# Patient Record
Sex: MALE | Race: WHITE | NOT HISPANIC OR LATINO | Employment: OTHER | URBAN - METROPOLITAN AREA
[De-identification: names, ages, dates, MRNs, and addresses within clinical notes are randomized per-mention and may not be internally consistent; named-entity substitution may affect disease eponyms.]

---

## 2017-01-03 ENCOUNTER — TELEPHONE (OUTPATIENT)
Dept: PODIATRY | Facility: CLINIC | Age: 82
End: 2017-01-03

## 2017-01-03 NOTE — TELEPHONE ENCOUNTER
Message sent to patient via patient portal regarding fungus culture results. I advised the patient that they have been reviewed by Dr. Duenas forwarded to [email or web address] for compound recommendation.

## 2017-01-03 NOTE — TELEPHONE ENCOUNTER
----- Message from Bud Daly sent at 1/3/2017  3:34 PM CST -----  Contact: david rahman [facility]   States she is rtn nurses call and can be reached at [phone] press option 4//thanks/dbw

## 2017-01-03 NOTE — TELEPHONE ENCOUNTER
----- Message from Rodney Goncalves sent at 1/3/2017  3:05 PM CST -----  Contact: celine hernandez/ [facility]  She's calling in regards to the culture for the pt, please advise, ph# [phone]

## 2017-01-03 NOTE — TELEPHONE ENCOUNTER
I returned the call to Gemma. She stated that she was calling to advise that she had received my fax from earlier requesting a compound recommendation for the patient's nail fungus and that the patient had already received the nail suspension that they would have recommended for him in early December. She then stated that she just wanted to make sure that the patient did not also need a compounded cream as well. I advised her that per Dr. Duenas's last note, the patient did not have a wound and was only being treated for the nail fungus. I then advised her that I would let Dr. Duenas know and give her a call back if the patient needed anything further. She verbalized understanding and stated that she would go ahead and call the patient and let him know. I advised her that that would be fine. Call ended well.

## 2017-01-03 NOTE — TELEPHONE ENCOUNTER
----- Message from Claribel Duenas DPM sent at 12/30/2016  1:56 PM CST -----  Call and tell pt that cultures results have been reviewed and will order compound. Please forward culture results to [facility] for compounding.

## 2017-01-03 NOTE — TELEPHONE ENCOUNTER
I returned the call to Gemma. She was unavailable, and so I left a message for her to return my call to [phone].

## 2017-01-06 ENCOUNTER — PATIENT MESSAGE (OUTPATIENT)
Dept: PODIATRY | Facility: CLINIC | Age: 82
End: 2017-01-06

## 2017-01-28 DIAGNOSIS — M19.90 OSTEOARTHRITIS, UNSPECIFIED OSTEOARTHRITIS TYPE, UNSPECIFIED SITE: ICD-10-CM

## 2017-01-30 RX ORDER — TRAMADOL HYDROCHLORIDE 50 MG/1
TABLET ORAL
Qty: 30 TABLET | Refills: 0 | Status: SHIPPED | OUTPATIENT
Start: 2017-01-30 | End: 2017-03-04 | Stop reason: SDUPTHER

## 2017-02-01 ENCOUNTER — TELEPHONE (OUTPATIENT)
Dept: INTERNAL MEDICINE | Facility: CLINIC | Age: 82
End: 2017-02-01

## 2017-02-01 NOTE — TELEPHONE ENCOUNTER
Returned pt daughter phone call, she states that she would like something called in for her dad. He has been not sleeping well , has mood changes and not eating. He has an apt on tomorrow for 8:40am to see you. She also wants you to know that her dad has fallen 2wks ago and injured himself. She would like to get assistance with home davonte for her dad.

## 2017-03-04 DIAGNOSIS — M19.90 OSTEOARTHRITIS, UNSPECIFIED OSTEOARTHRITIS TYPE, UNSPECIFIED SITE: ICD-10-CM

## 2017-03-06 RX ORDER — TRAMADOL HYDROCHLORIDE 50 MG/1
TABLET ORAL
Qty: 30 TABLET | Refills: 0 | Status: SHIPPED | OUTPATIENT
Start: 2017-03-06 | End: 2017-10-23

## 2017-04-12 ENCOUNTER — TELEPHONE (OUTPATIENT)
Dept: INTERNAL MEDICINE | Facility: CLINIC | Age: 82
End: 2017-04-12

## 2017-04-12 ENCOUNTER — OFFICE VISIT (OUTPATIENT)
Dept: URGENT CARE | Facility: CLINIC | Age: 82
End: 2017-04-12
Payer: MEDICARE

## 2017-04-12 VITALS
HEIGHT: 64 IN | WEIGHT: 138 LBS | OXYGEN SATURATION: 94 % | TEMPERATURE: 98 F | DIASTOLIC BLOOD PRESSURE: 70 MMHG | HEART RATE: 59 BPM | SYSTOLIC BLOOD PRESSURE: 120 MMHG | BODY MASS INDEX: 23.56 KG/M2

## 2017-04-12 DIAGNOSIS — R09.A2 GLOBUS SENSATION: Primary | ICD-10-CM

## 2017-04-12 PROCEDURE — 99213 OFFICE O/P EST LOW 20 MIN: CPT | Mod: PBBFAC,PO | Performed by: NURSE PRACTITIONER

## 2017-04-12 PROCEDURE — 99999 PR PBB SHADOW E&M-EST. PATIENT-LVL III: CPT | Mod: PBBFAC,,, | Performed by: NURSE PRACTITIONER

## 2017-04-12 PROCEDURE — 99214 OFFICE O/P EST MOD 30 MIN: CPT | Mod: S$PBB,,, | Performed by: NURSE PRACTITIONER

## 2017-04-12 RX ORDER — PREDNISONE 10 MG/1
10 TABLET ORAL DAILY
Qty: 3 TABLET | Refills: 0 | Status: SHIPPED | OUTPATIENT
Start: 2017-04-12 | End: 2017-04-15

## 2017-04-12 NOTE — MR AVS SNAPSHOT
[facility]  [address] [place] [address]  Phone: [phone]  Fax: [phone]                  Coy Paredeskaylee   2017 5:40 PM   Office Visit    Description:  Male : 1924   Provider:  Adwoa Truong NP   Department:  [facility]           Reason for Visit     Sore Throat           Diagnoses this Visit        Comments    Globus sensation    -  Primary            To Do List           Future Appointments        Provider Department Dept Phone    2017 8:30 AM Roscoe Boyd MD [facility] [phone]      Goals (5 Years of Data)     None      Follow-Up and Disposition     Return if symptoms worsen or fail to improve.       These Medications        Disp Refills Start End    predniSONE (DELTASONE) 10 MG tablet 3 tablet 0 2017 4/15/2017    Take 1 tablet (10 mg total) by mouth once daily. - Oral    Pharmacy: [facility]/pharmacy #[id number] - [place], [place] - [address] [address] [facility] [address] Ph #: [phone]         [facility]     [facility] Nurse Care Line -  Assistance  Unless otherwise directed by your provider, please contact [facility], our nurse care line that is available for  assistance.     Registered nurses in the [facility] Center provide: appointment scheduling, clinical advisement, health education, and other advisory services.  Call: [phone] (toll free)               Medications           Message regarding Medications     Verify the changes and/or additions to your medication regime listed below are the same as discussed with your clinician today.  If any of these changes or additions are incorrect, please notify your healthcare provider.        START taking these NEW medications        Refills    predniSONE (DELTASONE) 10 MG tablet 0    Sig: Take 1 tablet (10 mg total) by mouth once daily.    Class: Normal    Route: Oral           Verify that the below list of medications is an  "accurate representation of the medications you are currently taking.  If none reported, the list may be blank. If incorrect, please contact your healthcare provider. Carry this list with you in case of emergency.           Current Medications     aspirin (ECOTRIN) 81 MG EC tablet Take 81 mg by mouth every other day.     atorvastatin (LIPITOR) 20 MG tablet Take 1 tablet (20 mg total) by mouth once daily.    benazepril-hydrochlorthiazide (LOTENSIN HCT) 20-12.5 mg per tablet Take 1 tablet by mouth once daily.    cyanocobalamin (VITAMIN B-12) 1000 MCG tablet Take 100 mcg by mouth once daily.    docusate sodium (COLACE) 100 MG capsule Take 1 capsule (100 mg total) by mouth 2 (two) times daily.    multivitamin capsule Take 1 capsule by mouth once daily.    predniSONE (DELTASONE) 10 MG tablet Take 1 tablet (10 mg total) by mouth once daily.    tramadol (ULTRAM) 50 mg tablet TAKE 1 TABLET BY MOUTH AT BEDTIME           Clinical Reference Information           Your Vitals Were     BP Pulse Temp Height    120/70 (BP Location: Right arm, Patient Position: Sitting, BP Method: Manual) 59 98.2 °F (36.8 °C) (Tympanic) 5' 4" (1.626 m)    Weight SpO2 BMI    62.6 kg (138 lb 0.1 oz) 94% 23.69 kg/m2      Blood Pressure          Most Recent Value    BP  120/70      Allergies as of 4/12/2017     Baycol [Cerivastatin]    Codeine    Norvasc [Amlodipine]    Sular [Nisoldipine]      Immunizations Administered on Date of Encounter - 4/12/2017     None      Language Assistance Services     ATTENTION: Language assistance services are available, free of charge. Please call [phone].      ATENCIÓN: Si habla español, tiene a gifford disposición servicios gratuitos de asistencia lingüística. Llame al [phone].     CHÚ Ý: N?u b?n nói Ti?ng Vi?t, có các d?ch v? h? tr? ngôn ng? mi?n phí dành cho b?n. G?i s? [phone].         [facility] complies with applicable Federal civil rights laws and does not discriminate on the basis of " race, color, national origin, age, disability, or sex.

## 2017-04-12 NOTE — TELEPHONE ENCOUNTER
Pt daughter called in stating  That her dad Couldn't sleep last night felt like he couldn't breath. Pt daughter states that he sat up and chewed ice and it helped a little bit.  She states that it comes and goes. she will take him to urgent care at [facility]      Please Advise

## 2017-04-12 NOTE — TELEPHONE ENCOUNTER
----- Message from Carla Harris sent at 4/12/2017  4:25 PM CDT -----  Contact: pt dght  Call pt gayle Josue at [phone]//regarding that he had difficult sleeping last nite he sit up feel like the top of throat felt like it was closing//jose ht

## 2017-04-13 NOTE — PROGRESS NOTES
Subjective:       Patient ID: Coy López is a 89 y.o. male.    Chief Complaint: Sore Throat (pt feels like his throat is closing up)    HPI Comments: 89 yo male presents to Urgent Care with reports of sensation of something in his throat last night. He states it resolved after chewing ice. Denies sensation in office today. Denies any new foods of medication. Denies any associated symptoms at present.    Sore Throat    This is a new problem. The current episode started yesterday. The problem has been resolved. Neither side of throat is experiencing more pain than the other. There has been no fever. The patient is experiencing no pain. Pertinent negatives include no abdominal pain, congestion, coughing, diarrhea, drooling, ear discharge, ear pain, headaches, hoarse voice, plugged ear sensation, neck pain, shortness of breath, swollen glands, trouble swallowing or vomiting. He has had no exposure to strep or mono. He has tried cool liquids for the symptoms. The treatment provided moderate relief.     Review of Systems   Constitutional: Negative for activity change and fatigue.   HENT: Positive for sore throat. Negative for congestion, drooling, ear discharge, ear pain, hoarse voice, rhinorrhea, sinus pressure and trouble swallowing.         Throat felt swollen last night   Eyes: Negative for pain, discharge and visual disturbance.   Respiratory: Negative for cough, choking, shortness of breath and wheezing.    Cardiovascular: Negative for chest pain, palpitations and leg swelling.   Gastrointestinal: Negative for abdominal pain, constipation, diarrhea, nausea and vomiting.   Endocrine: Negative for cold intolerance.   Genitourinary: Negative for difficulty urinating, dysuria, flank pain, frequency and genital sores.   Musculoskeletal: Negative for arthralgias, back pain, gait problem and neck pain.   Skin: Negative for rash and wound.   Allergic/Immunologic: Negative for environmental allergies and food  allergies.   Neurological: Negative for dizziness, light-headedness, numbness and headaches.   Hematological: Negative for adenopathy.   Psychiatric/Behavioral: Negative for behavioral problems.   All other systems reviewed and are negative.      Objective:      Physical Exam   Constitutional: He is oriented to person, place, and time. He appears well-developed and well-nourished.   HENT:   Head: Normocephalic.   Mouth/Throat: Uvula is midline, oropharynx is clear and moist and mucous membranes are normal. No tonsillar exudate.   Denture removed in office with no signs of abnormalities.   Cardiovascular: Normal rate and normal heart sounds.    Pulmonary/Chest: Effort normal and breath sounds normal.   Neurological: He is alert and oriented to person, place, and time.   Skin: Skin is warm and dry.   Psychiatric: He has a normal mood and affect.   Nursing note and vitals reviewed.      Assessment:       1. Globus sensation        Plan:         Coy was seen today for sore throat.    Diagnoses and all orders for this visit:    Globus sensation  -     predniSONE (DELTASONE) 10 MG tablet; Take 1 tablet (10 mg total) by mouth once daily.    Discussed with patient and daughter home care and treatment. Discussed mediations currently on may need evaluation from PCP. Advised if symptoms return tonight to report to ER. Patient and Daughter agreed. Patient discharged in stable condition with AVS in hand.

## 2017-04-20 ENCOUNTER — OFFICE VISIT (OUTPATIENT)
Dept: INTERNAL MEDICINE | Facility: CLINIC | Age: 82
End: 2017-04-20
Payer: MEDICARE

## 2017-04-20 VITALS
BODY MASS INDEX: 23.54 KG/M2 | TEMPERATURE: 97 F | DIASTOLIC BLOOD PRESSURE: 65 MMHG | HEIGHT: 64 IN | OXYGEN SATURATION: 98 % | SYSTOLIC BLOOD PRESSURE: 129 MMHG | HEART RATE: 62 BPM | WEIGHT: 137.88 LBS

## 2017-04-20 DIAGNOSIS — K59.09 CONSTIPATION, CHRONIC: ICD-10-CM

## 2017-04-20 DIAGNOSIS — N18.30 CHRONIC KIDNEY DISEASE, STAGE III (MODERATE): ICD-10-CM

## 2017-04-20 DIAGNOSIS — E78.5 HYPERLIPIDEMIA, UNSPECIFIED HYPERLIPIDEMIA TYPE: ICD-10-CM

## 2017-04-20 DIAGNOSIS — I10 ESSENTIAL HYPERTENSION: Primary | ICD-10-CM

## 2017-04-20 DIAGNOSIS — C61 PROSTATE CA: ICD-10-CM

## 2017-04-20 DIAGNOSIS — C67.9 MALIGNANT NEOPLASM OF URINARY BLADDER, UNSPECIFIED SITE: ICD-10-CM

## 2017-04-20 DIAGNOSIS — G45.9 TRANSIENT CEREBRAL ISCHEMIA, UNSPECIFIED TYPE: ICD-10-CM

## 2017-04-20 LAB
BASOPHILS # BLD AUTO: 0.03 K/UL
BASOPHILS NFR BLD: 0.5 %
BILIRUB UR QL STRIP: NEGATIVE
CLARITY UR REFRACT.AUTO: CLEAR
COLOR UR AUTO: YELLOW
DIFFERENTIAL METHOD: ABNORMAL
EOSINOPHIL # BLD AUTO: 0.3 K/UL
EOSINOPHIL NFR BLD: 4.1 %
ERYTHROCYTE [DISTWIDTH] IN BLOOD BY AUTOMATED COUNT: 12.8 %
GLUCOSE UR QL STRIP: NEGATIVE
HCT VFR BLD AUTO: 39.3 %
HGB BLD-MCNC: 13.2 G/DL
HGB UR QL STRIP: NEGATIVE
KETONES UR QL STRIP: NEGATIVE
LEUKOCYTE ESTERASE UR QL STRIP: NEGATIVE
LYMPHOCYTES # BLD AUTO: 1.8 K/UL
LYMPHOCYTES NFR BLD: 27.2 %
MCH RBC QN AUTO: 30.5 PG
MCHC RBC AUTO-ENTMCNC: 33.6 %
MCV RBC AUTO: 91 FL
MONOCYTES # BLD AUTO: 0.6 K/UL
MONOCYTES NFR BLD: 9.3 %
NEUTROPHILS # BLD AUTO: 3.8 K/UL
NEUTROPHILS NFR BLD: 58.7 %
NITRITE UR QL STRIP: NEGATIVE
PH UR STRIP: 6 [PH] (ref 5–8)
PLATELET # BLD AUTO: 244 K/UL
PMV BLD AUTO: 10.6 FL
PROT UR QL STRIP: NEGATIVE
RBC # BLD AUTO: 4.33 M/UL
SP GR UR STRIP: 1.02 (ref 1–1.03)
URN SPEC COLLECT METH UR: NORMAL
UROBILINOGEN UR STRIP-ACNC: NEGATIVE EU/DL
WBC # BLD AUTO: 6.54 K/UL

## 2017-04-20 PROCEDURE — 99213 OFFICE O/P EST LOW 20 MIN: CPT | Mod: PBBFAC,PO | Performed by: FAMILY MEDICINE

## 2017-04-20 PROCEDURE — 81003 URINALYSIS AUTO W/O SCOPE: CPT

## 2017-04-20 PROCEDURE — 80053 COMPREHEN METABOLIC PANEL: CPT

## 2017-04-20 PROCEDURE — 85025 COMPLETE CBC W/AUTO DIFF WBC: CPT

## 2017-04-20 PROCEDURE — 99214 OFFICE O/P EST MOD 30 MIN: CPT | Mod: S$PBB,,, | Performed by: FAMILY MEDICINE

## 2017-04-20 PROCEDURE — 99999 PR PBB SHADOW E&M-EST. PATIENT-LVL III: CPT | Mod: PBBFAC,,, | Performed by: FAMILY MEDICINE

## 2017-04-20 NOTE — MR AVS SNAPSHOT
[address]  [place] [address]  Phone: [phone]  Fax: [phone]                  Coy López   2017 8:30 AM   Office Visit    Description:  Male : 1924   Provider:  Roscoe Boyd MD   Department:  [facility]           Reason for Visit     Follow-up           Diagnoses this Visit        Comments    Transient cerebral ischemia, unspecified type    -  Primary     Essential hypertension         Malignant neoplasm of urinary bladder, unspecified site         Constipation, chronic                To Do List           Future Appointments        Provider Department Dept Phone    2017 8:30 AM Roscoe Boyd MD [facility] [phone]      Goals (5 Years of Data)     None      Follow-Up and Disposition     Return in about 4 months (around 2017).      [facility]     [facility] On Call Nurse Care Line -  Assistance  Unless otherwise directed by your provider, please contact [facility], our nurse care line that is available for  assistance.     Registered nurses in the [facility] On Call Center provide: appointment scheduling, clinical advisement, health education, and other advisory services.  Call: [phone] (toll free)               Medications           Message regarding Medications     Verify the changes and/or additions to your medication regime listed below are the same as discussed with your clinician today.  If any of these changes or additions are incorrect, please notify your healthcare provider.        STOP taking these medications     multivitamin capsule Take 1 capsule by mouth once daily.           Verify that the below list of medications is an accurate representation of the medications you are currently taking.  If none reported, the list may be blank. If incorrect, please contact your healthcare provider. Carry this list with you in case of emergency.           Current Medications      "aspirin (ECOTRIN) 81 MG EC tablet Take 81 mg by mouth every other day.     atorvastatin (LIPITOR) 20 MG tablet Take 1 tablet (20 mg total) by mouth once daily.    benazepril-hydrochlorthiazide (LOTENSIN HCT) 20-12.5 mg per tablet Take 1 tablet by mouth once daily.    cyanocobalamin (VITAMIN B-12) 1000 MCG tablet Take 100 mcg by mouth once daily.    docusate sodium (COLACE) 100 MG capsule Take 1 capsule (100 mg total) by mouth 2 (two) times daily.    tramadol (ULTRAM) 50 mg tablet TAKE 1 TABLET BY MOUTH AT BEDTIME           Clinical Reference Information           Your Vitals Were     BP Pulse Temp Height Weight SpO2    129/65 62 97.4 °F (36.3 °C) (Oral) 5' 4" (1.626 m) 62.5 kg (137 lb 14.4 oz) 98%    BMI                23.67 kg/m2          Blood Pressure          Most Recent Value    BP  129/65      Allergies as of 4/20/2017     Baycol [Cerivastatin]    Codeine    Norvasc [Amlodipine]    Sular [Nisoldipine]      Immunizations Administered on Date of Encounter - 4/20/2017     None      Orders Placed During Today's Visit      Normal Orders This Visit    CBC auto differential     Comprehensive metabolic panel     Urinalysis       Language Assistance Services     ATTENTION: Language assistance services are available, free of charge. Please call [phone].      ATENCIÓN: Si habla español, tiene a gifford disposición servicios gratuitos de asistencia lingüística. Llame al [phone].     REINIER Ý: N?u b?n nói Ti?ng Vi?t, có các d?ch v? h? tr? ngôn ng? mi?n phí dành cho b?n. G?i s? [phone].         [facility] complies with applicable Federal civil rights laws and does not discriminate on the basis of race, color, national origin, age, disability, or sex.        "

## 2017-04-20 NOTE — PROGRESS NOTES
Subjective:       Patient ID: Coy López is a 89 y.o. male.    Chief Complaint: Follow-up    HPI Comments: Follow-up hypertension, hyperlipidemia, chronic kidney disease stage III, TIA, chronic constipation.  He has a history of prostate cancer and bladder cancer times years ago.  He reports he no longer is being followed by urology.  He has no urinary tract symptoms.  He has no hematuria.  He denies any headaches, chest pain, palpitations, shortness of breath.  He is avoiding NSAIDs due to chronic kidney disease.  Chronic constipation is controlled with MiraLAX.    Review of Systems   Constitutional: Negative for appetite change, chills, fatigue, fever and unexpected weight change.   HENT: Negative for congestion and postnasal drip.         He had a recent episode where he felt like his throat was closing.  Urgent care evaluation was done.  He reports this has resolved.  Denies dysphagia esophageal reflux   Respiratory: Negative for cough, chest tightness, shortness of breath and wheezing.    Cardiovascular: Negative for chest pain, palpitations and leg swelling.        Denies lightheadedness or syncope   Gastrointestinal: Positive for constipation. Negative for abdominal distention, abdominal pain, blood in stool, diarrhea, nausea and vomiting.   Genitourinary: Negative for difficulty urinating, dysuria, frequency, hematuria and urgency.   Musculoskeletal: Positive for back pain.   Neurological: Negative for headaches.       Objective:      Physical Exam   Constitutional: He is oriented to person, place, and time. He appears well-developed and well-nourished.   HENT:   Right Ear: External ear normal.   Left Ear: External ear normal.   Nose: Nose normal.   Mouth/Throat: Oropharynx is clear and moist.   Eyes: Conjunctivae are normal.   Neck: Neck supple. No JVD present. No thyromegaly present.   Cardiovascular: Normal rate, regular rhythm and normal heart sounds.    No murmur heard.  Pulmonary/Chest:  Effort normal and breath sounds normal. No respiratory distress. He has no wheezes. He has no rales.   Abdominal: Soft. Bowel sounds are normal. He exhibits no mass. There is no tenderness.   Lymphadenopathy:     He has no cervical adenopathy.   Neurological: He is alert and oriented to person, place, and time. No cranial nerve deficit. He exhibits normal muscle tone. Coordination normal.       Office Visit on 12/01/2016   Component Date Value Ref Range Status    Fungus (Mycology) Culture 12/01/2016 TRICHOPHYTON RUBRUM   Final    KOH Prep 12/01/2016 Few Budding yeast   Final     Assessment:       1. Transient cerebral ischemia, unspecified type    2. Essential hypertension    3. Malignant neoplasm of urinary bladder, unspecified site    4. Constipation, chronic        Plan:   1. Essential hypertension  Blood pressure control 129/65 continue current medications  - CBC auto differential  - Comprehensive metabolic panel    2. Transient cerebral ischemia, unspecified type  He continues on low-dose aspirin.  He denies any current neurological deficits    3. Malignant neoplasm of urinary bladder, unspecified site    Asymptomatic  - Urinalysis    4. Constipation, chronic    Controller MiraLAX    5. Chronic kidney disease, stage III (moderate)     Continue avoiding NSAIDs and recheck lab    6. Prostate CA    No current symptoms  Follow-up in 4 months    7. Hyperlipidemia, unspecified hyperlipidemia type  Lipitor controlled with   medication        Transient cerebral ischemia, unspecified type    Essential hypertension  -     CBC auto differential  -     Comprehensive metabolic panel    Malignant neoplasm of urinary bladder, unspecified site  -     Urinalysis    Constipation, chronic

## 2017-04-21 LAB
ALBUMIN SERPL BCP-MCNC: 3.8 G/DL
ALP SERPL-CCNC: 64 U/L
ALT SERPL W/O P-5'-P-CCNC: 21 U/L
ANION GAP SERPL CALC-SCNC: 8 MMOL/L
AST SERPL-CCNC: 26 U/L
BILIRUB SERPL-MCNC: 0.7 MG/DL
BUN SERPL-MCNC: 24 MG/DL
CALCIUM SERPL-MCNC: 9.3 MG/DL
CHLORIDE SERPL-SCNC: 105 MMOL/L
CO2 SERPL-SCNC: 29 MMOL/L
CREAT SERPL-MCNC: 1.2 MG/DL
EST. GFR  (AFRICAN AMERICAN): >60 ML/MIN/1.73 M^2
EST. GFR  (NON AFRICAN AMERICAN): 52.2 ML/MIN/1.73 M^2
GLUCOSE SERPL-MCNC: 124 MG/DL
POTASSIUM SERPL-SCNC: 3.9 MMOL/L
PROT SERPL-MCNC: 7.3 G/DL
SODIUM SERPL-SCNC: 142 MMOL/L

## 2017-04-22 ENCOUNTER — PATIENT MESSAGE (OUTPATIENT)
Dept: INTERNAL MEDICINE | Facility: CLINIC | Age: 82
End: 2017-04-22

## 2017-05-01 RX ORDER — ATORVASTATIN CALCIUM 20 MG/1
TABLET, FILM COATED ORAL
Qty: 90 TABLET | Refills: 3 | Status: SHIPPED | OUTPATIENT
Start: 2017-05-01 | End: 2017-07-24

## 2017-05-22 ENCOUNTER — PATIENT MESSAGE (OUTPATIENT)
Dept: INTERNAL MEDICINE | Facility: CLINIC | Age: 82
End: 2017-05-22

## 2017-05-22 RX ORDER — BENAZEPRIL HYDROCHLORIDE AND HYDROCHLOROTHIAZIDE 20; 12.5 MG/1; MG/1
1 TABLET ORAL DAILY
Qty: 30 TABLET | Refills: 5 | Status: SHIPPED | OUTPATIENT
Start: 2017-05-22 | End: 2018-01-24 | Stop reason: SDUPTHER

## 2017-05-22 NOTE — TELEPHONE ENCOUNTER
----- Message from Alvarado Ricci sent at 5/22/2017 12:18 PM CDT -----  Contact: [facility] Dqxhoqhx-[phone]   Would like a 90 day refill on Rx medication Benazepril/HCTZ 20-12.5 mg.  Please call back @ [phone]. Thanks-[facility]

## 2017-07-14 ENCOUNTER — TELEPHONE (OUTPATIENT)
Dept: INTERNAL MEDICINE | Facility: CLINIC | Age: 82
End: 2017-07-14

## 2017-07-14 ENCOUNTER — NURSE TRIAGE (OUTPATIENT)
Dept: ADMINISTRATIVE | Facility: CLINIC | Age: 82
End: 2017-07-14

## 2017-07-14 NOTE — TELEPHONE ENCOUNTER
Spoke with daughter @ [phone], states he is at [facility] and is being monitored right now, trying to get BP down and doing labs. Informed to get copies so we can put in his records and keep us informed of what is going on. Verbalized understanding.

## 2017-07-14 NOTE — TELEPHONE ENCOUNTER
----- Message from Stefania Mayorga sent at 7/14/2017  2:29 PM CDT -----  Contact: jonas/daughter [phone]  States that she is returning call please call back at [phone]//thank you acc

## 2017-07-14 NOTE — TELEPHONE ENCOUNTER
----- Message from Alvarado Ricci sent at 7/14/2017  1:08 PM CDT -----  Contact: [facility][phone]  Would like to consult with the nurse about Elevated BP and ER visit, Would like to know if patient can be worked in today if possible instead of Monday.  Please call back at [phone].  Thanks-[facility]

## 2017-07-14 NOTE — TELEPHONE ENCOUNTER
"  Reason for Disposition   [1] Weakness of the face, arm or leg on one side of the body AND [2] new onset    Answer Assessment - Initial Assessment Questions  1. BLOOD PRESSURE: "What is the blood pressure?" "Did you take at least two measurements 5 minutes apart?"      222/93  2. ONSET: "When did you take your blood pressure?"      Just took  3. HOW: "How did you obtain the blood pressure?" (e.g., visiting nurse, automatic home BP monitor)      Home cuff  4. HISTORY: "Do you have a history of high blood pressure?"      yes  5. MEDICATIONS: "Are you taking any medications for blood pressure?" "Have you missed any doses recently?"      denies  6. OTHER SYMPTOMS: "Do you have any symptoms?" (e.g., headache, chest pain, blurred vision, difficulty breathing, weakness)      Blurred vision weakness present  7. PREGNANCY: "Is there any chance you are pregnant?" "When was your last menstrual period?"      Not applicable    Protocols used: ST HIGH BLOOD PRESSURE-A-AH    "

## 2017-07-14 NOTE — TELEPHONE ENCOUNTER
States that he was given Klonopin to lower blood pressure, he will follow up on Monday. Advised to check  BP before and after meds. Verbalized understanding.

## 2017-07-17 ENCOUNTER — OFFICE VISIT (OUTPATIENT)
Dept: INTERNAL MEDICINE | Facility: CLINIC | Age: 82
End: 2017-07-17
Payer: MEDICARE

## 2017-07-17 VITALS
BODY MASS INDEX: 23.37 KG/M2 | OXYGEN SATURATION: 96 % | HEIGHT: 64 IN | HEART RATE: 54 BPM | TEMPERATURE: 98 F | DIASTOLIC BLOOD PRESSURE: 60 MMHG | SYSTOLIC BLOOD PRESSURE: 118 MMHG | WEIGHT: 136.88 LBS

## 2017-07-17 DIAGNOSIS — I10 ESSENTIAL HYPERTENSION: Primary | ICD-10-CM

## 2017-07-17 DIAGNOSIS — G45.9 TRANSIENT CEREBRAL ISCHEMIA, UNSPECIFIED TYPE: ICD-10-CM

## 2017-07-17 DIAGNOSIS — R03.0 ELEVATED BLOOD PRESSURE READING: ICD-10-CM

## 2017-07-17 PROBLEM — N18.30 CHRONIC KIDNEY DISEASE, STAGE III (MODERATE): Status: RESOLVED | Noted: 2017-04-20 | Resolved: 2017-07-17

## 2017-07-17 PROCEDURE — 99999 PR PBB SHADOW E&M-EST. PATIENT-LVL IV: CPT | Mod: PBBFAC,,, | Performed by: FAMILY MEDICINE

## 2017-07-17 PROCEDURE — 1126F AMNT PAIN NOTED NONE PRSNT: CPT | Mod: ,,, | Performed by: FAMILY MEDICINE

## 2017-07-17 PROCEDURE — 99214 OFFICE O/P EST MOD 30 MIN: CPT | Mod: PBBFAC,PO | Performed by: FAMILY MEDICINE

## 2017-07-17 PROCEDURE — 99214 OFFICE O/P EST MOD 30 MIN: CPT | Mod: S$PBB,,, | Performed by: FAMILY MEDICINE

## 2017-07-17 PROCEDURE — 1159F MED LIST DOCD IN RCRD: CPT | Mod: ,,, | Performed by: FAMILY MEDICINE

## 2017-07-17 RX ORDER — MULTIVITAMIN
1 TABLET ORAL DAILY
COMMUNITY
End: 2019-06-17

## 2017-07-17 NOTE — PROGRESS NOTES
Subjective:       Patient ID: Coy López is a 89 y.o. male.    Chief Complaint: Follow-up    Emergency room follow-up for hypertension.  He was in emergency room several days ago.  Blood pressure was 222/93.  He said he didn't feel well.  He denies any headache chest pain palpitations or weakness.  Denies any shortness of breath or edema.  Denies any visual changes or one-sided weakness.  Blood pressures at home have been no 170-190 systolic range.  He was given clonidine 0.1 mg emergency room with improvement of blood pressure.  Lab EKG chest x-ray was done in emergency room.  CBC urine CMP was done.  Lab was essentially negative with a GFR normal 72.  He currently is on lisinopril HCTZ.  He has a past history of TIAs and is not currently taking aspirin.      Review of Systems   Constitutional: Negative for appetite change, fatigue and unexpected weight change.   HENT: Negative for congestion.    Eyes: Negative for visual disturbance.   Respiratory: Negative for cough, chest tightness, shortness of breath and wheezing.    Cardiovascular: Negative for chest pain, palpitations and leg swelling.   Gastrointestinal: Negative for abdominal pain.   Genitourinary: Negative for difficulty urinating.   Neurological: Negative for syncope, facial asymmetry, speech difficulty, light-headedness, numbness and headaches.       Objective:      Physical Exam   Constitutional: He is oriented to person, place, and time. He appears well-developed and well-nourished. No distress.   Neck: Neck supple. No JVD present. No thyromegaly present.   Cardiovascular: Normal rate, regular rhythm and normal heart sounds.    No murmur heard.  Pulmonary/Chest: Effort normal and breath sounds normal. No respiratory distress. He has no wheezes.   Abdominal: Soft. Bowel sounds are normal. He exhibits no mass. There is no tenderness.   Lymphadenopathy:     He has no cervical adenopathy.   Neurological: He is alert and oriented to person,  place, and time. He has normal reflexes. He exhibits normal muscle tone. Coordination normal.   Skin: He is not diaphoretic.       Office Visit on 04/20/2017   Component Date Value Ref Range Status    WBC 04/20/2017 6.54  3.90 - 12.70 K/uL Final    RBC 04/20/2017 4.33* 4.60 - 6.20 M/uL Final    Hemoglobin 04/20/2017 13.2* 14.0 - 18.0 g/dL Final    Hematocrit 04/20/2017 39.3* 40.0 - 54.0 % Final    MCV 04/20/2017 91  82 - 98 fL Final    MCH 04/20/2017 30.5  27.0 - 31.0 pg Final    MCHC 04/20/2017 33.6  32.0 - 36.0 % Final    RDW 04/20/2017 12.8  11.5 - 14.5 % Final    Platelets 04/20/2017 244  150 - 350 K/uL Final    MPV 04/20/2017 10.6  9.2 - 12.9 fL Final    Gran # 04/20/2017 3.8  1.8 - 7.7 K/uL Final    Lymph # 04/20/2017 1.8  1.0 - 4.8 K/uL Final    Mono # 04/20/2017 0.6  0.3 - 1.0 K/uL Final    Eos # 04/20/2017 0.3  0.0 - 0.5 K/uL Final    Baso # 04/20/2017 0.03  0.00 - 0.20 K/uL Final    Gran% 04/20/2017 58.7  38.0 - 73.0 % Final    Lymph% 04/20/2017 27.2  18.0 - 48.0 % Final    Mono% 04/20/2017 9.3  4.0 - 15.0 % Final    Eosinophil% 04/20/2017 4.1  0.0 - 8.0 % Final    Basophil% 04/20/2017 0.5  0.0 - 1.9 % Final    Differential Method 04/20/2017 Automated   Final    Sodium 04/21/2017 142  136 - 145 mmol/L Final    Potassium 04/21/2017 3.9  3.5 - 5.1 mmol/L Final    Chloride 04/21/2017 105  95 - 110 mmol/L Final    CO2 04/21/2017 29  23 - 29 mmol/L Final    Glucose 04/21/2017 124* 70 - 110 mg/dL Final    BUN, Bld 04/21/2017 24  10 - 30 mg/dL Final    Creatinine 04/21/2017 1.2  0.5 - 1.4 mg/dL Final    Calcium 04/21/2017 9.3  8.7 - 10.5 mg/dL Final    Total Protein 04/21/2017 7.3  6.0 - 8.4 g/dL Final    Albumin 04/21/2017 3.8  3.5 - 5.2 g/dL Final    Total Bilirubin 04/21/2017 0.7  0.1 - 1.0 mg/dL Final    Alkaline Phosphatase 04/21/2017 64  55 - 135 U/L Final    AST 04/21/2017 26  10 - 40 U/L Final    ALT 04/21/2017 21  10 - 44 U/L Final    Anion Gap 04/21/2017 8  8 - 16  mmol/L Final    eGFR if African American 04/21/2017 >60.0  >60 mL/min/1.73 m^2 Final    eGFR if non African American 04/21/2017 52.2* >60 mL/min/1.73 m^2 Final    Specimen UA 04/20/2017 Urine, Clean Catch   Final    Color, UA 04/20/2017 Yellow  Yellow, Straw, Latoya Final    Appearance, UA 04/20/2017 Clear  Clear Final    pH, UA 04/20/2017 6.0  5.0 - 8.0 Final    Specific Gravity, UA 04/20/2017 1.020  1.005 - 1.030 Final    Protein, UA 04/20/2017 Negative  Negative Final    Glucose, UA 04/20/2017 Negative  Negative Final    Ketones, UA 04/20/2017 Negative  Negative Final    Bilirubin (UA) 04/20/2017 Negative  Negative Final    Occult Blood UA 04/20/2017 Negative  Negative Final    Nitrite, UA 04/20/2017 Negative  Negative Final    Urobilinogen, UA 04/20/2017 Negative  <2.0 EU/dL Final    Leukocytes, UA 04/20/2017 Negative  Negative Final     Assessment:       No diagnosis found.    Plan:   Blood pressure by me lying down was 118/60.  Left arm.  Recommend checking blood pressure twice a day and return with blood pressure monitor in one week.  Resume aspirin 81 mg a day.      There are no diagnoses linked to this encounter.

## 2017-07-24 ENCOUNTER — OFFICE VISIT (OUTPATIENT)
Dept: INTERNAL MEDICINE | Facility: CLINIC | Age: 82
End: 2017-07-24
Payer: MEDICARE

## 2017-07-24 VITALS
BODY MASS INDEX: 23.43 KG/M2 | WEIGHT: 137.25 LBS | SYSTOLIC BLOOD PRESSURE: 130 MMHG | OXYGEN SATURATION: 97 % | HEIGHT: 64 IN | TEMPERATURE: 98 F | HEART RATE: 62 BPM | DIASTOLIC BLOOD PRESSURE: 62 MMHG

## 2017-07-24 DIAGNOSIS — G89.29 CHRONIC BILATERAL LOW BACK PAIN WITH LEFT-SIDED SCIATICA: ICD-10-CM

## 2017-07-24 DIAGNOSIS — I10 ESSENTIAL HYPERTENSION: Primary | ICD-10-CM

## 2017-07-24 DIAGNOSIS — E78.5 HYPERLIPIDEMIA, UNSPECIFIED HYPERLIPIDEMIA TYPE: ICD-10-CM

## 2017-07-24 DIAGNOSIS — M54.42 CHRONIC BILATERAL LOW BACK PAIN WITH LEFT-SIDED SCIATICA: ICD-10-CM

## 2017-07-24 PROCEDURE — 99999 PR PBB SHADOW E&M-EST. PATIENT-LVL III: CPT | Mod: PBBFAC,,, | Performed by: FAMILY MEDICINE

## 2017-07-24 PROCEDURE — 1125F AMNT PAIN NOTED PAIN PRSNT: CPT | Mod: ,,, | Performed by: FAMILY MEDICINE

## 2017-07-24 PROCEDURE — 1159F MED LIST DOCD IN RCRD: CPT | Mod: ,,, | Performed by: FAMILY MEDICINE

## 2017-07-24 PROCEDURE — 99213 OFFICE O/P EST LOW 20 MIN: CPT | Mod: PBBFAC,PO | Performed by: FAMILY MEDICINE

## 2017-07-24 PROCEDURE — 99213 OFFICE O/P EST LOW 20 MIN: CPT | Mod: S$PBB,,, | Performed by: FAMILY MEDICINE

## 2017-07-24 NOTE — PROGRESS NOTES
Subjective:       Patient ID: Coy López is a 89 y.o. male.    Chief Complaint: Follow-up    Follow-up hypertension.  Home blood pressures are  systolic 130 range.  He denies headaches chest pain and palpitations shortness of breath.  He stopped Lipitor several months ago due to arthralgia.  He thinks he is improved.  He continues with l back pain and left leg sciatica usually in the morning and resolved after about an hour.  He has a prescription for tramadol that he is not using      Review of Systems   Constitutional: Negative for appetite change, fatigue and unexpected weight change.   Respiratory: Negative for cough, shortness of breath and wheezing.    Cardiovascular: Negative for chest pain, palpitations and leg swelling.   Gastrointestinal: Negative for abdominal pain.   Genitourinary: Negative for difficulty urinating.   Musculoskeletal: Positive for back pain.   Neurological: Negative for headaches.       Objective:      Physical Exam   Constitutional: He is oriented to person, place, and time. He appears well-developed and well-nourished. No distress.   Neck: Neck supple. No thyromegaly present.   Cardiovascular: Normal rate and normal heart sounds.    No murmur heard.  Regular rhythm with occasional premature beat   Pulmonary/Chest: Effort normal and breath sounds normal. No respiratory distress. He has no wheezes.   Abdominal: Soft. Bowel sounds are normal. He exhibits no mass. There is no tenderness.   Lymphadenopathy:     He has no cervical adenopathy.   Neurological: He is alert and oriented to person, place, and time.       Office Visit on 04/20/2017   Component Date Value Ref Range Status    WBC 04/20/2017 6.54  3.90 - 12.70 K/uL Final    RBC 04/20/2017 4.33* 4.60 - 6.20 M/uL Final    Hemoglobin 04/20/2017 13.2* 14.0 - 18.0 g/dL Final    Hematocrit 04/20/2017 39.3* 40.0 - 54.0 % Final    MCV 04/20/2017 91  82 - 98 fL Final    MCH 04/20/2017 30.5  27.0 - 31.0 pg Final    MCHC  04/20/2017 33.6  32.0 - 36.0 % Final    RDW 04/20/2017 12.8  11.5 - 14.5 % Final    Platelets 04/20/2017 244  150 - 350 K/uL Final    MPV 04/20/2017 10.6  9.2 - 12.9 fL Final    Gran # 04/20/2017 3.8  1.8 - 7.7 K/uL Final    Lymph # 04/20/2017 1.8  1.0 - 4.8 K/uL Final    Mono # 04/20/2017 0.6  0.3 - 1.0 K/uL Final    Eos # 04/20/2017 0.3  0.0 - 0.5 K/uL Final    Baso # 04/20/2017 0.03  0.00 - 0.20 K/uL Final    Gran% 04/20/2017 58.7  38.0 - 73.0 % Final    Lymph% 04/20/2017 27.2  18.0 - 48.0 % Final    Mono% 04/20/2017 9.3  4.0 - 15.0 % Final    Eosinophil% 04/20/2017 4.1  0.0 - 8.0 % Final    Basophil% 04/20/2017 0.5  0.0 - 1.9 % Final    Differential Method 04/20/2017 Automated   Final    Sodium 04/21/2017 142  136 - 145 mmol/L Final    Potassium 04/21/2017 3.9  3.5 - 5.1 mmol/L Final    Chloride 04/21/2017 105  95 - 110 mmol/L Final    CO2 04/21/2017 29  23 - 29 mmol/L Final    Glucose 04/21/2017 124* 70 - 110 mg/dL Final    BUN, Bld 04/21/2017 24  10 - 30 mg/dL Final    Creatinine 04/21/2017 1.2  0.5 - 1.4 mg/dL Final    Calcium 04/21/2017 9.3  8.7 - 10.5 mg/dL Final    Total Protein 04/21/2017 7.3  6.0 - 8.4 g/dL Final    Albumin 04/21/2017 3.8  3.5 - 5.2 g/dL Final    Total Bilirubin 04/21/2017 0.7  0.1 - 1.0 mg/dL Final    Alkaline Phosphatase 04/21/2017 64  55 - 135 U/L Final    AST 04/21/2017 26  10 - 40 U/L Final    ALT 04/21/2017 21  10 - 44 U/L Final    Anion Gap 04/21/2017 8  8 - 16 mmol/L Final    eGFR if African American 04/21/2017 >60.0  >60 mL/min/1.73 m^2 Final    eGFR if non African American 04/21/2017 52.2* >60 mL/min/1.73 m^2 Final    Specimen UA 04/20/2017 Urine, Clean Catch   Final    Color, UA 04/20/2017 Yellow  Yellow, Straw, Latoya Final    Appearance, UA 04/20/2017 Clear  Clear Final    pH, UA 04/20/2017 6.0  5.0 - 8.0 Final    Specific Gravity, UA 04/20/2017 1.020  1.005 - 1.030 Final    Protein, UA 04/20/2017 Negative  Negative Final    Glucose, UA  04/20/2017 Negative  Negative Final    Ketones, UA 04/20/2017 Negative  Negative Final    Bilirubin (UA) 04/20/2017 Negative  Negative Final    Occult Blood UA 04/20/2017 Negative  Negative Final    Nitrite, UA 04/20/2017 Negative  Negative Final    Urobilinogen, UA 04/20/2017 Negative  <2.0 EU/dL Final    Leukocytes, UA 04/20/2017 Negative  Negative Final     Assessment:       No diagnosis found.    Plan:   Blood pressure repeat 116/65 left arm sitting.  Will continue current medications.  Follow-up in one month for recheck lipids.  Tylenol 2 tablets at bedtime or 2 tablets in the morning for back pain.  Consider tramadol if Tylenol not working. discussed lift cushion to aid in getting out of chair

## 2017-08-02 ENCOUNTER — TELEPHONE (OUTPATIENT)
Dept: INTERNAL MEDICINE | Facility: CLINIC | Age: 82
End: 2017-08-02

## 2017-08-02 NOTE — TELEPHONE ENCOUNTER
Pt daughter came in clinic states he is still in pain and would like to try the injections that was discussed. [phone]-[phone]. Please advise.

## 2017-08-03 DIAGNOSIS — M54.42 CHRONIC BILATERAL LOW BACK PAIN WITH LEFT-SIDED SCIATICA: Primary | ICD-10-CM

## 2017-08-03 DIAGNOSIS — G89.29 CHRONIC BILATERAL LOW BACK PAIN WITH LEFT-SIDED SCIATICA: Primary | ICD-10-CM

## 2017-08-03 NOTE — TELEPHONE ENCOUNTER
Informed pt that I contacted Ester but no answer, gave pt info on his apt with Dr. Glasgow. Pt wrote it down.

## 2017-08-08 ENCOUNTER — OFFICE VISIT (OUTPATIENT)
Dept: PAIN MEDICINE | Facility: CLINIC | Age: 82
End: 2017-08-08
Payer: MEDICARE

## 2017-08-08 VITALS
DIASTOLIC BLOOD PRESSURE: 67 MMHG | HEART RATE: 58 BPM | BODY MASS INDEX: 23.39 KG/M2 | HEIGHT: 64 IN | RESPIRATION RATE: 16 BRPM | SYSTOLIC BLOOD PRESSURE: 184 MMHG | WEIGHT: 137 LBS

## 2017-08-08 DIAGNOSIS — M48.061 STENOSIS, SPINAL, LUMBAR: ICD-10-CM

## 2017-08-08 DIAGNOSIS — M54.16 LUMBAR RADICULOPATHY: Primary | ICD-10-CM

## 2017-08-08 DIAGNOSIS — M47.817 SPONDYLOSIS OF LUMBOSACRAL REGION WITHOUT MYELOPATHY OR RADICULOPATHY: ICD-10-CM

## 2017-08-08 PROCEDURE — 1159F MED LIST DOCD IN RCRD: CPT | Mod: ,,, | Performed by: ANESTHESIOLOGY

## 2017-08-08 PROCEDURE — 1125F AMNT PAIN NOTED PAIN PRSNT: CPT | Mod: ,,, | Performed by: ANESTHESIOLOGY

## 2017-08-08 PROCEDURE — 99999 PR PBB SHADOW E&M-EST. PATIENT-LVL III: CPT | Mod: PBBFAC,,, | Performed by: ANESTHESIOLOGY

## 2017-08-08 PROCEDURE — 99204 OFFICE O/P NEW MOD 45 MIN: CPT | Mod: S$PBB,,, | Performed by: ANESTHESIOLOGY

## 2017-08-08 PROCEDURE — 99213 OFFICE O/P EST LOW 20 MIN: CPT | Mod: PBBFAC | Performed by: ANESTHESIOLOGY

## 2017-08-08 NOTE — PROGRESS NOTES
Chief Pain Complaint:  Lower back pain, bilateral leg pain    History of Present Illness:   This patient is a 89 y.o. male who presents today complaining of the above noted pain/s. The patient describes the pain as follows.    - duration of pain: 5 years   - timing: constant   - character: aching, aching  - radiating, dermatomal: extends into bilateral lower extremities posteriorly  - antecedent trauma, prior spinal surgery: no prior trauma, no prior spinal surgery   - pertinent negatives: No fever, No chills, No weight loss, No bladder dysfunction, No bowel dysfunction, No saddle anesthesia  - pertinent positives: generalized nonspecific Lower Extremity weakness bilaterally    - medications, other therapies tried (physical therapy, injections):     >> Tylenol, Tramadol    >> Has NOT previously undergone Physical Therapy    >> Has previously undergone spinal injection/s, perhaps years prior      Imaging / Labs / Studies (reviewed on 8/8/2017):    * CT abdomen from 2015 reviewed, see EMR    * Lumbar MRI from 2012, [facility] reviewed      Review of Systems:  CONSTITUTIONAL: patient denies any fever, chills, or weight loss  SKIN: patient denies any rash or itching  RESPIRATORY: patient denies having any shortness of breath  GASTROINTESTINAL: patient denies having any diarrhea, constipation, or bowel incontinence  GENITOURINARY: patient denies having any abnormal bladder function    MUSCULOSKELETAL:  - patient complains of the above noted pain/s (see chief pain complaint)    NEUROLOGICAL:   - pain as above  - strength in Lower extremities is decreased, BILATERALLY  - sensation in Lower extremities is abnormal, BILATERALLY  - patient denies any loss of bowel or bladder control      PSYCHIATRIC: patient reports a history of anxiety    Other:  All other systems reviewed and are negative      Physical Exam:  BP (!) 184/67 (BP Location: Right arm, Patient Position: Sitting, BP Method: Automatic)   Pulse (!) 58    "Resp 16   Ht 5' 4" (1.626 m)   Wt 62.1 kg (137 lb)   BMI 23.52 kg/m²  (reviewed on 8/8/2017)  General: alert and oriented, in no apparent distress  Gait: normal gait  Skin: No rashes, No discoloration, No obvious lesions  HEENT: EOMI  Cardiovascular: no significant peripheral edema present  Respiratory: respirations nonlabored    Musculoskeletal:  - Any pain on flexion, extension, rotation:    >> pain on extension and rotation  - Straight Leg Raise:     >> LEFT :: negative    >> RIGHT :: negative    - Any tenderness to palpation across paraspinal muscles, joints, bursae:     >> across lumbar paraspinals    Neuro:  - Extremity Strength:     >> LEFT :: 5/5    >> RIGHT :: 5/5  - Extremity Reflexes:    >> LEFT  :: 2+    >> RIGHT :: 2+    Psych:  Mood and affect is appropriate      Assessment:  Lumbar Radiculopathy  Spinal stenosis   Lumbar spondylosis      Plan:  Patient presents today complaining of bilateral lower extremity pain which extends posteriorly.  He reports that his pain is worse in the mornings, he has difficulty getting up and moving around in the mornings.  He notes that this pain tapers after he is up for a couple of hours.  He has taken tramadol and Tylenol for his pain.  He states that they don't help that much.  I discuss treatment options including adjusting his medications or spinal injections.  He expresses low-spirited interest in a spinal injection.  I recommend he have a lumbar MRI of his back but he is opposed to getting one.  I review his CT of the abdomen from 2015, see EMR.  He has spinal stenosis at L3/4, I will order a bilateral L3/4 transforaminal BRITNI.  Another option would be prescribing Norco 5/325 mg tablets and have him take 0.5 tablet in the morning, titrating up to 1 tablet if the smaller dose is ineffective.  I will not prescribe any pain medications.  Imaging / studies reviewed, detailed above.  I discussed in detail the risks, benefits, and alternatives to any and all " potential treatment options.  All questions and concerns were fully addressed today in clinic.      Disclaimer:  This note may have been prepared using voice recognition software, it may have not been extensively proofed, as such there could be errors within the text such as sound alike errors.

## 2017-08-08 NOTE — LETTER
August 8, 2017      Roscoe Boyd MD  [address] [place] [address] [address] [place] [address]           [facility]  [id number] [place] [address]  [place] [address]  Phone: [phone]  Fax: [phone]          Patient: Coy López   MR Number: [id number]   YOB: 1924   Date of Visit: 8/8/2017       Dear Dr. Roscoe Boyd:    Thank you for referring Coy López to me for evaluation. Attached you will find relevant portions of my assessment and plan of care.    If you have questions, please do not hesitate to call me. I look forward to following Coy López along with you.    Sincerely,    Yonatan Glasgow MD    Enclosure  CC:  No Recipients    If you would like to receive this communication electronically, please contact [email or web address] or [phone] to request more information on [facility] access.    For providers and/or their staff who would like to refer a patient to [facility], please contact us through our one-stop-shop provider referral line, [facility], at [phone].    If you feel you have received this communication in error or would no longer like to receive these types of communications, please e-mail [email or web address]

## 2017-08-30 ENCOUNTER — HOSPITAL ENCOUNTER (OUTPATIENT)
Dept: RADIOLOGY | Facility: HOSPITAL | Age: 82
Discharge: HOME | End: 2017-08-30
Attending: PHYSICIAN ASSISTANT | Admitting: ANESTHESIOLOGY
Payer: MEDICARE

## 2017-08-30 ENCOUNTER — HOSPITAL ENCOUNTER (OUTPATIENT)
Facility: HOSPITAL | Age: 82
Discharge: HOME | End: 2017-08-30
Attending: ANESTHESIOLOGY | Admitting: ANESTHESIOLOGY
Payer: MEDICARE

## 2017-08-30 VITALS
RESPIRATION RATE: 17 BRPM | BODY MASS INDEX: 23.32 KG/M2 | TEMPERATURE: 98 F | HEART RATE: 69 BPM | OXYGEN SATURATION: 98 % | HEIGHT: 65 IN | SYSTOLIC BLOOD PRESSURE: 220 MMHG | DIASTOLIC BLOOD PRESSURE: 104 MMHG | WEIGHT: 140 LBS

## 2017-08-30 DIAGNOSIS — M54.16 LUMBAR RADICULOPATHY: ICD-10-CM

## 2017-08-30 DIAGNOSIS — M54.16 BILATERAL LUMBAR RADICULOPATHY: Primary | ICD-10-CM

## 2017-08-30 PROCEDURE — 64483 NJX AA&/STRD TFRM EPI L/S 1: CPT | Mod: 50

## 2017-08-30 PROCEDURE — 63600175 PHARM REV CODE 636 W HCPCS

## 2017-08-30 PROCEDURE — 64483 NJX AA&/STRD TFRM EPI L/S 1: CPT | Mod: 50,,, | Performed by: ANESTHESIOLOGY

## 2017-08-30 PROCEDURE — 25500020 PHARM REV CODE 255

## 2017-08-30 PROCEDURE — 63600175 PHARM REV CODE 636 W HCPCS: Performed by: ANESTHESIOLOGY

## 2017-08-30 PROCEDURE — 25000003 PHARM REV CODE 250: Performed by: ANESTHESIOLOGY

## 2017-08-30 PROCEDURE — 25000003 PHARM REV CODE 250

## 2017-08-30 RX ORDER — DEXAMETHASONE SODIUM PHOSPHATE 4 MG/ML
INJECTION, SOLUTION INTRA-ARTICULAR; INTRALESIONAL; INTRAMUSCULAR; INTRAVENOUS; SOFT TISSUE
Status: DISCONTINUED | OUTPATIENT
Start: 2017-08-30 | End: 2017-08-30 | Stop reason: HOSPADM

## 2017-08-30 RX ORDER — LIDOCAINE HYDROCHLORIDE 20 MG/ML
INJECTION, SOLUTION INFILTRATION; PERINEURAL
Status: DISCONTINUED | OUTPATIENT
Start: 2017-08-30 | End: 2017-08-30 | Stop reason: HOSPADM

## 2017-08-30 NOTE — DISCHARGE SUMMARY
[facility]  Discharge Note       Description of Procedure: Lumbar Transforaminal Epidural Steroid Injection under Fluoroscopic Guidance    Procedure Date: 8/30/2017    Admit Date: 8/30/2017  Discharge Date: 8/30/2017     Attending Physician: Yonatan Glasgow   Discharge Provider: Yonatan Glasgow    Preoperative Diagnosis:   Active Hospital Problems    Diagnosis  POA    Lumbar radiculopathy [M54.16]  Yes     Priority: High      Resolved Hospital Problems    Diagnosis Date Resolved POA   No resolved problems to display.        Postoperative Diagnosis: as above, same as preoperative diagnosis    Discharged Condition: Stable    Hospital Course: Patient was admitted for an outpatient procedure. The procedure was tolerated well with no complications.    Final Diagnoses: Same as principal problem.    Disposition: Home, self-care.    Follow up/Patient Instructions:  Follow-up in clinic in 2-3 weeks.    Medications: No medications were prescribed today. The patient was advised to resume normal medication regimen without change.  Specific information was provided regarding restarting any anticoagulant/s.    Discharge Procedure Orders (must include Diet, Follow-up, Activity):  Light activity for the remainder of the day, resume normal activity tomorrow. Resume normal diet. Follow-up in clinic in 2-3 weeks.

## 2017-08-30 NOTE — PLAN OF CARE
Problem: Patient Care Overview  Goal: Plan of Care Review  Outcome: Outcome(s) achieved Date Met: 08/30/17  Patient d/c home in stable condition via wheelchair with ride. Patient educated to take BP medication when he gets home and educated on importance of taking before pain injection. Verbalized understanding of d/c instructions. Patient voiced no complaints at this time. Patient stood at side of bed, walked steps with no new motor deficits. Neurologically intact.

## 2017-08-30 NOTE — OP NOTE
"Procedure: Lumbar Transforaminal Epidural Steroid Injection under Fluoroscopic Guidance (supraneural approach)    Level: L3/4     Side: Bilateral    PROCEDURE DATE: 8/30/2017    Pre-operative Diagnosis: Lumbar Radiculopathy  Post-operative Diagnosis: Lumbar Radiculopathy    Provider: Yonatan Glasgow MD  Assistant(s): None    Anesthesia: Local, No Sedation    >> 0 mg of VERSED    >> 0 mcg of FENTANYL     Indication: Low back pain with radiculopathy consistent with distribution of targeted nerve. Symptoms unresponsive to conservative treatments. Fluoroscopy was used to optimize visualization of needle placement and to maximize safety.     Procedure Description / Technique:  The patient was seen and identified in the preoperative area. Risks, benefits, complications, and alternatives were discussed with the patient. The patient agreed to proceed with the procedure and signed the consent. The site and side of the procedure was identified and marked. An IV was not placed for this procedure. The patient was taken to the procedural suite.    The patient was positioned in prone orientation on procedure table and a pillow was placed under the abdomen to reduce lumbar lordosis. A time out was performed prior to any intervention. The procedure, site, side, and allergies were stated and agreed to by all present. The lumbosacral area was widely prepped with ChloraPrep. The procedural site was draped in usual sterile fashion. Vital signs were closely monitored throughout this procedure. Conscious sedation was not used for this procedure.    The target area was visualized under fluoroscopy. The cephalocaudal angle of the fluoroscope was adjusted as to align the vertebral end plates. The fluoroscopic arm was rotated ipsilaterally to an angle of approximately 30 degrees until the "bigg dog" outline came into view and the tip of the inferior superior articular process pointed towards the midline, 6:00 position of the above pedicle. " "A 25 gauge 3.5 inch spinal needle was directed towards the "chin" of the "bigg dog" (adjacent to the pars interarticularis and inferior to the pedicle). The needle was advanced until OS was met at the inferior border of the pedicle / pars interface. The needle was adjusted so that it would pass inferior to the osseous border. The fluoroscope was then placed in the lateral position and the needle was slowly advanced until it rested in the posterior 1/3rd of the vertebral foramen. AP fluoroscopy was checked and the needle tip rested at the 6:00 position under the pedicle. No paresthesia was elicited during needle placement. With the needle tip in its final position, gentle aspiration was negative for blood and CSF. Omnipaque 240 (1 to 2 mL) was injected under live fluoroscopy. Microbore tubing was used for injection. There was no pain or paresthesia on injection. The contrast clearly delineated the targeted nerve root on AP fluoroscopy. No vascular uptake was seen. A solution containing 3 mL of 1% PF Lidocaine and 1.5 mL of Dexamethasone (10 mg/mL) was mixed and 2 mL was injected slowly at each level targeted. There was minimal resistance on injection. No pain or paresthesia was elicited on injection. The stylet was replaced and the needle was withdrawn intact. This procedure was performed for each of the above indicated levels.     Description of Findings: Not applicable    Prosthetic devices, grafts, tissues, or devices implanted: None    Specimen Removed: No    Estimated Blood Loss: minimal    COMPLICATIONS: None    DISPOSITION / PLANS: The patient was transferred to the recovery area in a stable condition for observation. The patient was reexamined prior to discharge. There was no evidence of acute neurologic injury following the procedure.  Patient was discharged from the recovery room after meeting discharge criteria. Home discharge instructions were given to the patient by the staff.    "

## 2017-09-06 ENCOUNTER — OFFICE VISIT (OUTPATIENT)
Dept: INTERNAL MEDICINE | Facility: CLINIC | Age: 82
End: 2017-09-06
Payer: MEDICARE

## 2017-09-06 VITALS
DIASTOLIC BLOOD PRESSURE: 60 MMHG | BODY MASS INDEX: 22.8 KG/M2 | OXYGEN SATURATION: 96 % | TEMPERATURE: 98 F | HEIGHT: 65 IN | WEIGHT: 136.81 LBS | SYSTOLIC BLOOD PRESSURE: 115 MMHG | HEART RATE: 58 BPM

## 2017-09-06 DIAGNOSIS — E78.5 HYPERLIPIDEMIA, UNSPECIFIED HYPERLIPIDEMIA TYPE: ICD-10-CM

## 2017-09-06 DIAGNOSIS — R53.1 WEAKNESS: ICD-10-CM

## 2017-09-06 DIAGNOSIS — I10 ESSENTIAL HYPERTENSION: Primary | ICD-10-CM

## 2017-09-06 LAB
BASOPHILS # BLD AUTO: 0.02 K/UL
BASOPHILS NFR BLD: 0.2 %
DIFFERENTIAL METHOD: ABNORMAL
EOSINOPHIL # BLD AUTO: 0.2 K/UL
EOSINOPHIL NFR BLD: 2.1 %
ERYTHROCYTE [DISTWIDTH] IN BLOOD BY AUTOMATED COUNT: 13.4 %
HCT VFR BLD AUTO: 37.6 %
HGB BLD-MCNC: 13.2 G/DL
LYMPHOCYTES # BLD AUTO: 2.4 K/UL
LYMPHOCYTES NFR BLD: 28.5 %
MCH RBC QN AUTO: 30.6 PG
MCHC RBC AUTO-ENTMCNC: 35.1 G/DL
MCV RBC AUTO: 87 FL
MONOCYTES # BLD AUTO: 0.9 K/UL
MONOCYTES NFR BLD: 10.9 %
NEUTROPHILS # BLD AUTO: 4.9 K/UL
NEUTROPHILS NFR BLD: 58.3 %
PLATELET # BLD AUTO: 209 K/UL
PMV BLD AUTO: 11.8 FL
RBC # BLD AUTO: 4.32 M/UL
WBC # BLD AUTO: 8.38 K/UL

## 2017-09-06 PROCEDURE — 99213 OFFICE O/P EST LOW 20 MIN: CPT | Mod: S$PBB,,, | Performed by: FAMILY MEDICINE

## 2017-09-06 PROCEDURE — 80053 COMPREHEN METABOLIC PANEL: CPT

## 2017-09-06 PROCEDURE — 99213 OFFICE O/P EST LOW 20 MIN: CPT | Mod: PBBFAC,PO | Performed by: FAMILY MEDICINE

## 2017-09-06 PROCEDURE — 99999 PR PBB SHADOW E&M-EST. PATIENT-LVL III: CPT | Mod: PBBFAC,,, | Performed by: FAMILY MEDICINE

## 2017-09-06 PROCEDURE — 80061 LIPID PANEL: CPT

## 2017-09-06 PROCEDURE — 1159F MED LIST DOCD IN RCRD: CPT | Mod: ,,, | Performed by: FAMILY MEDICINE

## 2017-09-06 PROCEDURE — 85025 COMPLETE CBC W/AUTO DIFF WBC: CPT

## 2017-09-06 PROCEDURE — 1125F AMNT PAIN NOTED PAIN PRSNT: CPT | Mod: ,,, | Performed by: FAMILY MEDICINE

## 2017-09-06 RX ORDER — CHOLECALCIFEROL (VITAMIN D3) 25 MCG
1000 TABLET ORAL DAILY
COMMUNITY

## 2017-09-06 NOTE — PROGRESS NOTES
Subjective:       Patient ID: Coy López is a 89 y.o. male.    Chief Complaint: Follow-up    Follow-up hypertension hyperlipidemia low back pain.  He reports back pain resolved after recent lumbar injection.  He no longer using tramadol or Tylenol.  He denies any headache or chest pain palpitations or shortness of breath.  Denies edema.  He is no longer taking Lipitor.      Review of Systems   Constitutional: Negative for appetite change, fatigue and unexpected weight change.   Respiratory: Negative for cough, shortness of breath and wheezing.    Cardiovascular: Negative for chest pain, palpitations and leg swelling.   Gastrointestinal: Negative for abdominal pain.   Genitourinary: Negative for difficulty urinating.   Neurological: Positive for weakness. Negative for headaches.        He reports some generalized weakness.  He is trying to exercise to 3 times a day mostly by walking.  He's had physical therapy in the past and is not interested in resuming at this time       Objective:      Physical Exam   Constitutional: He is oriented to person, place, and time. He appears well-developed and well-nourished. No distress.   Neck: Neck supple. No thyromegaly present.   Cardiovascular: Normal rate, regular rhythm and normal heart sounds.    No murmur heard.  Pulmonary/Chest: Effort normal and breath sounds normal. No respiratory distress. He has no wheezes.   Abdominal: Soft. Bowel sounds are normal. He exhibits no mass. There is no tenderness.   Lymphadenopathy:     He has no cervical adenopathy.   Neurological: He is alert and oriented to person, place, and time.       Office Visit on 04/20/2017   Component Date Value Ref Range Status    WBC 04/20/2017 6.54  3.90 - 12.70 K/uL Final    RBC 04/20/2017 4.33* 4.60 - 6.20 M/uL Final    Hemoglobin 04/20/2017 13.2* 14.0 - 18.0 g/dL Final    Hematocrit 04/20/2017 39.3* 40.0 - 54.0 % Final    MCV 04/20/2017 91  82 - 98 fL Final    MCH 04/20/2017 30.5  27.0 -  31.0 pg Final    MCHC 04/20/2017 33.6  32.0 - 36.0 % Final    RDW 04/20/2017 12.8  11.5 - 14.5 % Final    Platelets 04/20/2017 244  150 - 350 K/uL Final    MPV 04/20/2017 10.6  9.2 - 12.9 fL Final    Gran # 04/20/2017 3.8  1.8 - 7.7 K/uL Final    Lymph # 04/20/2017 1.8  1.0 - 4.8 K/uL Final    Mono # 04/20/2017 0.6  0.3 - 1.0 K/uL Final    Eos # 04/20/2017 0.3  0.0 - 0.5 K/uL Final    Baso # 04/20/2017 0.03  0.00 - 0.20 K/uL Final    Gran% 04/20/2017 58.7  38.0 - 73.0 % Final    Lymph% 04/20/2017 27.2  18.0 - 48.0 % Final    Mono% 04/20/2017 9.3  4.0 - 15.0 % Final    Eosinophil% 04/20/2017 4.1  0.0 - 8.0 % Final    Basophil% 04/20/2017 0.5  0.0 - 1.9 % Final    Differential Method 04/20/2017 Automated   Final    Sodium 04/21/2017 142  136 - 145 mmol/L Final    Potassium 04/21/2017 3.9  3.5 - 5.1 mmol/L Final    Chloride 04/21/2017 105  95 - 110 mmol/L Final    CO2 04/21/2017 29  23 - 29 mmol/L Final    Glucose 04/21/2017 124* 70 - 110 mg/dL Final    BUN, Bld 04/21/2017 24  10 - 30 mg/dL Final    Creatinine 04/21/2017 1.2  0.5 - 1.4 mg/dL Final    Calcium 04/21/2017 9.3  8.7 - 10.5 mg/dL Final    Total Protein 04/21/2017 7.3  6.0 - 8.4 g/dL Final    Albumin 04/21/2017 3.8  3.5 - 5.2 g/dL Final    Total Bilirubin 04/21/2017 0.7  0.1 - 1.0 mg/dL Final    Alkaline Phosphatase 04/21/2017 64  55 - 135 U/L Final    AST 04/21/2017 26  10 - 40 U/L Final    ALT 04/21/2017 21  10 - 44 U/L Final    Anion Gap 04/21/2017 8  8 - 16 mmol/L Final    eGFR if African American 04/21/2017 >60.0  >60 mL/min/1.73 m^2 Final    eGFR if non African American 04/21/2017 52.2* >60 mL/min/1.73 m^2 Final    Specimen UA 04/20/2017 Urine, Clean Catch   Final    Color, UA 04/20/2017 Yellow  Yellow, Straw, Latoya Final    Appearance, UA 04/20/2017 Clear  Clear Final    pH, UA 04/20/2017 6.0  5.0 - 8.0 Final    Specific Gravity, UA 04/20/2017 1.020  1.005 - 1.030 Final    Protein, UA 04/20/2017 Negative  Negative  Final    Glucose, UA 04/20/2017 Negative  Negative Final    Ketones, UA 04/20/2017 Negative  Negative Final    Bilirubin (UA) 04/20/2017 Negative  Negative Final    Occult Blood UA 04/20/2017 Negative  Negative Final    Nitrite, UA 04/20/2017 Negative  Negative Final    Urobilinogen, UA 04/20/2017 Negative  <2.0 EU/dL Final    Leukocytes, UA 04/20/2017 Negative  Negative Final     Assessment:       1. Essential hypertension    2. Hyperlipidemia, unspecified hyperlipidemia type         blood pressures controlled will continue current medications.  CBC CMP lipids.  Ordered.  Discussed increased physical activity.  Follow-up in 6 weeks

## 2017-09-07 LAB
ALBUMIN SERPL BCP-MCNC: 3.7 G/DL
ALP SERPL-CCNC: 59 U/L
ALT SERPL W/O P-5'-P-CCNC: 16 U/L
ANION GAP SERPL CALC-SCNC: 12 MMOL/L
AST SERPL-CCNC: 32 U/L
BILIRUB SERPL-MCNC: 0.8 MG/DL
BUN SERPL-MCNC: 24 MG/DL
CALCIUM SERPL-MCNC: 9.5 MG/DL
CHLORIDE SERPL-SCNC: 109 MMOL/L
CHOLEST SERPL-MCNC: 178 MG/DL
CHOLEST/HDLC SERPL: 3.7 {RATIO}
CO2 SERPL-SCNC: 22 MMOL/L
CREAT SERPL-MCNC: 1.1 MG/DL
EST. GFR  (AFRICAN AMERICAN): >60 ML/MIN/1.73 M^2
EST. GFR  (NON AFRICAN AMERICAN): 58 ML/MIN/1.73 M^2
GLUCOSE SERPL-MCNC: 83 MG/DL
HDLC SERPL-MCNC: 48 MG/DL
HDLC SERPL: 27 %
LDLC SERPL CALC-MCNC: 112 MG/DL
NONHDLC SERPL-MCNC: 130 MG/DL
POTASSIUM SERPL-SCNC: 5 MMOL/L
PROT SERPL-MCNC: 7.7 G/DL
SODIUM SERPL-SCNC: 143 MMOL/L
TRIGL SERPL-MCNC: 90 MG/DL

## 2017-09-12 ENCOUNTER — OFFICE VISIT (OUTPATIENT)
Dept: PAIN MEDICINE | Facility: CLINIC | Age: 82
End: 2017-09-12
Payer: MEDICARE

## 2017-09-12 VITALS
RESPIRATION RATE: 16 BRPM | HEART RATE: 57 BPM | BODY MASS INDEX: 22.66 KG/M2 | WEIGHT: 136 LBS | DIASTOLIC BLOOD PRESSURE: 68 MMHG | SYSTOLIC BLOOD PRESSURE: 158 MMHG | HEIGHT: 65 IN

## 2017-09-12 DIAGNOSIS — M47.817 SPONDYLOSIS OF LUMBOSACRAL REGION WITHOUT MYELOPATHY OR RADICULOPATHY: ICD-10-CM

## 2017-09-12 DIAGNOSIS — M48.061 STENOSIS, SPINAL, LUMBAR: ICD-10-CM

## 2017-09-12 DIAGNOSIS — M51.36 DDD (DEGENERATIVE DISC DISEASE), LUMBAR: ICD-10-CM

## 2017-09-12 DIAGNOSIS — M54.16 LUMBAR RADICULOPATHY: Primary | ICD-10-CM

## 2017-09-12 PROCEDURE — 99213 OFFICE O/P EST LOW 20 MIN: CPT | Mod: S$PBB,,, | Performed by: PHYSICIAN ASSISTANT

## 2017-09-12 PROCEDURE — 99999 PR PBB SHADOW E&M-EST. PATIENT-LVL III: CPT | Mod: PBBFAC,,, | Performed by: PHYSICIAN ASSISTANT

## 2017-09-12 PROCEDURE — 1125F AMNT PAIN NOTED PAIN PRSNT: CPT | Mod: ,,, | Performed by: PHYSICIAN ASSISTANT

## 2017-09-12 PROCEDURE — 1159F MED LIST DOCD IN RCRD: CPT | Mod: ,,, | Performed by: PHYSICIAN ASSISTANT

## 2017-09-12 PROCEDURE — 99213 OFFICE O/P EST LOW 20 MIN: CPT | Mod: PBBFAC | Performed by: PHYSICIAN ASSISTANT

## 2017-09-12 NOTE — PROGRESS NOTES
Chief Pain Complaint:  Lower back pain, bilateral leg pain    History of Present Illness:   This patient is a 89 y.o. male who presents today complaining of the above noted pain/s. The patient describes the pain as follows.    - duration of pain: 5 years   - timing: constant   - character: aching, aching  - radiating, dermatomal: extends into bilateral lower extremities posteriorly  - antecedent trauma, prior spinal surgery: no prior trauma, no prior spinal surgery   - pertinent negatives: No fever, No chills, No weight loss, No bladder dysfunction, No bowel dysfunction, No saddle anesthesia  - pertinent positives: generalized nonspecific Lower Extremity weakness bilaterally    - medications, other therapies tried (physical therapy, injections):     >> Tylenol, Tramadol    >> Has NOT previously undergone Physical Therapy    >> Has previously undergone spinal injection/s: perhaps years prior, bilateral L3/4 TF BRITNI on 8-30-17      Imaging / Labs / Studies (reviewed on 9/12/2017):    * CT abdomen from 2015 reviewed, see EMR    * Lumbar MRI from 2012, [facility] reviewed        Review of Systems:  CONSTITUTIONAL: patient denies any fever, chills, or weight loss  SKIN: patient denies any rash or itching  RESPIRATORY: patient denies having any shortness of breath  GASTROINTESTINAL: patient denies having any diarrhea, constipation, or bowel incontinence  GENITOURINARY: patient denies having any abnormal bladder function    MUSCULOSKELETAL:  - patient complains of the above noted pain/s (see chief pain complaint)    NEUROLOGICAL:   - pain as above  - strength in Lower extremities is decreased, BILATERALLY  - sensation in Lower extremities is abnormal, BILATERALLY  - patient denies any loss of bowel or bladder control      PSYCHIATRIC: patient reports a history of anxiety    Other:  All other systems reviewed and are negative      Physical Exam:  Vitals:  BP (!) 158/68 (BP Location: Right arm, Patient Position:  "Sitting, BP Method: Large (Automatic))   Pulse (!) 57   Resp 16   Ht 5' 5" (1.651 m)   Wt 61.7 kg (136 lb)   BMI 22.63 kg/m²    (reviewed on 9/12/2017)    General: alert and oriented, in no apparent distress  Gait: normal gait  Skin: No rashes, No discoloration, No obvious lesions  HEENT: EOMI  Cardiovascular: no significant peripheral edema present  Respiratory: respirations nonlabored    Musculoskeletal:  - Any pain on flexion, extension, rotation:    >> pain on extension and rotation  - Straight Leg Raise:     >> LEFT :: negative    >> RIGHT :: negative  - Any tenderness to palpation across paraspinal muscles, joints, bursae:     >> across lumbar paraspinals    Neuro:  - Extremity Strength:     >> LEFT :: 5/5    >> RIGHT :: 5/5  - Extremity Reflexes:    >> LEFT  :: 2+    >> RIGHT :: 2+    Psych:  Mood and affect is appropriate        Assessment:  Lumbar Radiculopathy  Spinal stenosis   Lumbar spondylosis      Plan:  Patient presents today for follow-up. He complains of posterior bilateral lower extremity pain.  He reports that his pain is worse in the mornings, as he has difficulty getting up and moving around, and then, this pain tapers after he is up for a couple of hours.  He has tried tramadol and Tylenol for his pain, which didn't help much.  We previously recommend he have a lumbar MRI of his back, but he is opposed to getting one. Abdominal CT from 2015 shows spinal stenosis at L3/4.  - S/p bilateral L3/4 TF BRITNI on 8-30-17 with excellent relief. He reports he is walking better and exercising more, and his daughter reports the same. He feels much better than prior to the injection.  - We could also consider Norco 5/325 mg, where he could take 0.5 to 1 tablet in the morning if needed, but we can hold off on this as he is feeling much better.  RTC PRN. I discussed the risks, benefits, and alternatives to potential treatment options. All questions and concerns were fully addressed today in clinic. Dr." Myah was consulted regarding the patient plan and agrees.

## 2017-10-23 ENCOUNTER — OFFICE VISIT (OUTPATIENT)
Dept: INTERNAL MEDICINE | Facility: CLINIC | Age: 82
End: 2017-10-23
Payer: MEDICARE

## 2017-10-23 VITALS
OXYGEN SATURATION: 98 % | WEIGHT: 136.69 LBS | HEART RATE: 64 BPM | SYSTOLIC BLOOD PRESSURE: 129 MMHG | BODY MASS INDEX: 22.75 KG/M2 | DIASTOLIC BLOOD PRESSURE: 58 MMHG | TEMPERATURE: 96 F

## 2017-10-23 DIAGNOSIS — E78.5 HYPERLIPIDEMIA, UNSPECIFIED HYPERLIPIDEMIA TYPE: Primary | ICD-10-CM

## 2017-10-23 DIAGNOSIS — Z28.39 IMMUNIZATION DEFICIENCY: ICD-10-CM

## 2017-10-23 DIAGNOSIS — R53.1 WEAKNESS: ICD-10-CM

## 2017-10-23 PROCEDURE — 99213 OFFICE O/P EST LOW 20 MIN: CPT | Mod: PBBFAC,PO | Performed by: FAMILY MEDICINE

## 2017-10-23 PROCEDURE — 99999 PR PBB SHADOW E&M-EST. PATIENT-LVL III: CPT | Mod: PBBFAC,,, | Performed by: FAMILY MEDICINE

## 2017-10-23 PROCEDURE — 99213 OFFICE O/P EST LOW 20 MIN: CPT | Mod: S$PBB,,, | Performed by: FAMILY MEDICINE

## 2017-10-23 NOTE — PROGRESS NOTES
Subjective:       Patient ID: Coy López is a 89 y.o. male.    Chief Complaint: 6 week follow up    Follow-up hypertension and weakness.  He reports he experiences less weakness.  He is walking about 1 block a day without difficulty.   He has low back pain early in the morning associated with degenerative arthritis and improved activity.      Review of Systems   Constitutional: Negative for appetite change, fatigue and unexpected weight change.   Respiratory: Negative for cough, shortness of breath and wheezing.    Cardiovascular: Negative for chest pain, palpitations and leg swelling.   Gastrointestinal: Negative for abdominal pain.   Genitourinary: Negative for difficulty urinating.   Musculoskeletal: Positive for back pain.   Neurological: Negative for headaches.       Objective:      Physical Exam   Constitutional: He is oriented to person, place, and time. He appears well-developed and well-nourished. No distress.   Neck: Neck supple. No thyromegaly present.   Cardiovascular: Normal rate, regular rhythm and normal heart sounds.    No murmur heard.  Pulmonary/Chest: Effort normal and breath sounds normal. No respiratory distress. He has no wheezes.   Abdominal: Soft. Bowel sounds are normal. He exhibits no mass. There is no tenderness.   Musculoskeletal:   He's able to get up out of his chair with no difficulty and onto the exam table with minimal assistance.   Lymphadenopathy:     He has no cervical adenopathy.   Neurological: He is alert and oriented to person, place, and time.       Office Visit on 09/06/2017   Component Date Value Ref Range Status    Sodium 09/07/2017 143  136 - 145 mmol/L Final    Potassium 09/07/2017 5.0  3.5 - 5.1 mmol/L Final    Chloride 09/07/2017 109  95 - 110 mmol/L Final    CO2 09/07/2017 22* 23 - 29 mmol/L Final    Glucose 09/07/2017 83  70 - 110 mg/dL Final    BUN, Bld 09/07/2017 24  10 - 30 mg/dL Final    Creatinine 09/07/2017 1.1  0.5 - 1.4 mg/dL Final     Calcium 09/07/2017 9.5  8.7 - 10.5 mg/dL Final    Total Protein 09/07/2017 7.7  6.0 - 8.4 g/dL Final    Albumin 09/07/2017 3.7  3.5 - 5.2 g/dL Final    Total Bilirubin 09/07/2017 0.8  0.1 - 1.0 mg/dL Final    Alkaline Phosphatase 09/07/2017 59  55 - 135 U/L Final    AST 09/07/2017 32  10 - 40 U/L Final    ALT 09/07/2017 16  10 - 44 U/L Final    Anion Gap 09/07/2017 12  8 - 16 mmol/L Final    eGFR if African American 09/07/2017 >60.0  >60 mL/min/1.73 m^2 Final    eGFR if non African American 09/07/2017 58.0* >60 mL/min/1.73 m^2 Final    Cholesterol 09/07/2017 178  120 - 199 mg/dL Final    Triglycerides 09/07/2017 90  30 - 150 mg/dL Final    HDL 09/07/2017 48  40 - 75 mg/dL Final    LDL Cholesterol 09/07/2017 112.0  63.0 - 159.0 mg/dL Final    HDL/Chol Ratio 09/07/2017 27.0  20.0 - 50.0 % Final    Total Cholesterol/HDL Ratio 09/07/2017 3.7  2.0 - 5.0 Final    Non-HDL Cholesterol 09/07/2017 130  mg/dL Final    WBC 09/06/2017 8.38  3.90 - 12.70 K/uL Final    RBC 09/06/2017 4.32* 4.60 - 6.20 M/uL Final    Hemoglobin 09/06/2017 13.2* 14.0 - 18.0 g/dL Final    Hematocrit 09/06/2017 37.6* 40.0 - 54.0 % Final    MCV 09/06/2017 87  82 - 98 fL Final    MCH 09/06/2017 30.6  27.0 - 31.0 pg Final    MCHC 09/06/2017 35.1  32.0 - 36.0 g/dL Final    RDW 09/06/2017 13.4  11.5 - 14.5 % Final    Platelets 09/06/2017 209  150 - 350 K/uL Final    MPV 09/06/2017 11.8  9.2 - 12.9 fL Final    Gran # 09/06/2017 4.9  1.8 - 7.7 K/uL Final    Lymph # 09/06/2017 2.4  1.0 - 4.8 K/uL Final    Mono # 09/06/2017 0.9  0.3 - 1.0 K/uL Final    Eos # 09/06/2017 0.2  0.0 - 0.5 K/uL Final    Baso # 09/06/2017 0.02  0.00 - 0.20 K/uL Final    Gran% 09/06/2017 58.3  38.0 - 73.0 % Final    Lymph% 09/06/2017 28.5  18.0 - 48.0 % Final    Mono% 09/06/2017 10.9  4.0 - 15.0 % Final    Eosinophil% 09/06/2017 2.1  0.0 - 8.0 % Final    Basophil% 09/06/2017 0.2  0.0 - 1.9 % Final    Differential Method 09/06/2017 Automated   Final      Assessment:       No diagnosis found.    Plan:   Blood pressure 129/58.  Medication reviewed  He thinks he had a Zostavax in the past.  He declined pneumococcal repeat immunization and  flu vac's.      There are no diagnoses linked to this encounter.

## 2017-11-12 ENCOUNTER — OFFICE VISIT (OUTPATIENT)
Dept: URGENT CARE | Facility: CLINIC | Age: 82
End: 2017-11-12
Payer: MEDICARE

## 2017-11-12 ENCOUNTER — HOSPITAL ENCOUNTER (OUTPATIENT)
Dept: RADIOLOGY | Facility: HOSPITAL | Age: 82
Discharge: HOME | End: 2017-11-12
Attending: NURSE PRACTITIONER
Payer: MEDICARE

## 2017-11-12 VITALS
TEMPERATURE: 99 F | HEIGHT: 65 IN | SYSTOLIC BLOOD PRESSURE: 110 MMHG | BODY MASS INDEX: 22.28 KG/M2 | DIASTOLIC BLOOD PRESSURE: 64 MMHG | HEART RATE: 61 BPM | WEIGHT: 133.75 LBS | OXYGEN SATURATION: 99 %

## 2017-11-12 DIAGNOSIS — M25.521 RIGHT ELBOW PAIN: ICD-10-CM
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
## 2017-11-12 DIAGNOSIS — S51.019A SKIN TEAR OF ELBOW WITHOUT COMPLICATION, INITIAL ENCOUNTER: ICD-10-CM

## 2017-11-12 PROCEDURE — 99999 PR PBB SHADOW E&M-EST. PATIENT-LVL IV: CPT | Mod: PBBFAC,,, | Performed by: NURSE PRACTITIONER

## 2017-11-12 PROCEDURE — 73080 X-RAY EXAM OF ELBOW: CPT | Mod: TC,PO,RT

## 2017-11-12 PROCEDURE — 73080 X-RAY EXAM OF ELBOW: CPT | Mod: 26,RT,, | Performed by: RADIOLOGY

## 2017-11-12 PROCEDURE — 99214 OFFICE O/P EST MOD 30 MIN: CPT | Mod: S$PBB,,, | Performed by: NURSE PRACTITIONER

## 2017-11-12 PROCEDURE — 99214 OFFICE O/P EST MOD 30 MIN: CPT | Mod: PBBFAC,25,PO | Performed by: NURSE PRACTITIONER

## 2017-11-12 RX ORDER — MUPIROCIN 20 MG/G
OINTMENT TOPICAL 3 TIMES DAILY
Qty: 1 TUBE | Refills: 0 | Status: SHIPPED | OUTPATIENT
Start: 2017-11-12 | End: 2017-11-22

## 2017-11-12 NOTE — PROGRESS NOTES
"Subjective:      Patient ID: Coy López is a 89 y.o. male.    Chief Complaint: Fall    Mr. López was brought in by his daughter to Urgent Care today with complaints of elbow pain after fall. He was walking outside and bent down to  a newspaper, causing him to fall onto the concrete striking his right elbow. He also has some right shoulder pain. He has skin tears to the elbow. They cleaned the site with peroxide and used a gauze dressing prior to arrival. He denies any weakness or numbness to the arm. He also denies any limitation in movement of the shoulder or elbow. Mr. López has been able to walk with his cane (as per his baseline) since the fall. There was no loss of consciousness and he denies neck or back pain. He had a jacket on that was not ripped. Tetanus is not up to date.      Fall   The accident occurred less than 1 hour ago. The fall occurred while standing. He landed on concrete. The point of impact was the right elbow. The pain is present in the right elbow and right shoulder. The pain is moderate. The symptoms are aggravated by pressure on injury and movement. Pertinent negatives include no bowel incontinence, fever, loss of consciousness, numbness, tingling or vomiting.     Review of Systems   Constitutional: Negative.  Negative for fever.   HENT: Negative.    Respiratory: Negative.    Cardiovascular: Negative.    Gastrointestinal: Negative.  Negative for bowel incontinence and vomiting.   Musculoskeletal: Positive for arthralgias (right shoulder and elbow). Negative for back pain, gait problem, joint swelling, myalgias, neck pain and neck stiffness.   Skin: Positive for wound (right elbow).   Neurological: Negative.  Negative for dizziness, tingling, loss of consciousness, syncope and numbness.       Objective:   /64   Pulse 61   Temp 98.9 °F (37.2 °C)   Ht 5' 5" (1.651 m)   Wt 60.7 kg (133 lb 11.7 oz)   SpO2 99%   BMI 22.25 kg/m²   Physical Exam "   Constitutional: He is oriented to person, place, and time. He appears well-developed and well-nourished. No distress.   HENT:   Head: Normocephalic and atraumatic.   Right Ear: External ear normal.   Left Ear: External ear normal.   Nose: Nose normal.   Eyes: Right eye exhibits no discharge. Left eye exhibits no discharge.   Neck: Normal range of motion. Neck supple.   Cardiovascular: Normal rate and regular rhythm.    Pulses:       Radial pulses are 2+ on the right side, and 2+ on the left side.   Pulmonary/Chest: Effort normal. No respiratory distress.   Musculoskeletal: Normal range of motion.        Right shoulder: He exhibits tenderness (over anterior shoulder at the proximal aspect of the deltoid; tenderness is mild). He exhibits normal range of motion, no bony tenderness, no swelling, no effusion, no crepitus, no deformity, no laceration, no spasm, normal pulse and normal strength.        Right elbow: He exhibits laceration (skin tear x 2). He exhibits normal range of motion, no swelling, no effusion and no deformity. Tenderness found. Olecranon process tenderness noted. No radial head, no medial epicondyle and no lateral epicondyle tenderness noted.        Right wrist: Normal.        Right knee: He exhibits laceration (small superficial abrasion to lateral knee). He exhibits normal range of motion, no swelling, no effusion and no bony tenderness. No tenderness found.        Left knee: Normal.        Cervical back: Normal.        Thoracic back: Normal.        Lumbar back: Normal.        Right hand: Normal.   Neurological: He is alert and oriented to person, place, and time.   Skin: Skin is warm and dry. No rash noted. He is not diaphoretic.   Psychiatric: He has a normal mood and affect. His behavior is normal. Judgment and thought content normal.   Nursing note and vitals reviewed.    Assessment:      1. Fall, initial encounter    2. Right elbow pain    3. Skin tear of elbow without complication, initial  encounter       Plan:   Fall, initial encounter  -     X-Ray Elbow Complete Right; Future; Expected date: 11/12/2017    Right elbow pain  -     X-Ray Elbow Complete Right; Future; Expected date: 11/12/2017    Skin tear of elbow without complication, initial encounter  -     mupirocin (BACTROBAN) 2 % ointment; Apply topically 3 (three) times daily.  Dispense: 1 Tube; Refill: 0    X-ray is negative for fracture or dislocation per [facility] report.   Skin tears were cleaned with betadine and irrigated with sterile saline solution. Antibiotic ointment and a nonadherent gauze dressing were applied.  Reviewed wound care instructions with Mr. López and his daughter.  Monitor for signs of infection and follow up with PCP if any signs of possible infection arise or if pain of the elbow or shoulder doesn't seem to improve with Tylenol, rest, ice, and elevation.   Instructions, follow up, and supportive care as per AVS.

## 2017-11-12 NOTE — PATIENT INSTRUCTIONS
Skin Tear (Skin Avulsion)  A skin avulsion is a tearing of the top layer of skin. This commonly happens after a fall or other injury. It also tends to be more common in older people, or those taking blood thinners or steroids for long periods of time.  Home care  These guidelines will help you care for your wound at home:  · Keep the wound clean and dry for the first 24 to 48 hours, or as your healthcare provider advises.  · If there is a dressing or bandage, change it when it gets wet or dirty. Otherwise, leave it on for the first 24 hours, then change it once a day or as often as the doctor says.  · If stitches or staples were used, check the wound every day.  · After taking off the dressing, wash the area gently with soap and water. Clean as close to the stitches as you can. Avoid washing or rubbing the stitches directly.  · After 3 days you can keep the bandages off the wound, unless told otherwise, or there is continued drainage.  Allow the wound to be open to the air.  · Keep a thin layer of antibiotic ointment on the cut. This will keep the wound clean, make it easier to remove the stitches, and reduce scarring.  · If your wound is oozing, you can put a nonstick dressing over it. Then, reapply the bandage or dressing as you were told.  · You can shower as usual after the first 24 hours, but don't soak the area in water (no baths or swimming) until the stitches or staples are taken out.  · If surgical tape was used, keep the area clean and dry. If it becomes wet, blot it dry with a clean towel.  · If skin glue was used, don't put any creams, lotions, or antibiotic ointments on it. These can dissolve the glue. Usually the glue will flake off in about 5 to 10 days by itself. Try to resist picking it off before that so the wound doesn't open up. When it gets wet, pat it dry.  Here is some information about medicine:  · You may use over-the-counter medicine such as acetaminophen or ibuprofen to control pain,  unless another pain medicine was given. If you have chronic liver or kidney disease or ever had a stomach ulcer or gastrointestinal bleeding, talk with your doctor before using these medicines.  · If you were given antibiotics, take them until they are all used up. It is important to finish the antibiotics even if the wound looks better. This will ensure that the infection has cleared.  Follow-up care  Follow up with your healthcare provider, or as advised.  · Watch for any signs of infection, such as increasing redness, swelling, or pus coming out. If this happens, don't wait for your scheduled visit. Instead, see a doctor sooner.  · Stitches or staples are usually taken out within 5 to 14 days. This varies depending on what part of your body they are on, and the type of wound. The doctor will tell you how long stitches should be left in.   · If surgical tape was used, it is usually left on for 7 to 10 days. You can remove surgical tape after that unless you were told otherwise. If you try to remove it, and it is too hard, soaking can help. Surgical tape strips will eventually fall off on their own. If the edges of the cut pull apart, stop removing the tape or strips and follow up with your doctor  · As mentioned above, skin glue will flake off by itself in 5 to 10 days, so you don't need to pull it off.  If any X-rays were done, you will be notified of any changes that may affect your care.  When to seek medical advice  Call your healthcare provider right away if any of these occur:  · Increasing pain in the wound  · Redness, swelling, or pus coming from the wound  · Fever of 100.4ºF (38ºC) or higher, or as directed by your healthcare provider  · Sutures or staples come apart or fall out before your next appointment and the wound edges look as if they will re-open  · Surgical tape closures fall off before 7 days, and the wound edges look as if they will re-open  · Bleeding not controlled by direct pressure  Date  Last Reviewed: 9/1/2016  © 6247-2192 [employer]. [address] [place] [address], [place] [address]. All rights reserved. This information is not intended as a substitute for professional medical care. Always follow your healthcare professional's instructions.

## 2017-11-13 ENCOUNTER — PATIENT MESSAGE (OUTPATIENT)
Dept: INTERNAL MEDICINE | Facility: CLINIC | Age: 82
End: 2017-11-13

## 2017-11-15 ENCOUNTER — TELEPHONE (OUTPATIENT)
Dept: INTERNAL MEDICINE | Facility: CLINIC | Age: 82
End: 2017-11-15

## 2017-11-15 DIAGNOSIS — R29.6 FALLING: Primary | ICD-10-CM

## 2017-11-15 NOTE — TELEPHONE ENCOUNTER
----- Message from Stevenson Alexander sent at 11/15/2017 10:44 AM CST -----  Contact: Vikram ( [facility] )   Vikram ( [facility] ) is requesting a call from nurse to review referral for some questions.        Please call Vikram ( [facility] ) back at [phone]

## 2017-11-20 ENCOUNTER — TELEPHONE (OUTPATIENT)
Dept: INTERNAL MEDICINE | Facility: CLINIC | Age: 82
End: 2017-11-20

## 2017-11-20 NOTE — TELEPHONE ENCOUNTER
----- Message from Traci Jimenez sent at 11/17/2017  3:12 PM CST -----  Contact: [facility]  Request a call concerning a plan of care for this pt, can be reached at [phone]///[id number]

## 2017-11-21 ENCOUNTER — TELEPHONE (OUTPATIENT)
Dept: INTERNAL MEDICINE | Facility: CLINIC | Age: 82
End: 2017-11-21

## 2017-11-21 ENCOUNTER — NURSE TRIAGE (OUTPATIENT)
Dept: ADMINISTRATIVE | Facility: CLINIC | Age: 82
End: 2017-11-21

## 2017-11-21 ENCOUNTER — OFFICE VISIT (OUTPATIENT)
Dept: URGENT CARE | Facility: CLINIC | Age: 82
End: 2017-11-21
Payer: MEDICARE

## 2017-11-21 VITALS
TEMPERATURE: 97 F | HEART RATE: 60 BPM | HEIGHT: 65 IN | BODY MASS INDEX: 22.94 KG/M2 | OXYGEN SATURATION: 99 % | SYSTOLIC BLOOD PRESSURE: 152 MMHG | DIASTOLIC BLOOD PRESSURE: 74 MMHG | WEIGHT: 137.69 LBS

## 2017-11-21 DIAGNOSIS — I10 ESSENTIAL HYPERTENSION: Primary | ICD-10-CM

## 2017-11-21 PROCEDURE — 99999 PR PBB SHADOW E&M-EST. PATIENT-LVL III: CPT | Mod: PBBFAC,,, | Performed by: NURSE PRACTITIONER

## 2017-11-21 PROCEDURE — 99213 OFFICE O/P EST LOW 20 MIN: CPT | Mod: PBBFAC,PO | Performed by: NURSE PRACTITIONER

## 2017-11-21 PROCEDURE — 99213 OFFICE O/P EST LOW 20 MIN: CPT | Mod: S$PBB,,, | Performed by: NURSE PRACTITIONER

## 2017-11-21 NOTE — TELEPHONE ENCOUNTER
----- Message from Traci Jimenez sent at 11/21/2017  9:15 AM CST -----  Contact: Joe BOYD Nurse  States the pt resting b/p is 186/66 and wants to be advised, request a call at [phone]///thxW

## 2017-11-21 NOTE — TELEPHONE ENCOUNTER
Took at home 226/87 , is taking him to urgent care please advise   Appointment has been made for 11/29/2017

## 2017-11-21 NOTE — TELEPHONE ENCOUNTER
Reason for Disposition   BP > 160/100    Protocols used: ST HIGH BLOOD PRESSURE-A-OH    Spoke with [facility] physical therapist, Mr. López's blood pressure was 180/66. He states patient is asymptomatic and he did take morning medication.

## 2017-11-21 NOTE — PATIENT INSTRUCTIONS
PLAN:   Advise increase p.o. fluids-- water/juice & rest  Advise monitor BP  Advise low salt diet  Practice good handwashing.  Tylenol for fever, headache and body aches.  Advise follow up with PCP.  Advise go to ER if symptoms worsen or fail to improve with treatment.  [facility]/ [facility]    DIAGNOSIS:           Low-Salt Diet  This diet removes foods that are high in salt. It also limits the amount of salt you use when cooking. It is most often used for people with high blood pressure, edema (fluid retention), and kidney, liver, or heart disease.  Table salt contains the mineral sodium. Your body needs sodium to work normally. But too much sodium can make your health problems worse. Your healthcare provider is recommending a low-salt (also called low-sodium) diet for you. Your total daily allowance of salt is 1,500 to 2,300 milligrams (mg). It is less than 1 teaspoon of table salt. This means you can have only about 500 to 700 mg of sodium at each meal. People with certain health problems should limit salt intake to the lower end of the recommended range.    When you cook, dont add much salt. If you can cook without using salt, even better. Dont add salt to your food at the table.  When shopping, read food labels. Salt is often called sodium on the label. Choose foods that are salt-free, low salt, or very low salt. Note that foods with reduced salt may not lower your salt intake enough.    Beans, potatoes, and pasta  Ok: Dry beans, split peas, lentils, potatoes, rice, macaroni, pasta, spaghetti without added salt  Avoid: Potato chips, tortilla chips, and similar products  Breads and cereals  Ok: Low-sodium breads, rolls, cereals, and cakes; low-salt crackers, matzo crackers  Avoid: Salted crackers, pretzels, popcorn, Irish toast, pancakes, muffins  Dairy  Ok: Milk, chocolate milk, hot chocolate mix, low-salt cheeses, and yogurt  Avoid: Processed cheese and cheese spreads; Roquefort, Camembert, and  cottage cheese; buttermilk, instant breakfast drink  Desserts  Ok: Ice cream, frozen yogurt, juice bars, gelatin, cookies and pies, sugar, honey, jelly, hard candy  Avoid: Most pies, cakes and cookies prepared or processed with salt; instant pudding  Drinks  Ok: Tea, coffee, fizzy (carbonated) drinks, juices  Avoid: Flavored coffees, electrolyte replacement drinks, sports drinks  Meats  Ok: All fresh meat, fish, poultry, low-salt tuna, eggs, egg substitute  Avoid: Smoked, pickled, brine-cured, or salted meats and fish. This includes novak, chipped beef, corned beef, hot dogs, deli meats, ham, kosher meats, salt pork, sausage, canned tuna, salted codfish, smoked salmon, herring, sardines, or anchovies.  Seasonings and spices  Ok: Most seasonings are okay. Good substitutes for salt include: fresh herb blends, hot sauce, lemon, garlic, lópez, vinegar, dry mustard, parsley, cilantro, horseradish, tomato paste, regular margarine, mayonnaise, unsalted butter, cream cheese, vegetable oil, cream, low-salt salad dressing and gravy.  Avoid: Regular ketchup, relishes, pickles, soy sauce, teriyaki sauce, Worcestershire sauce, BBQ sauce, tartar sauce, meat tenderizer, chili sauce, regular gravy, regular salad dressing, salted butter  Soups  Ok: Low-salt soups and broths made with allowed foods  Avoid: Bouillon cubes, soups with smoked or salted meats, regular soup and broth  Vegetables  Ok: Most vegetables are okay; also low-salt tomato and vegetable juices  Avoid: Sauerkraut and other brine-soaked vegetables; pickles and other pickled vegetables; tomato juice, olives  Date Last Reviewed: 8/1/2016  © 6468-2791 [employer]. [address] [place] [address]. All rights reserved. This information is not intended as a substitute for professional medical care. Always follow your healthcare professional's instructions.

## 2017-11-21 NOTE — PROGRESS NOTES
CHIEF COMPLAINT/REASON FOR VISIT:  Elevated BP       HISTORY OF PRESENT ILLNESS:  89 -year-old  Male with daughter complains of elevated BP onset this morning. Patient admits became upset due to wife's sitter came to work with a cold. Daughter admits parents had left over Thanksgiving Sunday & Monday. Admits everyone brought food! Discussed low sodium diet. Discussed sitter involving family when Ill. Discussed sitter options. Patient denies shortness of breath, congestion, fever, cough, back pain and urinary discomfort. Daughter admits concerned due to BP earlier was 212/ 80 & 226/87! Admits just wanted to be checked.      Past Medical History:   Diagnosis Date    Bladder cancer 2009    Coronary artery disease     Degenerative arthritis of lumbar spine     Hyperlipidemia     Hypertension     LBP (low back pain)     Prostate CA 1994    Renal failure 2006    Right leg pain     TIA (transient ischemic attack) 1980          Social History     Social History    Marital status:      Spouse name: N/A    Number of children: 5    Years of education: N/A     Occupational History    Retired      Social History Main Topics    Smoking status: Former Smoker     Years: 10.00     Quit date: 2/12/1961    Smokeless tobacco: Never Used    Alcohol use No    Drug use: No    Sexual activity: Not Currently     Other Topics Concern    Not on file     Social History Narrative    No narrative on file          Family History   Problem Relation Age of Onset    Kidney failure Mother      POST OP GB    Stroke Father     Diabetes Sister     Hypertension Brother     Heart attack Brother     Nephrolithiasis Son        ROS:  GENERAL: No fever, chills, fatigability or weight loss.  SKIN: No rashes, itching or changes in color or texture of skin.  HEENT: No headaches or recent head trauma. Denies ear pain, discharge or vertigo. No loss of smell, no epistaxis or postnasal drip. No hoarseness or change in voice.    NODES: No masses or lesions. Denies swollen glands.  CHEST: Denies cyanosis, wheezing, cough and sputum production.  CARDIOVASCULAR: Denies chest pain, PND, orthopnea or reduced exercise tolerance.  ABDOMEN: Appetite fine. No weight loss. Denies diarrhea, abdominal pain, hematemesis or blood in stool.  MUSCULOSKELETAL: No joint stiffness or swelling. Denies back pain.  NEUROLOGIC: No history of seizures, paralysis, alteration of gait or coordination.  PSYCHIATRIC: Denies mood swings, depression or suicidal thoughts.    PE:   APPEARANCE: Well nourished, well developed, in no acute distress.   V/S: Reviewed.  SKIN: Normal skin turgor, no lesions.  HEENT: Normocephalic, atraumatic, turbinates pink, pink pharynx, TM's clear bilateral.  CHEST: Lungs clear to auscultation.  CARDIOVASCULAR: Regular rate and rhythm   MUSCULOSKELETAL: Motor: 5/5 strength major flexors/extensors.  NEUROLOGIC: No sensory deficits. Gait & Posture: ambulates with a cane. No cerebellar signs.  MENTAL STATUS: Patient alert, oriented x 3 & conversant.    PLAN:   Advise increase p.o. fluids-- water/juice & rest  Advise monitor BP  Advise low salt diet  Practice good handwashing.  Tylenol for fever, headache and body aches.  Advise follow up with PCP.  Advise go to ER if symptoms worsen or fail to improve with treatment.  [facility]/ [facility]    DIAGNOSIS:   Hypertension  Coronary artery disease  History of TIA

## 2017-11-24 ENCOUNTER — TELEPHONE (OUTPATIENT)
Dept: INTERNAL MEDICINE | Facility: CLINIC | Age: 82
End: 2017-11-24

## 2017-11-24 NOTE — TELEPHONE ENCOUNTER
----- Message from Candace Turner sent at 11/24/2017 10:34 AM CST -----  Contact: Ross/physical therapy   Call caller regarding pt blood pressure being so high. Caller states pt went to urgent care earlier this week and blood pressure is still high and need to know what to do.   [phone]

## 2017-11-24 NOTE — TELEPHONE ENCOUNTER
Called and spoke with Joe with PT.  Patient blood pressure is a little high again today 170/70.  The patient does admit that his shoulder is hurting today.  Joe did inform the family if he had any other symptoms to go to the urgent care or ED

## 2017-11-29 ENCOUNTER — OFFICE VISIT (OUTPATIENT)
Dept: INTERNAL MEDICINE | Facility: CLINIC | Age: 82
End: 2017-11-29
Payer: MEDICARE

## 2017-11-29 VITALS
BODY MASS INDEX: 22.95 KG/M2 | DIASTOLIC BLOOD PRESSURE: 70 MMHG | OXYGEN SATURATION: 99 % | WEIGHT: 137.88 LBS | SYSTOLIC BLOOD PRESSURE: 164 MMHG | TEMPERATURE: 97 F | HEART RATE: 59 BPM

## 2017-11-29 DIAGNOSIS — M25.511 ACUTE PAIN OF RIGHT SHOULDER: ICD-10-CM
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
## 2017-11-29 DIAGNOSIS — L21.9 SEBORRHEA: ICD-10-CM

## 2017-11-29 DIAGNOSIS — G45.9 TRANSIENT CEREBRAL ISCHEMIA, UNSPECIFIED TYPE: ICD-10-CM

## 2017-11-29 DIAGNOSIS — I10 ESSENTIAL HYPERTENSION: Primary | ICD-10-CM

## 2017-11-29 DIAGNOSIS — R03.0 ELEVATED BLOOD PRESSURE READING: ICD-10-CM

## 2017-11-29 DIAGNOSIS — T14.90XA TRAUMA: ICD-10-CM

## 2017-11-29 PROCEDURE — 99213 OFFICE O/P EST LOW 20 MIN: CPT | Mod: PBBFAC,PO | Performed by: FAMILY MEDICINE

## 2017-11-29 PROCEDURE — 99999 PR PBB SHADOW E&M-EST. PATIENT-LVL III: CPT | Mod: PBBFAC,,, | Performed by: FAMILY MEDICINE

## 2017-11-29 PROCEDURE — 99214 OFFICE O/P EST MOD 30 MIN: CPT | Mod: S$PBB,,, | Performed by: FAMILY MEDICINE

## 2017-11-29 RX ORDER — KETOCONAZOLE 20 MG/G
CREAM TOPICAL DAILY
Qty: 30 G | Refills: 1 | Status: SHIPPED | OUTPATIENT
Start: 2017-11-29 | End: 2019-02-26 | Stop reason: SDUPTHER

## 2017-11-29 RX ORDER — BENAZEPRIL HYDROCHLORIDE 20 MG/1
20 TABLET ORAL DAILY
Qty: 90 TABLET | Refills: 3 | Status: SHIPPED | OUTPATIENT
Start: 2017-11-29 | End: 2018-01-24

## 2017-11-29 NOTE — PROGRESS NOTES
Subjective:       Patient ID: Coy López is a 89 y.o. male.    Chief Complaint: Hypertension; Fall; and Skin issues    Follow-up hypertension and elevated blood pressure.  Blood pressures at home taken by home health nurses have been 209/82, 193/77, 183/67.  He has past history of TIA.  He denies any headache chest pain palpitations or shortness of breath or edema.  He also had a recent fall.  He will use his walker did not like to we'll also Kenalog rolled away.  Emergency room evaluation was done.  No fractures were present.  Abrasion of his right elbow is healing.  He reports some continued discomfort in the right shoulder.  Also recently developed a rash in the temple  area , post auricles area and  in the brow area.      Hypertension   Pertinent negatives include no chest pain, headaches, palpitations or shortness of breath.   Fall   Pertinent negatives include no abdominal pain, fever or headaches.     Review of Systems   Constitutional: Negative for appetite change, chills, fatigue, fever and unexpected weight change.   Respiratory: Negative for cough, chest tightness, shortness of breath and wheezing.    Cardiovascular: Negative for chest pain, palpitations and leg swelling.        Denies lightheadedness or syncope   Gastrointestinal: Negative for abdominal pain.   Genitourinary: Negative for difficulty urinating.   Musculoskeletal:        Regards his fall no right elbow pain but some right shoulder pain.   Skin:        Healing abrasion of the above   Neurological: Negative for syncope, facial asymmetry, speech difficulty, weakness, light-headedness and headaches.       Objective:      Physical Exam   Constitutional: He is oriented to person, place, and time. He appears well-developed and well-nourished. No distress.   Neck: Neck supple. No JVD present. No thyromegaly present.   Cardiovascular: Normal rate, regular rhythm and normal heart sounds.    No murmur heard.  Pulmonary/Chest: Effort normal  and breath sounds normal. No respiratory distress. He has no wheezes.   Abdominal: Soft. Bowel sounds are normal. He exhibits no mass. There is no tenderness.   Musculoskeletal:   No elbow tenderness with full range of motion.  No direct shoulder tenderness.  Full range of motion of the shoulder with some mild discomfort.  No deformity present   Lymphadenopathy:     He has no cervical adenopathy.   Neurological: He is alert and oriented to person, place, and time.   Skin: He is not diaphoretic.   Healed abrasion on the right elbow    seborrheic dermatitis with mild redness and scaling in the temple region behind both ears and in the brow region       Office Visit on 09/06/2017   Component Date Value Ref Range Status    Sodium 09/07/2017 143  136 - 145 mmol/L Final    Potassium 09/07/2017 5.0  3.5 - 5.1 mmol/L Final    Chloride 09/07/2017 109  95 - 110 mmol/L Final    CO2 09/07/2017 22* 23 - 29 mmol/L Final    Glucose 09/07/2017 83  70 - 110 mg/dL Final    BUN, Bld 09/07/2017 24  10 - 30 mg/dL Final    Creatinine 09/07/2017 1.1  0.5 - 1.4 mg/dL Final    Calcium 09/07/2017 9.5  8.7 - 10.5 mg/dL Final    Total Protein 09/07/2017 7.7  6.0 - 8.4 g/dL Final    Albumin 09/07/2017 3.7  3.5 - 5.2 g/dL Final    Total Bilirubin 09/07/2017 0.8  0.1 - 1.0 mg/dL Final    Alkaline Phosphatase 09/07/2017 59  55 - 135 U/L Final    AST 09/07/2017 32  10 - 40 U/L Final    ALT 09/07/2017 16  10 - 44 U/L Final    Anion Gap 09/07/2017 12  8 - 16 mmol/L Final    eGFR if African American 09/07/2017 >60.0  >60 mL/min/1.73 m^2 Final    eGFR if non African American 09/07/2017 58.0* >60 mL/min/1.73 m^2 Final    Cholesterol 09/07/2017 178  120 - 199 mg/dL Final    Triglycerides 09/07/2017 90  30 - 150 mg/dL Final    HDL 09/07/2017 48  40 - 75 mg/dL Final    LDL Cholesterol 09/07/2017 112.0  63.0 - 159.0 mg/dL Final    HDL/Chol Ratio 09/07/2017 27.0  20.0 - 50.0 % Final    Total Cholesterol/HDL Ratio 09/07/2017 3.7  2.0 -  5.0 Final    Non-HDL Cholesterol 09/07/2017 130  mg/dL Final    WBC 09/06/2017 8.38  3.90 - 12.70 K/uL Final    RBC 09/06/2017 4.32* 4.60 - 6.20 M/uL Final    Hemoglobin 09/06/2017 13.2* 14.0 - 18.0 g/dL Final    Hematocrit 09/06/2017 37.6* 40.0 - 54.0 % Final    MCV 09/06/2017 87  82 - 98 fL Final    MCH 09/06/2017 30.6  27.0 - 31.0 pg Final    MCHC 09/06/2017 35.1  32.0 - 36.0 g/dL Final    RDW 09/06/2017 13.4  11.5 - 14.5 % Final    Platelets 09/06/2017 209  150 - 350 K/uL Final    MPV 09/06/2017 11.8  9.2 - 12.9 fL Final    Gran # 09/06/2017 4.9  1.8 - 7.7 K/uL Final    Lymph # 09/06/2017 2.4  1.0 - 4.8 K/uL Final    Mono # 09/06/2017 0.9  0.3 - 1.0 K/uL Final    Eos # 09/06/2017 0.2  0.0 - 0.5 K/uL Final    Baso # 09/06/2017 0.02  0.00 - 0.20 K/uL Final    Gran% 09/06/2017 58.3  38.0 - 73.0 % Final    Lymph% 09/06/2017 28.5  18.0 - 48.0 % Final    Mono% 09/06/2017 10.9  4.0 - 15.0 % Final    Eosinophil% 09/06/2017 2.1  0.0 - 8.0 % Final    Basophil% 09/06/2017 0.2  0.0 - 1.9 % Final    Differential Method 09/06/2017 Automated   Final     Assessment:       1. Essential hypertension    2. Seborrhea        Plan:     Continue benazepril HCTZ and add additional benazepril 20 mg a day.  Continue home health follow-up with blood pressure monitoring.  Heat  range of motion to right shoulder.  Return in 2 weeks reevaluate shoulder pain as well as blood pressure.  Nizoral cream for seborrheic dermatitis.  Physical therapy evaluation for balance and weakness was done.  He was told that balance was good  for his age.  He is walking using a bike to strengthen his legs.    Essential hypertension  -     benazepril (LOTENSIN) 20 MG tablet; Take 1 tablet (20 mg total) by mouth once daily.  Dispense: 90 tablet; Refill: 3    Seborrhea  -     ketoconazole (NIZORAL) 2 % cream; Apply topically once daily.  Dispense: 30 g; Refill: 1

## 2017-12-11 ENCOUNTER — TELEPHONE (OUTPATIENT)
Dept: INTERNAL MEDICINE | Facility: CLINIC | Age: 82
End: 2017-12-11

## 2017-12-11 NOTE — TELEPHONE ENCOUNTER
----- Message from Andie Rodríguez sent at 12/11/2017  9:42 AM CST -----  Contact: Marc Hamm [facility] in [place] at Home Physical Therapy  Marc is calling in regards to pt not being able to recieve Physical therapy due to blood pressure issues. Please contact Marc at [phone].

## 2017-12-13 ENCOUNTER — OFFICE VISIT (OUTPATIENT)
Dept: INTERNAL MEDICINE | Facility: CLINIC | Age: 82
End: 2017-12-13
Payer: MEDICARE

## 2017-12-13 VITALS
WEIGHT: 137.88 LBS | SYSTOLIC BLOOD PRESSURE: 140 MMHG | DIASTOLIC BLOOD PRESSURE: 70 MMHG | TEMPERATURE: 97 F | BODY MASS INDEX: 22.95 KG/M2 | HEART RATE: 58 BPM | OXYGEN SATURATION: 99 %

## 2017-12-13 DIAGNOSIS — I10 ESSENTIAL HYPERTENSION: Primary | ICD-10-CM
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
## 2017-12-13 DIAGNOSIS — R03.0 ELEVATED BLOOD PRESSURE READING: ICD-10-CM

## 2017-12-13 PROCEDURE — 99999 PR PBB SHADOW E&M-EST. PATIENT-LVL III: CPT | Mod: PBBFAC,,, | Performed by: FAMILY MEDICINE

## 2017-12-13 PROCEDURE — 99213 OFFICE O/P EST LOW 20 MIN: CPT | Mod: S$PBB,,, | Performed by: FAMILY MEDICINE

## 2017-12-13 PROCEDURE — 99213 OFFICE O/P EST LOW 20 MIN: CPT | Mod: PBBFAC,PO | Performed by: FAMILY MEDICINE

## 2017-12-13 NOTE — PROGRESS NOTES
Subjective:       Patient ID: Coy López is a 89 y.o. male.    Chief Complaint: BP medication increase follow up    Follow-up hypertension right shoulder pain.  He denies headache chest pain and palpitations shortness of breath or edema.  Right shoulder pain is improved.  He has full range of motion.      Review of Systems   Constitutional: Negative for appetite change, fatigue and unexpected weight change.   Respiratory: Negative for cough, shortness of breath and wheezing.    Cardiovascular: Negative for chest pain, palpitations and leg swelling.   Gastrointestinal: Negative for abdominal pain.   Genitourinary: Negative for difficulty urinating.   Neurological: Negative for headaches.       Objective:      Physical Exam   Constitutional: He is oriented to person, place, and time. He appears well-developed and well-nourished. No distress.   Neck: Neck supple. No thyromegaly present.   Cardiovascular: Normal rate, regular rhythm and normal heart sounds.    No murmur heard.  Pulmonary/Chest: Effort normal and breath sounds normal. No respiratory distress. He has no wheezes.   Abdominal: Soft. Bowel sounds are normal. He exhibits no mass. There is no tenderness.   Musculoskeletal:   Mild discomfort with lifting  his right shoulder.  No deformity.  Range of motion is normal   Lymphadenopathy:     He has no cervical adenopathy.   Neurological: He is alert and oriented to person, place, and time.       Office Visit on 09/06/2017   Component Date Value Ref Range Status    Sodium 09/07/2017 143  136 - 145 mmol/L Final    Potassium 09/07/2017 5.0  3.5 - 5.1 mmol/L Final    Chloride 09/07/2017 109  95 - 110 mmol/L Final    CO2 09/07/2017 22* 23 - 29 mmol/L Final    Glucose 09/07/2017 83  70 - 110 mg/dL Final    BUN, Bld 09/07/2017 24  10 - 30 mg/dL Final    Creatinine 09/07/2017 1.1  0.5 - 1.4 mg/dL Final    Calcium 09/07/2017 9.5  8.7 - 10.5 mg/dL Final    Total Protein 09/07/2017 7.7  6.0 - 8.4 g/dL  Final    Albumin 09/07/2017 3.7  3.5 - 5.2 g/dL Final    Total Bilirubin 09/07/2017 0.8  0.1 - 1.0 mg/dL Final    Alkaline Phosphatase 09/07/2017 59  55 - 135 U/L Final    AST 09/07/2017 32  10 - 40 U/L Final    ALT 09/07/2017 16  10 - 44 U/L Final    Anion Gap 09/07/2017 12  8 - 16 mmol/L Final    eGFR if African American 09/07/2017 >60.0  >60 mL/min/1.73 m^2 Final    eGFR if non African American 09/07/2017 58.0* >60 mL/min/1.73 m^2 Final    Cholesterol 09/07/2017 178  120 - 199 mg/dL Final    Triglycerides 09/07/2017 90  30 - 150 mg/dL Final    HDL 09/07/2017 48  40 - 75 mg/dL Final    LDL Cholesterol 09/07/2017 112.0  63.0 - 159.0 mg/dL Final    HDL/Chol Ratio 09/07/2017 27.0  20.0 - 50.0 % Final    Total Cholesterol/HDL Ratio 09/07/2017 3.7  2.0 - 5.0 Final    Non-HDL Cholesterol 09/07/2017 130  mg/dL Final    WBC 09/06/2017 8.38  3.90 - 12.70 K/uL Final    RBC 09/06/2017 4.32* 4.60 - 6.20 M/uL Final    Hemoglobin 09/06/2017 13.2* 14.0 - 18.0 g/dL Final    Hematocrit 09/06/2017 37.6* 40.0 - 54.0 % Final    MCV 09/06/2017 87  82 - 98 fL Final    MCH 09/06/2017 30.6  27.0 - 31.0 pg Final    MCHC 09/06/2017 35.1  32.0 - 36.0 g/dL Final    RDW 09/06/2017 13.4  11.5 - 14.5 % Final    Platelets 09/06/2017 209  150 - 350 K/uL Final    MPV 09/06/2017 11.8  9.2 - 12.9 fL Final    Gran # 09/06/2017 4.9  1.8 - 7.7 K/uL Final    Lymph # 09/06/2017 2.4  1.0 - 4.8 K/uL Final    Mono # 09/06/2017 0.9  0.3 - 1.0 K/uL Final    Eos # 09/06/2017 0.2  0.0 - 0.5 K/uL Final    Baso # 09/06/2017 0.02  0.00 - 0.20 K/uL Final    Gran% 09/06/2017 58.3  38.0 - 73.0 % Final    Lymph% 09/06/2017 28.5  18.0 - 48.0 % Final    Mono% 09/06/2017 10.9  4.0 - 15.0 % Final    Eosinophil% 09/06/2017 2.1  0.0 - 8.0 % Final    Basophil% 09/06/2017 0.2  0.0 - 1.9 % Final    Differential Method 09/06/2017 Automated   Final     Assessment:       No diagnosis found.    Plan:   Blood pressure recheck by me 140/70.   Continue current medications.  Continue heat range of motion to right shoulder.  Follow-up in one month and return with home blood pressure monitor.      There are no diagnoses linked to this encounter.

## 2017-12-19 ENCOUNTER — TELEPHONE (OUTPATIENT)
Dept: INTERNAL MEDICINE | Facility: CLINIC | Age: 82
End: 2017-12-19

## 2017-12-19 NOTE — TELEPHONE ENCOUNTER
----- Message from Maggie Lacy sent at 12/19/2017 11:36 AM CST -----  Contact: [facility]   [facility] requested to be called in regards to pt above...[phone]

## 2017-12-21 ENCOUNTER — TELEPHONE (OUTPATIENT)
Dept: INTERNAL MEDICINE | Facility: CLINIC | Age: 82
End: 2017-12-21

## 2017-12-21 NOTE — TELEPHONE ENCOUNTER
----- Message from Samia Prakash sent at 12/21/2017 10:35 AM CST -----  Contact: [facility] [phone] Trevon Lui is refusing home health and he spoke with the daughter and everyone is stressing him out,,,, please call to discuss

## 2017-12-21 NOTE — TELEPHONE ENCOUNTER
Pt daughter is stating he is refusing HH from all the nurses coming to see him and daughter states that it would be best to D/C from HH. Please place order to D/C.

## 2017-12-29 ENCOUNTER — TELEPHONE (OUTPATIENT)
Dept: INTERNAL MEDICINE | Facility: CLINIC | Age: 82
End: 2017-12-29

## 2017-12-29 NOTE — TELEPHONE ENCOUNTER
----- Message from Joy Harris sent at 12/29/2017 11:43 AM CST -----  Contact: Debi BOYD  He's calling to get authorization to discharge pt, needs to complete order,  please advise [phone]

## 2017-12-29 NOTE — TELEPHONE ENCOUNTER
Informed that it is okay to D/C from  due to refusing care. Will fax orders over to be signed. Informed it will be Tuesday before he gets them back. Verbalized understanding.

## 2018-01-24 ENCOUNTER — OFFICE VISIT (OUTPATIENT)
Dept: INTERNAL MEDICINE | Facility: CLINIC | Age: 83
End: 2018-01-24
Payer: MEDICARE

## 2018-01-24 VITALS
BODY MASS INDEX: 22.72 KG/M2 | DIASTOLIC BLOOD PRESSURE: 64 MMHG | TEMPERATURE: 98 F | HEIGHT: 65 IN | WEIGHT: 136.38 LBS | OXYGEN SATURATION: 99 % | HEART RATE: 57 BPM | SYSTOLIC BLOOD PRESSURE: 160 MMHG

## 2018-01-24 DIAGNOSIS — G45.8 OTHER SPECIFIED TRANSIENT CEREBRAL ISCHEMIAS: ICD-10-CM

## 2018-01-24 DIAGNOSIS — I10 ESSENTIAL HYPERTENSION: Primary | ICD-10-CM

## 2018-01-24 DIAGNOSIS — R03.0 ELEVATED BLOOD PRESSURE READING: ICD-10-CM

## 2018-01-24 PROCEDURE — 99213 OFFICE O/P EST LOW 20 MIN: CPT | Mod: PBBFAC,PO | Performed by: FAMILY MEDICINE

## 2018-01-24 PROCEDURE — 99999 PR PBB SHADOW E&M-EST. PATIENT-LVL III: CPT | Mod: PBBFAC,,, | Performed by: FAMILY MEDICINE

## 2018-01-24 PROCEDURE — 99213 OFFICE O/P EST LOW 20 MIN: CPT | Mod: S$PBB,,, | Performed by: FAMILY MEDICINE

## 2018-01-24 RX ORDER — BENAZEPRIL HYDROCHLORIDE AND HYDROCHLOROTHIAZIDE 20; 12.5 MG/1; MG/1
2 TABLET ORAL DAILY
Qty: 180 TABLET | Refills: 3 | Status: SHIPPED | OUTPATIENT
Start: 2018-01-24 | End: 2018-02-22 | Stop reason: SDUPTHER

## 2018-01-24 NOTE — PROGRESS NOTES
Subjective:       Patient ID: Coy López is a 89 y.o. male.    Chief Complaint: Follow-up    Follow-up hypertension and TIA.  Home blood pressures been running elevated.  Denies any headache speech difficulty weakness chest pain palpitations or edema.      Review of Systems   Constitutional: Negative for appetite change, fatigue and unexpected weight change.   Respiratory: Negative for cough, shortness of breath and wheezing.    Cardiovascular: Negative for chest pain, palpitations and leg swelling.   Gastrointestinal: Negative for abdominal pain.   Genitourinary: Negative for difficulty urinating.   Neurological: Negative for headaches.       Objective:      Physical Exam   Constitutional: He is oriented to person, place, and time. He appears well-developed and well-nourished. No distress.   Neck: Neck supple. No thyromegaly present.   Cardiovascular: Normal rate, regular rhythm and normal heart sounds.    No murmur heard.  Pulmonary/Chest: Effort normal and breath sounds normal. No respiratory distress. He has no wheezes.   Abdominal: Soft. Bowel sounds are normal. He exhibits no mass. There is no tenderness.   Lymphadenopathy:     He has no cervical adenopathy.   Neurological: He is alert and oriented to person, place, and time.       Office Visit on 09/06/2017   Component Date Value Ref Range Status    Sodium 09/06/2017 143  136 - 145 mmol/L Final    Potassium 09/06/2017 5.0  3.5 - 5.1 mmol/L Final    Chloride 09/06/2017 109  95 - 110 mmol/L Final    CO2 09/06/2017 22* 23 - 29 mmol/L Final    Glucose 09/06/2017 83  70 - 110 mg/dL Final    BUN, Bld 09/06/2017 24  10 - 30 mg/dL Final    Creatinine 09/06/2017 1.1  0.5 - 1.4 mg/dL Final    Calcium 09/06/2017 9.5  8.7 - 10.5 mg/dL Final    Total Protein 09/06/2017 7.7  6.0 - 8.4 g/dL Final    Albumin 09/06/2017 3.7  3.5 - 5.2 g/dL Final    Total Bilirubin 09/06/2017 0.8  0.1 - 1.0 mg/dL Final    Alkaline Phosphatase 09/06/2017 59  55 - 135 U/L  Final    AST 09/06/2017 32  10 - 40 U/L Final    ALT 09/06/2017 16  10 - 44 U/L Final    Anion Gap 09/06/2017 12  8 - 16 mmol/L Final    eGFR if African American 09/06/2017 >60.0  >60 mL/min/1.73 m^2 Final    eGFR if non African American 09/06/2017 58.0* >60 mL/min/1.73 m^2 Final    Cholesterol 09/06/2017 178  120 - 199 mg/dL Final    Triglycerides 09/06/2017 90  30 - 150 mg/dL Final    HDL 09/06/2017 48  40 - 75 mg/dL Final    LDL Cholesterol 09/06/2017 112.0  63.0 - 159.0 mg/dL Final    HDL/Chol Ratio 09/06/2017 27.0  20.0 - 50.0 % Final    Total Cholesterol/HDL Ratio 09/06/2017 3.7  2.0 - 5.0 Final    Non-HDL Cholesterol 09/06/2017 130  mg/dL Final    WBC 09/06/2017 8.38  3.90 - 12.70 K/uL Final    RBC 09/06/2017 4.32* 4.60 - 6.20 M/uL Final    Hemoglobin 09/06/2017 13.2* 14.0 - 18.0 g/dL Final    Hematocrit 09/06/2017 37.6* 40.0 - 54.0 % Final    MCV 09/06/2017 87  82 - 98 fL Final    MCH 09/06/2017 30.6  27.0 - 31.0 pg Final    MCHC 09/06/2017 35.1  32.0 - 36.0 g/dL Final    RDW 09/06/2017 13.4  11.5 - 14.5 % Final    Platelets 09/06/2017 209  150 - 350 K/uL Final    MPV 09/06/2017 11.8  9.2 - 12.9 fL Final    Gran # 09/06/2017 4.9  1.8 - 7.7 K/uL Final    Lymph # 09/06/2017 2.4  1.0 - 4.8 K/uL Final    Mono # 09/06/2017 0.9  0.3 - 1.0 K/uL Final    Eos # 09/06/2017 0.2  0.0 - 0.5 K/uL Final    Baso # 09/06/2017 0.02  0.00 - 0.20 K/uL Final    Gran% 09/06/2017 58.3  38.0 - 73.0 % Final    Lymph% 09/06/2017 28.5  18.0 - 48.0 % Final    Mono% 09/06/2017 10.9  4.0 - 15.0 % Final    Eosinophil% 09/06/2017 2.1  0.0 - 8.0 % Final    Basophil% 09/06/2017 0.2  0.0 - 1.9 % Final    Differential Method 09/06/2017 Automated   Final     Assessment:       1. Essential hypertension        Plan:     Blood pressure 160/64 right arm sitting.  We'll change  blood pressures medication increase lisinopril 20-12 0.5 and take  2 day.  Home blood pressure monitor and follow up in 1 month    Essential  hypertension  -     benazepril-hydrochlorthiazide (LOTENSIN HCT) 20-12.5 mg per tablet; Take 2 tablets by mouth once daily.  Dispense: 180 tablet; Refill: 3

## 2018-01-25 ENCOUNTER — TELEPHONE (OUTPATIENT)
Dept: INTERNAL MEDICINE | Facility: CLINIC | Age: 83
End: 2018-01-25

## 2018-01-25 NOTE — TELEPHONE ENCOUNTER
Spoke with pt daughter, informed too early for a refill to double on  meds he has at home, will be able to pick it up on 1/30/18. Verbalized understanding.

## 2018-01-25 NOTE — TELEPHONE ENCOUNTER
----- Message from Kaylee Patterson sent at 1/25/2018 10:42 AM CST -----  Contact: daughter   Would like to consult with nurse to know if new BP Medication was supposed to be called in. Please call back at [phone].      PT USES:  [id number] - [place] - [address] [facility] [address] AT [place] [id number] [facility] [address]  [place] [address]  Phone: [phone] Fax: [phone]

## 2018-02-22 ENCOUNTER — OFFICE VISIT (OUTPATIENT)
Dept: INTERNAL MEDICINE | Facility: CLINIC | Age: 83
End: 2018-02-22
Payer: MEDICARE

## 2018-02-22 VITALS
OXYGEN SATURATION: 99 % | WEIGHT: 136.69 LBS | DIASTOLIC BLOOD PRESSURE: 67 MMHG | TEMPERATURE: 98 F | HEART RATE: 59 BPM | SYSTOLIC BLOOD PRESSURE: 142 MMHG | BODY MASS INDEX: 22.77 KG/M2 | HEIGHT: 65 IN

## 2018-02-22 DIAGNOSIS — C67.9 MALIGNANT NEOPLASM OF URINARY BLADDER, UNSPECIFIED SITE: ICD-10-CM

## 2018-02-22 DIAGNOSIS — C61 MALIGNANT NEOPLASM OF PROSTATE: ICD-10-CM

## 2018-02-22 DIAGNOSIS — I10 ESSENTIAL HYPERTENSION: Primary | ICD-10-CM

## 2018-02-22 PROCEDURE — 1159F MED LIST DOCD IN RCRD: CPT | Mod: ,,, | Performed by: FAMILY MEDICINE

## 2018-02-22 PROCEDURE — 99213 OFFICE O/P EST LOW 20 MIN: CPT | Mod: S$PBB,,, | Performed by: FAMILY MEDICINE

## 2018-02-22 PROCEDURE — 99999 PR PBB SHADOW E&M-EST. PATIENT-LVL III: CPT | Mod: PBBFAC,,, | Performed by: FAMILY MEDICINE

## 2018-02-22 PROCEDURE — 99213 OFFICE O/P EST LOW 20 MIN: CPT | Mod: PBBFAC,PO | Performed by: FAMILY MEDICINE

## 2018-02-22 PROCEDURE — 1126F AMNT PAIN NOTED NONE PRSNT: CPT | Mod: ,,, | Performed by: FAMILY MEDICINE

## 2018-02-22 RX ORDER — BENAZEPRIL HYDROCHLORIDE AND HYDROCHLOROTHIAZIDE 20; 12.5 MG/1; MG/1
1 TABLET ORAL DAILY
Qty: 90 TABLET | Refills: 3
Start: 2018-02-22 | End: 2018-05-23 | Stop reason: SDUPTHER

## 2018-02-22 NOTE — PROGRESS NOTES
Subjective:       Patient ID: Coy López is a 89 y.o. male.    Chief Complaint: Follow-up    daughter reports blood pressure was low when he took benazepril HCTZ 2 qd.   She decreased to one and a half a day blood pressure was  still low.  she doesn't remember the actual value and forgot report at home.  He denied any weakness at that time.      Review of Systems   Constitutional: Negative for appetite change, fatigue and unexpected weight change.   Respiratory: Negative for cough, shortness of breath and wheezing.    Cardiovascular: Negative for chest pain, palpitations and leg swelling.   Gastrointestinal: Negative for abdominal pain.   Genitourinary: Negative for difficulty urinating.   Musculoskeletal:        Mild discomfort right shoulder since his recent fall.  He reports he had some shoulder difficulty also prior to fall   Neurological: Negative for weakness and headaches.       Objective:      Physical Exam   Constitutional: He is oriented to person, place, and time. He appears well-developed and well-nourished. No distress.   Neck: Neck supple. No thyromegaly present.   Cardiovascular: Normal rate, regular rhythm and normal heart sounds.    No murmur heard.  Pulmonary/Chest: Effort normal and breath sounds normal. No respiratory distress. He has no wheezes.   Abdominal: Soft. Bowel sounds are normal. He exhibits no mass. There is no tenderness.   Musculoskeletal:   No shoulder tenderness.  Mild discomfort with range of motion.  Some resistance to full range of motion.  No deformity present.   Lymphadenopathy:     He has no cervical adenopathy.   Neurological: He is alert and oriented to person, place, and time.       Office Visit on 09/06/2017   Component Date Value Ref Range Status    Sodium 09/06/2017 143  136 - 145 mmol/L Final    Potassium 09/06/2017 5.0  3.5 - 5.1 mmol/L Final    Chloride 09/06/2017 109  95 - 110 mmol/L Final    CO2 09/06/2017 22* 23 - 29 mmol/L Final    Glucose  09/06/2017 83  70 - 110 mg/dL Final    BUN, Bld 09/06/2017 24  10 - 30 mg/dL Final    Creatinine 09/06/2017 1.1  0.5 - 1.4 mg/dL Final    Calcium 09/06/2017 9.5  8.7 - 10.5 mg/dL Final    Total Protein 09/06/2017 7.7  6.0 - 8.4 g/dL Final    Albumin 09/06/2017 3.7  3.5 - 5.2 g/dL Final    Total Bilirubin 09/06/2017 0.8  0.1 - 1.0 mg/dL Final    Alkaline Phosphatase 09/06/2017 59  55 - 135 U/L Final    AST 09/06/2017 32  10 - 40 U/L Final    ALT 09/06/2017 16  10 - 44 U/L Final    Anion Gap 09/06/2017 12  8 - 16 mmol/L Final    eGFR if African American 09/06/2017 >60.0  >60 mL/min/1.73 m^2 Final    eGFR if non African American 09/06/2017 58.0* >60 mL/min/1.73 m^2 Final    Cholesterol 09/06/2017 178  120 - 199 mg/dL Final    Triglycerides 09/06/2017 90  30 - 150 mg/dL Final    HDL 09/06/2017 48  40 - 75 mg/dL Final    LDL Cholesterol 09/06/2017 112.0  63.0 - 159.0 mg/dL Final    HDL/Chol Ratio 09/06/2017 27.0  20.0 - 50.0 % Final    Total Cholesterol/HDL Ratio 09/06/2017 3.7  2.0 - 5.0 Final    Non-HDL Cholesterol 09/06/2017 130  mg/dL Final    WBC 09/06/2017 8.38  3.90 - 12.70 K/uL Final    RBC 09/06/2017 4.32* 4.60 - 6.20 M/uL Final    Hemoglobin 09/06/2017 13.2* 14.0 - 18.0 g/dL Final    Hematocrit 09/06/2017 37.6* 40.0 - 54.0 % Final    MCV 09/06/2017 87  82 - 98 fL Final    MCH 09/06/2017 30.6  27.0 - 31.0 pg Final    MCHC 09/06/2017 35.1  32.0 - 36.0 g/dL Final    RDW 09/06/2017 13.4  11.5 - 14.5 % Final    Platelets 09/06/2017 209  150 - 350 K/uL Final    MPV 09/06/2017 11.8  9.2 - 12.9 fL Final    Gran # (ANC) 09/06/2017 4.9  1.8 - 7.7 K/uL Final    Lymph # 09/06/2017 2.4  1.0 - 4.8 K/uL Final    Mono # 09/06/2017 0.9  0.3 - 1.0 K/uL Final    Eos # 09/06/2017 0.2  0.0 - 0.5 K/uL Final    Baso # 09/06/2017 0.02  0.00 - 0.20 K/uL Final    Gran% 09/06/2017 58.3  38.0 - 73.0 % Final    Lymph% 09/06/2017 28.5  18.0 - 48.0 % Final    Mono% 09/06/2017 10.9  4.0 - 15.0 % Final     Eosinophil% 09/06/2017 2.1  0.0 - 8.0 % Final    Basophil% 09/06/2017 0.2  0.0 - 1.9 % Final    Differential Method 09/06/2017 Automated   Final     Assessment:       No diagnosis found.    Plan:     Blood pressure noted.  Will continue benazepril 20-12 0.5 once a day.  Heat range of motion for right shoulder.  Monitor blood pressure home and return in 3 months  medical history includes prostate and bladder cancer.  This was diagnosed and treated years ago on has no current symptoms.    There are no diagnoses linked to this encounter.

## 2018-05-09 ENCOUNTER — PATIENT OUTREACH (OUTPATIENT)
Dept: ADMINISTRATIVE | Facility: HOSPITAL | Age: 83
End: 2018-05-09

## 2018-05-23 ENCOUNTER — OFFICE VISIT (OUTPATIENT)
Dept: INTERNAL MEDICINE | Facility: CLINIC | Age: 83
End: 2018-05-23
Payer: MEDICARE

## 2018-05-23 VITALS
SYSTOLIC BLOOD PRESSURE: 120 MMHG | HEIGHT: 65 IN | BODY MASS INDEX: 22.75 KG/M2 | TEMPERATURE: 97 F | WEIGHT: 136.56 LBS | DIASTOLIC BLOOD PRESSURE: 70 MMHG | HEART RATE: 52 BPM | OXYGEN SATURATION: 99 %

## 2018-05-23 DIAGNOSIS — M47.816 SPONDYLOSIS OF LUMBAR REGION WITHOUT MYELOPATHY OR RADICULOPATHY: ICD-10-CM

## 2018-05-23 DIAGNOSIS — I10 ESSENTIAL HYPERTENSION: Primary | ICD-10-CM

## 2018-05-23 DIAGNOSIS — I73.9 PAD (PERIPHERAL ARTERY DISEASE): ICD-10-CM

## 2018-05-23 PROCEDURE — 99213 OFFICE O/P EST LOW 20 MIN: CPT | Mod: S$PBB,,, | Performed by: FAMILY MEDICINE

## 2018-05-23 PROCEDURE — 99213 OFFICE O/P EST LOW 20 MIN: CPT | Mod: PBBFAC,PO | Performed by: FAMILY MEDICINE

## 2018-05-23 PROCEDURE — 99999 PR PBB SHADOW E&M-EST. PATIENT-LVL III: CPT | Mod: PBBFAC,,, | Performed by: FAMILY MEDICINE

## 2018-05-23 RX ORDER — BENAZEPRIL HYDROCHLORIDE AND HYDROCHLOROTHIAZIDE 20; 12.5 MG/1; MG/1
0.5 TABLET ORAL DAILY
Start: 2018-05-23 | End: 2019-01-14

## 2018-05-23 NOTE — PROGRESS NOTES
Subjective:       Patient ID: Coy López is a 89 y.o. male.    Chief Complaint: Follow-up    Follow-up hypertension.  Start reports that his blood pressure daily at home is somewhat erratic.  He currently is taking one half tablet of lisinopril HCTZ day.  He occasionally has episodes of weakness.  Denies headache chest pain palpitations or shortness of breath or edema      Review of Systems   Constitutional: Positive for fatigue. Negative for appetite change and unexpected weight change.   Respiratory: Negative for cough, shortness of breath and wheezing.    Cardiovascular: Negative for chest pain, palpitations and leg swelling.   Gastrointestinal: Negative for abdominal pain.   Genitourinary: Negative for difficulty urinating.   Musculoskeletal: Positive for arthralgias.   Neurological: Positive for weakness. Negative for headaches.   Psychiatric/Behavioral: Negative for confusion.       Objective:      Physical Exam   Constitutional: He appears well-developed and well-nourished.   Cardiovascular: Normal rate and regular rhythm.    Pulmonary/Chest: Effort normal and breath sounds normal. No respiratory distress.   Abdominal: Soft. He exhibits no distension.   Lymphadenopathy:     He has no cervical adenopathy.       Office Visit on 09/06/2017   Component Date Value Ref Range Status    Sodium 09/06/2017 143  136 - 145 mmol/L Final    Potassium 09/06/2017 5.0  3.5 - 5.1 mmol/L Final    Chloride 09/06/2017 109  95 - 110 mmol/L Final    CO2 09/06/2017 22* 23 - 29 mmol/L Final    Glucose 09/06/2017 83  70 - 110 mg/dL Final    BUN, Bld 09/06/2017 24  10 - 30 mg/dL Final    Creatinine 09/06/2017 1.1  0.5 - 1.4 mg/dL Final    Calcium 09/06/2017 9.5  8.7 - 10.5 mg/dL Final    Total Protein 09/06/2017 7.7  6.0 - 8.4 g/dL Final    Albumin 09/06/2017 3.7  3.5 - 5.2 g/dL Final    Total Bilirubin 09/06/2017 0.8  0.1 - 1.0 mg/dL Final    Alkaline Phosphatase 09/06/2017 59  55 - 135 U/L Final    AST  09/06/2017 32  10 - 40 U/L Final    ALT 09/06/2017 16  10 - 44 U/L Final    Anion Gap 09/06/2017 12  8 - 16 mmol/L Final    eGFR if African American 09/06/2017 >60.0  >60 mL/min/1.73 m^2 Final    eGFR if non African American 09/06/2017 58.0* >60 mL/min/1.73 m^2 Final    Cholesterol 09/06/2017 178  120 - 199 mg/dL Final    Triglycerides 09/06/2017 90  30 - 150 mg/dL Final    HDL 09/06/2017 48  40 - 75 mg/dL Final    LDL Cholesterol 09/06/2017 112.0  63.0 - 159.0 mg/dL Final    HDL/Chol Ratio 09/06/2017 27.0  20.0 - 50.0 % Final    Total Cholesterol/HDL Ratio 09/06/2017 3.7  2.0 - 5.0 Final    Non-HDL Cholesterol 09/06/2017 130  mg/dL Final    WBC 09/06/2017 8.38  3.90 - 12.70 K/uL Final    RBC 09/06/2017 4.32* 4.60 - 6.20 M/uL Final    Hemoglobin 09/06/2017 13.2* 14.0 - 18.0 g/dL Final    Hematocrit 09/06/2017 37.6* 40.0 - 54.0 % Final    MCV 09/06/2017 87  82 - 98 fL Final    MCH 09/06/2017 30.6  27.0 - 31.0 pg Final    MCHC 09/06/2017 35.1  32.0 - 36.0 g/dL Final    RDW 09/06/2017 13.4  11.5 - 14.5 % Final    Platelets 09/06/2017 209  150 - 350 K/uL Final    MPV 09/06/2017 11.8  9.2 - 12.9 fL Final    Gran # (ANC) 09/06/2017 4.9  1.8 - 7.7 K/uL Final    Lymph # 09/06/2017 2.4  1.0 - 4.8 K/uL Final    Mono # 09/06/2017 0.9  0.3 - 1.0 K/uL Final    Eos # 09/06/2017 0.2  0.0 - 0.5 K/uL Final    Baso # 09/06/2017 0.02  0.00 - 0.20 K/uL Final    Gran% 09/06/2017 58.3  38.0 - 73.0 % Final    Lymph% 09/06/2017 28.5  18.0 - 48.0 % Final    Mono% 09/06/2017 10.9  4.0 - 15.0 % Final    Eosinophil% 09/06/2017 2.1  0.0 - 8.0 % Final    Basophil% 09/06/2017 0.2  0.0 - 1.9 % Final    Differential Method 09/06/2017 Automated   Final     Assessment:       1. Essential hypertension      blood pressure 120/70.  Will continue current dose of medication.  Follow-up in one month.  Continue aspirin daily if needed for degenerative arthritis of the lumbar spine.  Recommend taken aspirin with food.  He has  peripheral artery disease documented by podiatry.  Plan:         Essential hypertension  -     benazepril-hydrochlorthiazide (LOTENSIN HCT) 20-12.5 mg per tablet; Take 0.5 tablets by mouth once daily.

## 2018-06-11 ENCOUNTER — PATIENT OUTREACH (OUTPATIENT)
Dept: ADMINISTRATIVE | Facility: HOSPITAL | Age: 83
End: 2018-06-11

## 2018-06-25 ENCOUNTER — OFFICE VISIT (OUTPATIENT)
Dept: INTERNAL MEDICINE | Facility: CLINIC | Age: 83
End: 2018-06-25
Payer: MEDICARE

## 2018-06-25 VITALS
TEMPERATURE: 98 F | BODY MASS INDEX: 22.67 KG/M2 | OXYGEN SATURATION: 99 % | DIASTOLIC BLOOD PRESSURE: 60 MMHG | SYSTOLIC BLOOD PRESSURE: 130 MMHG | WEIGHT: 136.25 LBS | HEART RATE: 63 BPM

## 2018-06-25 DIAGNOSIS — N18.30 CHRONIC KIDNEY DISEASE (CKD), STAGE III (MODERATE): ICD-10-CM

## 2018-06-25 DIAGNOSIS — I10 ESSENTIAL HYPERTENSION: Primary | ICD-10-CM

## 2018-06-25 DIAGNOSIS — M47.816 SPONDYLOSIS OF LUMBAR REGION WITHOUT MYELOPATHY OR RADICULOPATHY: ICD-10-CM

## 2018-06-25 LAB
ALBUMIN SERPL BCP-MCNC: 3.7 G/DL
ANION GAP SERPL CALC-SCNC: 7 MMOL/L
BASOPHILS # BLD AUTO: 0.04 K/UL
BASOPHILS NFR BLD: 0.7 %
BUN SERPL-MCNC: 20 MG/DL
CALCIUM SERPL-MCNC: 9.9 MG/DL
CHLORIDE SERPL-SCNC: 106 MMOL/L
CO2 SERPL-SCNC: 30 MMOL/L
CREAT SERPL-MCNC: 1 MG/DL
DIFFERENTIAL METHOD: ABNORMAL
EOSINOPHIL # BLD AUTO: 0.1 K/UL
EOSINOPHIL NFR BLD: 2.4 %
ERYTHROCYTE [DISTWIDTH] IN BLOOD BY AUTOMATED COUNT: 12.5 %
EST. GFR  (AFRICAN AMERICAN): >60 ML/MIN/1.73 M^2
EST. GFR  (NON AFRICAN AMERICAN): >60 ML/MIN/1.73 M^2
GLUCOSE SERPL-MCNC: 154 MG/DL
HCT VFR BLD AUTO: 38.3 %
HGB BLD-MCNC: 12.8 G/DL
IMM GRANULOCYTES # BLD AUTO: 0.01 K/UL
IMM GRANULOCYTES NFR BLD AUTO: 0.2 %
LYMPHOCYTES # BLD AUTO: 1.8 K/UL
LYMPHOCYTES NFR BLD: 30.2 %
MCH RBC QN AUTO: 30.8 PG
MCHC RBC AUTO-ENTMCNC: 33.4 G/DL
MCV RBC AUTO: 92 FL
MONOCYTES # BLD AUTO: 0.6 K/UL
MONOCYTES NFR BLD: 10 %
NEUTROPHILS # BLD AUTO: 3.3 K/UL
NEUTROPHILS NFR BLD: 56.5 %
NRBC BLD-RTO: 0 /100 WBC
PHOSPHATE SERPL-MCNC: 2.7 MG/DL
PLATELET # BLD AUTO: 222 K/UL
PMV BLD AUTO: 10.9 FL
POTASSIUM SERPL-SCNC: 4.8 MMOL/L
RBC # BLD AUTO: 4.15 M/UL
SODIUM SERPL-SCNC: 143 MMOL/L
WBC # BLD AUTO: 5.89 K/UL

## 2018-06-25 PROCEDURE — 85025 COMPLETE CBC W/AUTO DIFF WBC: CPT

## 2018-06-25 PROCEDURE — 99999 PR PBB SHADOW E&M-EST. PATIENT-LVL III: CPT | Mod: PBBFAC,,, | Performed by: FAMILY MEDICINE

## 2018-06-25 PROCEDURE — 80069 RENAL FUNCTION PANEL: CPT

## 2018-06-25 PROCEDURE — 99213 OFFICE O/P EST LOW 20 MIN: CPT | Mod: S$PBB,,, | Performed by: FAMILY MEDICINE

## 2018-06-25 PROCEDURE — 99213 OFFICE O/P EST LOW 20 MIN: CPT | Mod: PBBFAC,PO | Performed by: FAMILY MEDICINE

## 2018-06-25 NOTE — PROGRESS NOTES
Subjective:       Patient ID: Coy López is a 89 y.o. male.    Chief Complaint: f/u HBP    Follow-up hypertension chronic kidney disease degenerative arthritis.  Home blood pressure systolic is in the 160-170 range most of the time.  He denies headache chest pain palpitations or shortness of breath.  He reports about 1-2 hours of morning pain and stiffness upon getting up.  He feels better after moving around.  He denies edema.      Review of Systems   Constitutional: Positive for fatigue. Negative for appetite change and unexpected weight change.   Respiratory: Negative for cough, shortness of breath and wheezing.    Cardiovascular: Negative for chest pain, palpitations and leg swelling.   Gastrointestinal: Negative for abdominal pain.   Genitourinary: Negative for difficulty urinating.   Musculoskeletal: Positive for arthralgias.   Neurological: Negative for headaches.       Objective:      Physical Exam   Constitutional: He is oriented to person, place, and time. He appears well-developed and well-nourished. No distress.   Neck: Neck supple. No thyromegaly present.   Cardiovascular: Normal rate, regular rhythm and normal heart sounds.    No murmur heard.  Pulmonary/Chest: Effort normal and breath sounds normal. No respiratory distress. He has no wheezes.   Abdominal: Soft. Bowel sounds are normal. He exhibits no mass. There is no tenderness.   Lymphadenopathy:     He has no cervical adenopathy.   Neurological: He is alert and oriented to person, place, and time.       Office Visit on 09/06/2017   Component Date Value Ref Range Status    Sodium 09/06/2017 143  136 - 145 mmol/L Final    Potassium 09/06/2017 5.0  3.5 - 5.1 mmol/L Final    Chloride 09/06/2017 109  95 - 110 mmol/L Final    CO2 09/06/2017 22* 23 - 29 mmol/L Final    Glucose 09/06/2017 83  70 - 110 mg/dL Final    BUN, Bld 09/06/2017 24  10 - 30 mg/dL Final    Creatinine 09/06/2017 1.1  0.5 - 1.4 mg/dL Final    Calcium 09/06/2017 9.5   8.7 - 10.5 mg/dL Final    Total Protein 09/06/2017 7.7  6.0 - 8.4 g/dL Final    Albumin 09/06/2017 3.7  3.5 - 5.2 g/dL Final    Total Bilirubin 09/06/2017 0.8  0.1 - 1.0 mg/dL Final    Alkaline Phosphatase 09/06/2017 59  55 - 135 U/L Final    AST 09/06/2017 32  10 - 40 U/L Final    ALT 09/06/2017 16  10 - 44 U/L Final    Anion Gap 09/06/2017 12  8 - 16 mmol/L Final    eGFR if African American 09/06/2017 >60.0  >60 mL/min/1.73 m^2 Final    eGFR if non African American 09/06/2017 58.0* >60 mL/min/1.73 m^2 Final    Cholesterol 09/06/2017 178  120 - 199 mg/dL Final    Triglycerides 09/06/2017 90  30 - 150 mg/dL Final    HDL 09/06/2017 48  40 - 75 mg/dL Final    LDL Cholesterol 09/06/2017 112.0  63.0 - 159.0 mg/dL Final    HDL/Chol Ratio 09/06/2017 27.0  20.0 - 50.0 % Final    Total Cholesterol/HDL Ratio 09/06/2017 3.7  2.0 - 5.0 Final    Non-HDL Cholesterol 09/06/2017 130  mg/dL Final    WBC 09/06/2017 8.38  3.90 - 12.70 K/uL Final    RBC 09/06/2017 4.32* 4.60 - 6.20 M/uL Final    Hemoglobin 09/06/2017 13.2* 14.0 - 18.0 g/dL Final    Hematocrit 09/06/2017 37.6* 40.0 - 54.0 % Final    MCV 09/06/2017 87  82 - 98 fL Final    MCH 09/06/2017 30.6  27.0 - 31.0 pg Final    MCHC 09/06/2017 35.1  32.0 - 36.0 g/dL Final    RDW 09/06/2017 13.4  11.5 - 14.5 % Final    Platelets 09/06/2017 209  150 - 350 K/uL Final    MPV 09/06/2017 11.8  9.2 - 12.9 fL Final    Gran # (ANC) 09/06/2017 4.9  1.8 - 7.7 K/uL Final    Lymph # 09/06/2017 2.4  1.0 - 4.8 K/uL Final    Mono # 09/06/2017 0.9  0.3 - 1.0 K/uL Final    Eos # 09/06/2017 0.2  0.0 - 0.5 K/uL Final    Baso # 09/06/2017 0.02  0.00 - 0.20 K/uL Final    Gran% 09/06/2017 58.3  38.0 - 73.0 % Final    Lymph% 09/06/2017 28.5  18.0 - 48.0 % Final    Mono% 09/06/2017 10.9  4.0 - 15.0 % Final    Eosinophil% 09/06/2017 2.1  0.0 - 8.0 % Final    Basophil% 09/06/2017 0.2  0.0 - 1.9 % Final    Differential Method 09/06/2017 Automated   Final     Assessment:        1. Essential hypertension    2. Chronic kidney disease (CKD), stage III (moderate)        Plan:   His blood pressure monitor is running about 20 points higher rsystolic  than my check his blood pressure.  Will continue lisinopril one half a morning and if systolic blood pressure 160 or greater in the evening take an additional half dose of lisinopril.  Lab was ordered.  Recommended taking  one aspirin at bedtime for degenerative arthritis.  Follow-up in one month      Essential hypertension  -     CBC auto differential    Chronic kidney disease (CKD), stage III (moderate)  -     RENAL FUNCTION PANEL; Future; Expected date: 06/25/2018

## 2018-07-09 ENCOUNTER — PATIENT OUTREACH (OUTPATIENT)
Dept: ADMINISTRATIVE | Facility: HOSPITAL | Age: 83
End: 2018-07-09

## 2018-07-23 ENCOUNTER — OFFICE VISIT (OUTPATIENT)
Dept: INTERNAL MEDICINE | Facility: CLINIC | Age: 83
End: 2018-07-23
Payer: MEDICARE

## 2018-07-23 VITALS
HEIGHT: 65 IN | HEART RATE: 54 BPM | OXYGEN SATURATION: 100 % | BODY MASS INDEX: 22.1 KG/M2 | TEMPERATURE: 97 F | SYSTOLIC BLOOD PRESSURE: 150 MMHG | WEIGHT: 132.63 LBS | DIASTOLIC BLOOD PRESSURE: 62 MMHG

## 2018-07-23 DIAGNOSIS — R03.0 ELEVATED BLOOD PRESSURE READING: ICD-10-CM

## 2018-07-23 DIAGNOSIS — I10 ESSENTIAL HYPERTENSION: Primary | ICD-10-CM

## 2018-07-23 DIAGNOSIS — K11.7 DROOLING: ICD-10-CM

## 2018-07-23 PROCEDURE — 99999 PR PBB SHADOW E&M-EST. PATIENT-LVL III: CPT | Mod: PBBFAC,,, | Performed by: FAMILY MEDICINE

## 2018-07-23 PROCEDURE — 99213 OFFICE O/P EST LOW 20 MIN: CPT | Mod: S$PBB,,, | Performed by: FAMILY MEDICINE

## 2018-07-23 PROCEDURE — 99213 OFFICE O/P EST LOW 20 MIN: CPT | Mod: PBBFAC,PO | Performed by: FAMILY MEDICINE

## 2018-07-23 NOTE — PROGRESS NOTES
Subjective:       Patient ID: Coy López is a 89 y.o. male.    Chief Complaint: Follow-up (back and leg pain, patient also has concerns with sleeping and drooling)    Family is concerned about watery eyes drooling of the mouth.  Also abnormal toenails.  Home blood pressures systolic has improved and is in 130-150 range usually.  He denies chest pain palpitations shortness of breath.      Review of Systems   Constitutional: Positive for fatigue. Negative for appetite change and unexpected weight change.   Respiratory: Negative for cough, shortness of breath and wheezing.    Cardiovascular: Negative for chest pain, palpitations and leg swelling.   Gastrointestinal: Negative for abdominal pain.   Genitourinary: Negative for difficulty urinating.   Musculoskeletal: Positive for arthralgias.   Skin:        Thickened toenails.  He tried an antifungal lotion in  the past  that did not help   Neurological: Negative for headaches.       Objective:      Physical Exam   Constitutional: He is oriented to person, place, and time. He appears well-developed and well-nourished. No distress.   Neck: Neck supple. No thyromegaly present.   Cardiovascular: Normal rate, regular rhythm and normal heart sounds.    No murmur heard.  Pulmonary/Chest: Effort normal and breath sounds normal. No respiratory distress. He has no wheezes.   Abdominal: Soft. Bowel sounds are normal. He exhibits no mass. There is no tenderness.   Lymphadenopathy:     He has no cervical adenopathy.   Neurological: He is alert and oriented to person, place, and time.   Skin: He is not diaphoretic.       Office Visit on 06/25/2018   Component Date Value Ref Range Status    WBC 06/25/2018 5.89  3.90 - 12.70 K/uL Final    RBC 06/25/2018 4.15* 4.60 - 6.20 M/uL Final    Hemoglobin 06/25/2018 12.8* 14.0 - 18.0 g/dL Final    Hematocrit 06/25/2018 38.3* 40.0 - 54.0 % Final    MCV 06/25/2018 92  82 - 98 fL Final    MCH 06/25/2018 30.8  27.0 - 31.0 pg Final     MCHC 06/25/2018 33.4  32.0 - 36.0 g/dL Final    RDW 06/25/2018 12.5  11.5 - 14.5 % Final    Platelets 06/25/2018 222  150 - 350 K/uL Final    MPV 06/25/2018 10.9  9.2 - 12.9 fL Final    Immature Granulocytes 06/25/2018 0.2  0.0 - 0.5 % Final    Gran # (ANC) 06/25/2018 3.3  1.8 - 7.7 K/uL Final    Immature Grans (Abs) 06/25/2018 0.01  0.00 - 0.04 K/uL Final    Lymph # 06/25/2018 1.8  1.0 - 4.8 K/uL Final    Mono # 06/25/2018 0.6  0.3 - 1.0 K/uL Final    Eos # 06/25/2018 0.1  0.0 - 0.5 K/uL Final    Baso # 06/25/2018 0.04  0.00 - 0.20 K/uL Final    nRBC 06/25/2018 0  0 /100 WBC Final    Gran% 06/25/2018 56.5  38.0 - 73.0 % Final    Lymph% 06/25/2018 30.2  18.0 - 48.0 % Final    Mono% 06/25/2018 10.0  4.0 - 15.0 % Final    Eosinophil% 06/25/2018 2.4  0.0 - 8.0 % Final    Basophil% 06/25/2018 0.7  0.0 - 1.9 % Final    Differential Method 06/25/2018 Automated   Final    Glucose 06/25/2018 154* 70 - 110 mg/dL Final    Sodium 06/25/2018 143  136 - 145 mmol/L Final    Potassium 06/25/2018 4.8  3.5 - 5.1 mmol/L Final    Chloride 06/25/2018 106  95 - 110 mmol/L Final    CO2 06/25/2018 30* 23 - 29 mmol/L Final    BUN, Bld 06/25/2018 20  10 - 30 mg/dL Final    Calcium 06/25/2018 9.9  8.7 - 10.5 mg/dL Final    Creatinine 06/25/2018 1.0  0.5 - 1.4 mg/dL Final    Albumin 06/25/2018 3.7  3.5 - 5.2 g/dL Final    Phosphorus 06/25/2018 2.7  2.7 - 4.5 mg/dL Final    eGFR if African American 06/25/2018 >60.0  >60 mL/min/1.73 m^2 Final    eGFR if non African American 06/25/2018 >60.0  >60 mL/min/1.73 m^2 Final    Anion Gap 06/25/2018 7* 8 - 16 mmol/L Final     Assessment:       No diagnosis found.    Plan:   Watery eyes probably related to effort and lower eyelids.  Ofelia mild probably related to dentures.  Daughter is tremor in thickened toenails.  Discuss CBC including level of his hemoglobin.  Blood pressure recheck 150/62.  Follow-up in 6 weeks.      There are no diagnoses linked to this  encounter.

## 2018-08-20 ENCOUNTER — OFFICE VISIT (OUTPATIENT)
Dept: URGENT CARE | Facility: CLINIC | Age: 83
End: 2018-08-20
Payer: MEDICARE

## 2018-08-20 ENCOUNTER — HOSPITAL ENCOUNTER (OUTPATIENT)
Dept: RADIOLOGY | Facility: HOSPITAL | Age: 83
Discharge: HOME | End: 2018-08-20
Attending: FAMILY MEDICINE
Payer: MEDICARE

## 2018-08-20 VITALS
TEMPERATURE: 98 F | OXYGEN SATURATION: 97 % | HEIGHT: 65 IN | WEIGHT: 131.38 LBS | BODY MASS INDEX: 21.89 KG/M2 | HEART RATE: 50 BPM | RESPIRATION RATE: 16 BRPM | DIASTOLIC BLOOD PRESSURE: 60 MMHG | SYSTOLIC BLOOD PRESSURE: 152 MMHG
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
## 2018-08-20 PROCEDURE — 99999 PR PBB SHADOW E&M-EST. PATIENT-LVL IV: CPT | Mod: PBBFAC,,, | Performed by: FAMILY MEDICINE

## 2018-08-20 PROCEDURE — 99214 OFFICE O/P EST MOD 30 MIN: CPT | Mod: PBBFAC,PO,25 | Performed by: FAMILY MEDICINE

## 2018-08-20 PROCEDURE — 99213 OFFICE O/P EST LOW 20 MIN: CPT | Mod: S$PBB,,, | Performed by: FAMILY MEDICINE

## 2018-08-20 PROCEDURE — 72202 X-RAY EXAM SI JOINTS 3/> VWS: CPT | Mod: TC,FY,PO

## 2018-08-20 PROCEDURE — 72202 X-RAY EXAM SI JOINTS 3/> VWS: CPT | Mod: 26,,, | Performed by: RADIOLOGY

## 2018-08-20 NOTE — PROGRESS NOTES
"Subjective:       Patient ID: Coy López is a 89 y.o. male.    Chief Complaint: Fall    BP (!) 152/60 (BP Location: Right arm, Patient Position: Sitting, BP Method: Small (Manual))   Pulse (!) 50   Temp 97.8 °F (36.6 °C) (Tympanic)   Resp 16   Ht 5' 5" (1.651 m)   Wt 59.6 kg (131 lb 6.3 oz)   SpO2 97%   BMI 21.87 kg/m²     HPI  Fell 3 hours ago, slipped from a rolling office chair, landed on buttock, hurts. Did not hit head or anywhere else. No LOC. Sat on ice and took 650 mg tylenol.  Live with wife who has memory loss. PCP appointment tomorrow.     Review of Systems   Constitutional: Negative for activity change.   Musculoskeletal: Positive for back pain.       Objective:      Physical Exam   Constitutional: He is oriented to person, place, and time. No distress.   HENT:   Head: Normocephalic and atraumatic.   Eyes: EOM are normal. Pupils are equal, round, and reactive to light.   Neck: Normal range of motion.   Pulmonary/Chest: Effort normal. No respiratory distress.   Musculoskeletal:   TTP over right sacroiliac    Neurological: He is alert and oriented to person, place, and time. No cranial nerve deficit.   Skin: Skin is warm and dry. He is not diaphoretic.   Nursing note and vitals reviewed.      Assessment:       1. Fall, initial encounter        Plan:     Coy was seen today for fall.    Diagnoses and all orders for this visit:    Fall, initial encounter  -     X-Ray Sacroiliac Joints Complete; Future      Instructions  1. Ice. Tylenol for pain as needed  2. Will call you for xray report  3. Keep you PCP appointment tomorrow    Discussed with pt/family all information and results pertaining to this visit. Discussed diagnosis and plan of treatment.  All questions and concerns were addressed at this time. Pt/family expresses understanding of information and instructions.  Care and follow up instruction provided.  "

## 2018-08-20 NOTE — PATIENT INSTRUCTIONS
1. Ice. Tylenol for pain as needed  2. Will call you for xray report  3. Keep you PCP appointment tomorrow

## 2018-08-21 ENCOUNTER — OFFICE VISIT (OUTPATIENT)
Dept: INTERNAL MEDICINE | Facility: CLINIC | Age: 83
End: 2018-08-21
Payer: MEDICARE

## 2018-08-21 VITALS
HEIGHT: 65 IN | DIASTOLIC BLOOD PRESSURE: 60 MMHG | HEART RATE: 62 BPM | OXYGEN SATURATION: 100 % | SYSTOLIC BLOOD PRESSURE: 130 MMHG | WEIGHT: 130.31 LBS | BODY MASS INDEX: 21.71 KG/M2 | TEMPERATURE: 98 F

## 2018-08-21 DIAGNOSIS — R03.0 ELEVATED BLOOD PRESSURE READING: ICD-10-CM
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
## 2018-08-21 PROCEDURE — 99213 OFFICE O/P EST LOW 20 MIN: CPT | Mod: PBBFAC,PO | Performed by: FAMILY MEDICINE

## 2018-08-21 PROCEDURE — 99999 PR PBB SHADOW E&M-EST. PATIENT-LVL III: CPT | Mod: PBBFAC,,, | Performed by: FAMILY MEDICINE

## 2018-08-21 PROCEDURE — 99213 OFFICE O/P EST LOW 20 MIN: CPT | Mod: S$PBB,,, | Performed by: FAMILY MEDICINE

## 2018-08-21 NOTE — PROGRESS NOTES
Subjective:       Patient ID: Coy López is a 89 y.o. male.    Chief Complaint: Fall    Fell at home yesterday when the chair  he was leaning on fell over.  He landed on his buttocks area.  Denies any head trauma.  X-ray of the lumbar spine was done yesterday no fracture reported.  Has some discomfort in the area of the coccyx.      Review of Systems   Respiratory: Negative for cough and shortness of breath.    Cardiovascular: Negative for chest pain and palpitations.   Genitourinary: Negative for hematuria.   Musculoskeletal: Positive for back pain.   Neurological: Negative for headaches.       Objective:      Physical Exam   Constitutional: He appears well-developed and well-nourished. No distress.   Cardiovascular: Normal rate and regular rhythm.   Pulmonary/Chest: Effort normal and breath sounds normal.   Musculoskeletal:   Mild tenderness with no deformity on the right side of the coccyx.       Office Visit on 06/25/2018   Component Date Value Ref Range Status    WBC 06/25/2018 5.89  3.90 - 12.70 K/uL Final    RBC 06/25/2018 4.15* 4.60 - 6.20 M/uL Final    Hemoglobin 06/25/2018 12.8* 14.0 - 18.0 g/dL Final    Hematocrit 06/25/2018 38.3* 40.0 - 54.0 % Final    MCV 06/25/2018 92  82 - 98 fL Final    MCH 06/25/2018 30.8  27.0 - 31.0 pg Final    MCHC 06/25/2018 33.4  32.0 - 36.0 g/dL Final    RDW 06/25/2018 12.5  11.5 - 14.5 % Final    Platelets 06/25/2018 222  150 - 350 K/uL Final    MPV 06/25/2018 10.9  9.2 - 12.9 fL Final    Immature Granulocytes 06/25/2018 0.2  0.0 - 0.5 % Final    Gran # (ANC) 06/25/2018 3.3  1.8 - 7.7 K/uL Final    Immature Grans (Abs) 06/25/2018 0.01  0.00 - 0.04 K/uL Final    Lymph # 06/25/2018 1.8  1.0 - 4.8 K/uL Final    Mono # 06/25/2018 0.6  0.3 - 1.0 K/uL Final    Eos # 06/25/2018 0.1  0.0 - 0.5 K/uL Final    Baso # 06/25/2018 0.04  0.00 - 0.20 K/uL Final    nRBC 06/25/2018 0  0 /100 WBC Final    Gran% 06/25/2018 56.5  38.0 - 73.0 % Final    Lymph%  06/25/2018 30.2  18.0 - 48.0 % Final    Mono% 06/25/2018 10.0  4.0 - 15.0 % Final    Eosinophil% 06/25/2018 2.4  0.0 - 8.0 % Final    Basophil% 06/25/2018 0.7  0.0 - 1.9 % Final    Differential Method 06/25/2018 Automated   Final    Glucose 06/25/2018 154* 70 - 110 mg/dL Final    Sodium 06/25/2018 143  136 - 145 mmol/L Final    Potassium 06/25/2018 4.8  3.5 - 5.1 mmol/L Final    Chloride 06/25/2018 106  95 - 110 mmol/L Final    CO2 06/25/2018 30* 23 - 29 mmol/L Final    BUN, Bld 06/25/2018 20  10 - 30 mg/dL Final    Calcium 06/25/2018 9.9  8.7 - 10.5 mg/dL Final    Creatinine 06/25/2018 1.0  0.5 - 1.4 mg/dL Final    Albumin 06/25/2018 3.7  3.5 - 5.2 g/dL Final    Phosphorus 06/25/2018 2.7  2.7 - 4.5 mg/dL Final    eGFR if African American 06/25/2018 >60.0  >60 mL/min/1.73 m^2 Final    eGFR if non African American 06/25/2018 >60.0  >60 mL/min/1.73 m^2 Final    Anion Gap 06/25/2018 7* 8 - 16 mmol/L Final     Assessment:       No diagnosis found.    Plan:   Eyes Tylenol donut cushion.  After 3 days start using heat.  Follow-up if needed.      There are no diagnoses linked to this encounter.

## 2018-09-04 ENCOUNTER — PATIENT MESSAGE (OUTPATIENT)
Dept: INTERNAL MEDICINE | Facility: CLINIC | Age: 83
End: 2018-09-04

## 2018-09-20 ENCOUNTER — PATIENT MESSAGE (OUTPATIENT)
Dept: INTERNAL MEDICINE | Facility: CLINIC | Age: 83
End: 2018-09-20

## 2018-09-20 DIAGNOSIS — Z11.1 SCREENING-PULMONARY TB: Primary | ICD-10-CM

## 2018-10-04 ENCOUNTER — PATIENT MESSAGE (OUTPATIENT)
Dept: INTERNAL MEDICINE | Facility: CLINIC | Age: 83
End: 2018-10-04

## 2018-10-04 NOTE — TELEPHONE ENCOUNTER
Spoke to Ester and advised.  Ester stated that he has an appointment on 10/23/2018 but will talk with the family to see if he needs to come sooner.

## 2018-10-18 ENCOUNTER — PATIENT MESSAGE (OUTPATIENT)
Dept: INTERNAL MEDICINE | Facility: CLINIC | Age: 83
End: 2018-10-18

## 2018-10-23 ENCOUNTER — OFFICE VISIT (OUTPATIENT)
Dept: INTERNAL MEDICINE | Facility: CLINIC | Age: 83
End: 2018-10-23
Payer: MEDICARE

## 2018-10-23 VITALS
WEIGHT: 127.13 LBS | HEIGHT: 65 IN | HEART RATE: 59 BPM | DIASTOLIC BLOOD PRESSURE: 64 MMHG | OXYGEN SATURATION: 100 % | TEMPERATURE: 97 F | SYSTOLIC BLOOD PRESSURE: 118 MMHG | BODY MASS INDEX: 21.18 KG/M2
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
## 2018-10-23 DIAGNOSIS — Z02.2 ENCOUNTER FOR EXAMINATION FOR ADMISSION TO ASSISTED LIVING FACILITY: ICD-10-CM

## 2018-10-23 DIAGNOSIS — I10 ESSENTIAL HYPERTENSION: Primary | ICD-10-CM

## 2018-10-23 DIAGNOSIS — G45.9 TIA (TRANSIENT ISCHEMIC ATTACK): ICD-10-CM

## 2018-10-23 LAB
ALBUMIN SERPL BCP-MCNC: 3.7 G/DL
ALP SERPL-CCNC: 59 U/L
ALT SERPL W/O P-5'-P-CCNC: 22 U/L
ANION GAP SERPL CALC-SCNC: 7 MMOL/L
AST SERPL-CCNC: 31 U/L
BASOPHILS # BLD AUTO: 0.03 K/UL
BASOPHILS NFR BLD: 0.5 %
BILIRUB SERPL-MCNC: 0.6 MG/DL
BUN SERPL-MCNC: 22 MG/DL
CALCIUM SERPL-MCNC: 9.6 MG/DL
CHLORIDE SERPL-SCNC: 105 MMOL/L
CHOLEST SERPL-MCNC: 186 MG/DL
CHOLEST/HDLC SERPL: 3.8 {RATIO}
CO2 SERPL-SCNC: 31 MMOL/L
CREAT SERPL-MCNC: 1.1 MG/DL
DIFFERENTIAL METHOD: ABNORMAL
EOSINOPHIL # BLD AUTO: 0.1 K/UL
EOSINOPHIL NFR BLD: 2.1 %
ERYTHROCYTE [DISTWIDTH] IN BLOOD BY AUTOMATED COUNT: 12.9 %
EST. GFR  (AFRICAN AMERICAN): >60 ML/MIN/1.73 M^2
EST. GFR  (NON AFRICAN AMERICAN): 57.2 ML/MIN/1.73 M^2
GLUCOSE SERPL-MCNC: 123 MG/DL
HCT VFR BLD AUTO: 37.4 %
HDLC SERPL-MCNC: 49 MG/DL
HDLC SERPL: 26.3 %
HGB BLD-MCNC: 12.1 G/DL
IMM GRANULOCYTES # BLD AUTO: 0.01 K/UL
IMM GRANULOCYTES NFR BLD AUTO: 0.2 %
LDLC SERPL CALC-MCNC: 118.4 MG/DL
LYMPHOCYTES # BLD AUTO: 1.7 K/UL
LYMPHOCYTES NFR BLD: 29.4 %
MCH RBC QN AUTO: 30.6 PG
MCHC RBC AUTO-ENTMCNC: 32.4 G/DL
MCV RBC AUTO: 95 FL
MONOCYTES # BLD AUTO: 0.5 K/UL
MONOCYTES NFR BLD: 8.4 %
NEUTROPHILS # BLD AUTO: 3.5 K/UL
NEUTROPHILS NFR BLD: 59.4 %
NONHDLC SERPL-MCNC: 137 MG/DL
NRBC BLD-RTO: 0 /100 WBC
PLATELET # BLD AUTO: 236 K/UL
PMV BLD AUTO: 10.7 FL
POTASSIUM SERPL-SCNC: 4 MMOL/L
PROT SERPL-MCNC: 7.2 G/DL
RBC # BLD AUTO: 3.95 M/UL
SODIUM SERPL-SCNC: 143 MMOL/L
TRIGL SERPL-MCNC: 93 MG/DL
WBC # BLD AUTO: 5.81 K/UL

## 2018-10-23 PROCEDURE — 99214 OFFICE O/P EST MOD 30 MIN: CPT | Mod: S$PBB,,, | Performed by: FAMILY MEDICINE

## 2018-10-23 PROCEDURE — 99999 PR PBB SHADOW E&M-EST. PATIENT-LVL IV: CPT | Mod: PBBFAC,,, | Performed by: FAMILY MEDICINE

## 2018-10-23 PROCEDURE — 80061 LIPID PANEL: CPT

## 2018-10-23 PROCEDURE — 99214 OFFICE O/P EST MOD 30 MIN: CPT | Mod: PBBFAC,PO | Performed by: FAMILY MEDICINE

## 2018-10-23 PROCEDURE — 80053 COMPREHEN METABOLIC PANEL: CPT

## 2018-10-23 PROCEDURE — 85025 COMPLETE CBC W/AUTO DIFF WBC: CPT

## 2018-10-23 NOTE — PROGRESS NOTES
Subjective:       Patient ID: Coy López is a 89 y.o. male.    Chief Complaint: PPD Reading and Leg Swelling (and pain right leg)    Follow-up hypertension  status post TIA falls degenerative joint disease. He denies headache chest pain palpitations shortness of breath or edema. He has had no further TIA symptoms.  He reports he has not fallen since last visit.  He is using a platform walker.  He needs assist Living forms upgraded.  Also needs a PPD skin test.  Home blood pressure monitor was reviewed.  On occasion systolic pressures in the 190 range.      Review of Systems   Constitutional: Negative for activity change, appetite change, fatigue and unexpected weight change.   Respiratory: Negative for cough, chest tightness, shortness of breath and wheezing.    Cardiovascular: Negative for chest pain, palpitations and leg swelling.        Denies lightheadedness syncope   Gastrointestinal: Negative for abdominal pain.   Genitourinary: Negative for difficulty urinating.   Musculoskeletal: Positive for arthralgias.        Stable degenerative joint disease of the hip   Neurological: Positive for weakness. Negative for syncope, facial asymmetry, speech difficulty, light-headedness and headaches.        Generalized weakness.  Stable.  He is not interested in home physical therapy.  Encourage increased walking.       Objective:      Physical Exam   Constitutional: He is oriented to person, place, and time. He appears well-developed and well-nourished. No distress.   Neck: Neck supple. No thyromegaly present.   Cardiovascular: Normal rate, regular rhythm and normal heart sounds.   No murmur heard.  Pulmonary/Chest: Effort normal and breath sounds normal. No respiratory distress. He has no wheezes.   Abdominal: Soft. Bowel sounds are normal. He exhibits no mass. There is no tenderness.   Lymphadenopathy:     He has no cervical adenopathy.   Neurological: He is alert and oriented to person, place, and time.   Skin:  He is not diaphoretic.   Psychiatric: He has a normal mood and affect. His behavior is normal. Judgment and thought content normal.       Office Visit on 06/25/2018   Component Date Value Ref Range Status    WBC 06/25/2018 5.89  3.90 - 12.70 K/uL Final    RBC 06/25/2018 4.15* 4.60 - 6.20 M/uL Final    Hemoglobin 06/25/2018 12.8* 14.0 - 18.0 g/dL Final    Hematocrit 06/25/2018 38.3* 40.0 - 54.0 % Final    MCV 06/25/2018 92  82 - 98 fL Final    MCH 06/25/2018 30.8  27.0 - 31.0 pg Final    MCHC 06/25/2018 33.4  32.0 - 36.0 g/dL Final    RDW 06/25/2018 12.5  11.5 - 14.5 % Final    Platelets 06/25/2018 222  150 - 350 K/uL Final    MPV 06/25/2018 10.9  9.2 - 12.9 fL Final    Immature Granulocytes 06/25/2018 0.2  0.0 - 0.5 % Final    Gran # (ANC) 06/25/2018 3.3  1.8 - 7.7 K/uL Final    Immature Grans (Abs) 06/25/2018 0.01  0.00 - 0.04 K/uL Final    Lymph # 06/25/2018 1.8  1.0 - 4.8 K/uL Final    Mono # 06/25/2018 0.6  0.3 - 1.0 K/uL Final    Eos # 06/25/2018 0.1  0.0 - 0.5 K/uL Final    Baso # 06/25/2018 0.04  0.00 - 0.20 K/uL Final    nRBC 06/25/2018 0  0 /100 WBC Final    Gran% 06/25/2018 56.5  38.0 - 73.0 % Final    Lymph% 06/25/2018 30.2  18.0 - 48.0 % Final    Mono% 06/25/2018 10.0  4.0 - 15.0 % Final    Eosinophil% 06/25/2018 2.4  0.0 - 8.0 % Final    Basophil% 06/25/2018 0.7  0.0 - 1.9 % Final    Differential Method 06/25/2018 Automated   Final    Glucose 06/25/2018 154* 70 - 110 mg/dL Final    Sodium 06/25/2018 143  136 - 145 mmol/L Final    Potassium 06/25/2018 4.8  3.5 - 5.1 mmol/L Final    Chloride 06/25/2018 106  95 - 110 mmol/L Final    CO2 06/25/2018 30* 23 - 29 mmol/L Final    BUN, Bld 06/25/2018 20  10 - 30 mg/dL Final    Calcium 06/25/2018 9.9  8.7 - 10.5 mg/dL Final    Creatinine 06/25/2018 1.0  0.5 - 1.4 mg/dL Final    Albumin 06/25/2018 3.7  3.5 - 5.2 g/dL Final    Phosphorus 06/25/2018 2.7  2.7 - 4.5 mg/dL Final    eGFR if African American 06/25/2018 >60.0  >60  mL/min/1.73 m^2 Final    eGFR if non African American 06/25/2018 >60.0  >60 mL/min/1.73 m^2 Final    Anion Gap 06/25/2018 7* 8 - 16 mmol/L Final     Assessment:       1. Essential hypertension        Plan:     Blood pressure noted. Discussed taken additional 1/2 dose of blood pressure medicine if systolic blood pressure 170 more.  Lab was ordered.  He declined any immunizations.  Assisted-living form was updated.  [place]  directive was completed with patient.  He is not interested in home physical therapy for weakness.  Follow-up in 3 months.    Essential hypertension  -     CBC auto differential  -     Comprehensive metabolic panel  -     Lipid panel    Other orders  -     tuberculin 5 tub. unit /0.1 mL injection; Inject 0.1 mLs (5 Units total) into the skin once. for 1 dose  Dispense: 0.1 mL; Refill: 0

## 2018-10-24 ENCOUNTER — PATIENT MESSAGE (OUTPATIENT)
Dept: INTERNAL MEDICINE | Facility: CLINIC | Age: 83
End: 2018-10-24

## 2018-10-25 ENCOUNTER — PATIENT MESSAGE (OUTPATIENT)
Dept: INTERNAL MEDICINE | Facility: CLINIC | Age: 83
End: 2018-10-25

## 2018-10-26 ENCOUNTER — TELEPHONE (OUTPATIENT)
Dept: INTERNAL MEDICINE | Facility: CLINIC | Age: 83
End: 2018-10-26

## 2018-10-26 NOTE — TELEPHONE ENCOUNTER
Informed that the injection from the pharmacy is the TB skin test he has already had done. Informed them to dont go get it. Verbalized understanding.

## 2018-10-26 NOTE — TELEPHONE ENCOUNTER
----- Message from Fanta Alex sent at 10/26/2018 12:25 PM CDT -----  Contact: bailey carlton daughter  Caller would like nurse to contact regarding a mixup with pharmacy. Please contact daughter at [phone]

## 2018-12-21 ENCOUNTER — PATIENT MESSAGE (OUTPATIENT)
Dept: INTERNAL MEDICINE | Facility: CLINIC | Age: 83
End: 2018-12-21

## 2018-12-21 NOTE — TELEPHONE ENCOUNTER
Patient daughter Skyler sent over a My Chart message stating that her dads Benazepril-Hydrochlorthiazide 20-12.5 mg will no longer be covered on his formulary list for 2019. She states that he has plenty of this medication left, but will need another Rx that is covered on his formulary list for 2019. Patient daughter will be informed that Dr. Boyd will be out of the office for two weeks, but he will be made aware of the information.

## 2018-12-31 ENCOUNTER — EXTERNAL CHRONIC CARE MANAGEMENT (OUTPATIENT)
Dept: PRIMARY CARE CLINIC | Facility: CLINIC | Age: 83
End: 2018-12-31
Payer: MEDICARE

## 2018-12-31 PROCEDURE — 99490 PR CHRONIC CARE MGMT, 1ST 20 MIN: ICD-10-PCS | Mod: S$PBB,,, | Performed by: FAMILY MEDICINE

## 2018-12-31 PROCEDURE — 99490 CHRNC CARE MGMT STAFF 1ST 20: CPT | Mod: S$PBB,,, | Performed by: FAMILY MEDICINE

## 2018-12-31 PROCEDURE — 99490 CHRNC CARE MGMT STAFF 1ST 20: CPT | Mod: PBBFAC,PO | Performed by: FAMILY MEDICINE

## 2019-01-08 ENCOUNTER — PATIENT MESSAGE (OUTPATIENT)
Dept: INTERNAL MEDICINE | Facility: CLINIC | Age: 84
End: 2019-01-08

## 2019-01-09 ENCOUNTER — PATIENT MESSAGE (OUTPATIENT)
Dept: INTERNAL MEDICINE | Facility: CLINIC | Age: 84
End: 2019-01-09

## 2019-01-14 ENCOUNTER — OFFICE VISIT (OUTPATIENT)
Dept: INTERNAL MEDICINE | Facility: CLINIC | Age: 84
End: 2019-01-14
Payer: MEDICARE

## 2019-01-14 VITALS
RESPIRATION RATE: 18 BRPM | HEART RATE: 53 BPM | HEIGHT: 65 IN | OXYGEN SATURATION: 99 % | TEMPERATURE: 98 F | BODY MASS INDEX: 22.65 KG/M2 | SYSTOLIC BLOOD PRESSURE: 122 MMHG | WEIGHT: 135.94 LBS | DIASTOLIC BLOOD PRESSURE: 68 MMHG

## 2019-01-14 DIAGNOSIS — Z02.2 ENCOUNTER FOR EXAMINATION FOR ADMISSION TO ASSISTED LIVING FACILITY: ICD-10-CM

## 2019-01-14 DIAGNOSIS — M47.816 SPONDYLOSIS OF LUMBAR REGION WITHOUT MYELOPATHY OR RADICULOPATHY: ICD-10-CM

## 2019-01-14 DIAGNOSIS — N18.30 CHRONIC KIDNEY DISEASE (CKD), STAGE III (MODERATE): ICD-10-CM

## 2019-01-14 DIAGNOSIS — C67.9 MALIGNANT NEOPLASM OF URINARY BLADDER, UNSPECIFIED SITE: ICD-10-CM

## 2019-01-14 DIAGNOSIS — I10 ESSENTIAL HYPERTENSION: Primary | ICD-10-CM

## 2019-01-14 DIAGNOSIS — G45.9 TIA (TRANSIENT ISCHEMIC ATTACK): ICD-10-CM

## 2019-01-14 DIAGNOSIS — I73.9 PAD (PERIPHERAL ARTERY DISEASE): ICD-10-CM

## 2019-01-14 DIAGNOSIS — C61 CANCER OF PROSTATE: ICD-10-CM

## 2019-01-14 PROCEDURE — 99999 PR PBB SHADOW E&M-EST. PATIENT-LVL IV: ICD-10-PCS | Mod: PBBFAC,,, | Performed by: FAMILY MEDICINE

## 2019-01-14 PROCEDURE — 99999 PR PBB SHADOW E&M-EST. PATIENT-LVL IV: CPT | Mod: PBBFAC,,, | Performed by: FAMILY MEDICINE

## 2019-01-14 PROCEDURE — 99214 OFFICE O/P EST MOD 30 MIN: CPT | Mod: PBBFAC,PO,25 | Performed by: FAMILY MEDICINE

## 2019-01-14 PROCEDURE — 99214 PR OFFICE/OUTPT VISIT, EST, LEVL IV, 30-39 MIN: ICD-10-PCS | Mod: S$PBB,,, | Performed by: FAMILY MEDICINE

## 2019-01-14 PROCEDURE — 99214 OFFICE O/P EST MOD 30 MIN: CPT | Mod: S$PBB,,, | Performed by: FAMILY MEDICINE

## 2019-01-14 PROCEDURE — 86580 TB INTRADERMAL TEST: CPT | Mod: PBBFAC,PO

## 2019-01-14 RX ORDER — OLMESARTAN MEDOXOMIL AND HYDROCHLOROTHIAZIDE 20/12.5 20; 12.5 MG/1; MG/1
1 TABLET ORAL DAILY
Qty: 90 TABLET | Refills: 3 | Status: SHIPPED | OUTPATIENT
Start: 2019-01-14 | End: 2019-06-03

## 2019-01-14 NOTE — PROGRESS NOTES
PPD test administered, informed he needed to return 48-72 hours for skin read. Verbalized understanding.

## 2019-01-14 NOTE — PROGRESS NOTES
Subjective:       Patient ID: Coy López is a 89 y.o. male.    Chief Complaint: Immunizations (tb test) and Orders (plan of care)    He is here for completion of papers to assisted living as well as [facility] need for assistance.  The overall he has some weakness.  He has some difficulty ambulating and is often using a Rollator.  He has somewhat been over in his gait while walking.  He denies headache chest pain palpitations shortness of breath edema.      Review of Systems   Constitutional: Positive for activity change and fatigue. Negative for appetite change and unexpected weight change.   Respiratory: Negative for cough, chest tightness, shortness of breath and wheezing.    Cardiovascular: Negative for chest pain, palpitations and leg swelling.   Gastrointestinal: Negative for abdominal pain.   Genitourinary: Negative for difficulty urinating.   Musculoskeletal: Positive for arthralgias.   Neurological: Positive for weakness. Negative for dizziness, light-headedness and headaches.   Psychiatric/Behavioral: Negative for agitation, behavioral problems and confusion.       Objective:      Physical Exam   Constitutional: He is oriented to person, place, and time. He appears well-developed and well-nourished. No distress.   Neck: Neck supple. No thyromegaly present.   Cardiovascular: Normal rate, regular rhythm and normal heart sounds.   No murmur heard.  Pulmonary/Chest: Effort normal and breath sounds normal. No respiratory distress. He has no wheezes.   Abdominal: Soft. Bowel sounds are normal. He exhibits no mass. There is no tenderness.   Lymphadenopathy:     He has no cervical adenopathy.   Neurological: He is alert and oriented to person, place, and time. He exhibits normal muscle tone.   Walks slowly bent over.  Has some difficulty maintaining balance       Office Visit on 10/23/2018   Component Date Value Ref Range Status    WBC 10/23/2018 5.81  3.90 - 12.70 K/uL Final    RBC 10/23/2018 3.95* 4.60 -  6.20 M/uL Final    Hemoglobin 10/23/2018 12.1* 14.0 - 18.0 g/dL Final    Hematocrit 10/23/2018 37.4* 40.0 - 54.0 % Final    MCV 10/23/2018 95  82 - 98 fL Final    MCH 10/23/2018 30.6  27.0 - 31.0 pg Final    MCHC 10/23/2018 32.4  32.0 - 36.0 g/dL Final    RDW 10/23/2018 12.9  11.5 - 14.5 % Final    Platelets 10/23/2018 236  150 - 350 K/uL Final    MPV 10/23/2018 10.7  9.2 - 12.9 fL Final    Immature Granulocytes 10/23/2018 0.2  0.0 - 0.5 % Final    Gran # (ANC) 10/23/2018 3.5  1.8 - 7.7 K/uL Final    Immature Grans (Abs) 10/23/2018 0.01  0.00 - 0.04 K/uL Final    Lymph # 10/23/2018 1.7  1.0 - 4.8 K/uL Final    Mono # 10/23/2018 0.5  0.3 - 1.0 K/uL Final    Eos # 10/23/2018 0.1  0.0 - 0.5 K/uL Final    Baso # 10/23/2018 0.03  0.00 - 0.20 K/uL Final    nRBC 10/23/2018 0  0 /100 WBC Final    Gran% 10/23/2018 59.4  38.0 - 73.0 % Final    Lymph% 10/23/2018 29.4  18.0 - 48.0 % Final    Mono% 10/23/2018 8.4  4.0 - 15.0 % Final    Eosinophil% 10/23/2018 2.1  0.0 - 8.0 % Final    Basophil% 10/23/2018 0.5  0.0 - 1.9 % Final    Differential Method 10/23/2018 Automated   Final    Sodium 10/23/2018 143  136 - 145 mmol/L Final    Potassium 10/23/2018 4.0  3.5 - 5.1 mmol/L Final    Chloride 10/23/2018 105  95 - 110 mmol/L Final    CO2 10/23/2018 31* 23 - 29 mmol/L Final    Glucose 10/23/2018 123* 70 - 110 mg/dL Final    BUN, Bld 10/23/2018 22  10 - 30 mg/dL Final    Creatinine 10/23/2018 1.1  0.5 - 1.4 mg/dL Final    Calcium 10/23/2018 9.6  8.7 - 10.5 mg/dL Final    Total Protein 10/23/2018 7.2  6.0 - 8.4 g/dL Final    Albumin 10/23/2018 3.7  3.5 - 5.2 g/dL Final    Total Bilirubin 10/23/2018 0.6  0.1 - 1.0 mg/dL Final    Alkaline Phosphatase 10/23/2018 59  55 - 135 U/L Final    AST 10/23/2018 31  10 - 40 U/L Final    ALT 10/23/2018 22  10 - 44 U/L Final    Anion Gap 10/23/2018 7* 8 - 16 mmol/L Final    eGFR if African American 10/23/2018 >60.0  >60 mL/min/1.73 m^2 Final    eGFR if non   10/23/2018 57.2* >60 mL/min/1.73 m^2 Final    Cholesterol 10/23/2018 186  120 - 199 mg/dL Final    Triglycerides 10/23/2018 93  30 - 150 mg/dL Final    HDL 10/23/2018 49  40 - 75 mg/dL Final    LDL Cholesterol 10/23/2018 118.4  63.0 - 159.0 mg/dL Final    HDL/Chol Ratio 10/23/2018 26.3  20.0 - 50.0 % Final    Total Cholesterol/HDL Ratio 10/23/2018 3.8  2.0 - 5.0 Final    Non-HDL Cholesterol 10/23/2018 137  mg/dL Final     Assessment:       No diagnosis found.    Plan:   Form were completed for both assisted living as well as [facility] form for assistance in house bound patient.  TB skin test applied.    There are no diagnoses linked to this encounter.

## 2019-01-16 LAB
TB INDURATION - 48 HR READ: NORMAL MM
TB INDURATION - 72 HR READ: NORMAL MM
TB SKIN TEST - 48 HR READ: NEGATIVE
TB SKIN TEST - 72 HR READ: NORMAL

## 2019-01-18 ENCOUNTER — TELEPHONE (OUTPATIENT)
Dept: INTERNAL MEDICINE | Facility: CLINIC | Age: 84
End: 2019-01-18

## 2019-01-18 NOTE — TELEPHONE ENCOUNTER
----- Message from Jennifer Duckworth MA sent at 1/18/2019  4:27 PM CST -----  Contact: Sendy Minaya      ----- Message -----  From: Jordan Loya  Sent: 1/18/2019   3:12 PM  To: Deb BALLARD Staff    She is calling in regards to getting the pt's TB test results,please advise [phone]

## 2019-01-26 ENCOUNTER — OFFICE VISIT (OUTPATIENT)
Dept: URGENT CARE | Facility: CLINIC | Age: 84
End: 2019-01-26
Payer: MEDICARE

## 2019-01-26 ENCOUNTER — NURSE TRIAGE (OUTPATIENT)
Dept: ADMINISTRATIVE | Facility: CLINIC | Age: 84
End: 2019-01-26

## 2019-01-26 VITALS
SYSTOLIC BLOOD PRESSURE: 128 MMHG | HEIGHT: 65 IN | WEIGHT: 136.88 LBS | OXYGEN SATURATION: 99 % | BODY MASS INDEX: 22.81 KG/M2 | RESPIRATION RATE: 18 BRPM | HEART RATE: 60 BPM | TEMPERATURE: 98 F | DIASTOLIC BLOOD PRESSURE: 74 MMHG

## 2019-01-26 DIAGNOSIS — M47.26 OSTEOARTHRITIS OF SPINE WITH RADICULOPATHY, LUMBAR REGION: ICD-10-CM

## 2019-01-26 DIAGNOSIS — R82.90 ABNORMAL URINE: ICD-10-CM

## 2019-01-26 DIAGNOSIS — M54.50 ACUTE LEFT-SIDED LOW BACK PAIN WITHOUT SCIATICA: Primary | ICD-10-CM

## 2019-01-26 LAB
BILIRUB SERPL-MCNC: ABNORMAL MG/DL
BLOOD URINE, POC: ABNORMAL
COLOR, POC UA: YELLOW
GLUCOSE UR QL STRIP: NORMAL
KETONES UR QL STRIP: ABNORMAL
LEUKOCYTE ESTERASE URINE, POC: ABNORMAL
NITRITE, POC UA: ABNORMAL
PH, POC UA: 5
PROTEIN, POC: ABNORMAL
SPECIFIC GRAVITY, POC UA: 1.02
UROBILINOGEN, POC UA: NORMAL

## 2019-01-26 PROCEDURE — 81001 URINALYSIS AUTO W/SCOPE: CPT | Mod: PBBFAC,PN | Performed by: NURSE PRACTITIONER

## 2019-01-26 PROCEDURE — 99999 PR PBB SHADOW E&M-EST. PATIENT-LVL V: CPT | Mod: PBBFAC,,, | Performed by: NURSE PRACTITIONER

## 2019-01-26 PROCEDURE — 99999 PR PBB SHADOW E&M-EST. PATIENT-LVL V: ICD-10-PCS | Mod: PBBFAC,,, | Performed by: NURSE PRACTITIONER

## 2019-01-26 PROCEDURE — 99214 OFFICE O/P EST MOD 30 MIN: CPT | Mod: S$PBB,,, | Performed by: NURSE PRACTITIONER

## 2019-01-26 PROCEDURE — 99214 PR OFFICE/OUTPT VISIT, EST, LEVL IV, 30-39 MIN: ICD-10-PCS | Mod: S$PBB,,, | Performed by: NURSE PRACTITIONER

## 2019-01-26 PROCEDURE — 99215 OFFICE O/P EST HI 40 MIN: CPT | Mod: PBBFAC,PN | Performed by: NURSE PRACTITIONER

## 2019-01-26 PROCEDURE — 87086 URINE CULTURE/COLONY COUNT: CPT

## 2019-01-26 NOTE — TELEPHONE ENCOUNTER
"    Reason for Disposition   [1] Follow-up call to recent contact AND [2] information only call, no triage required    Answer Assessment - Initial Assessment Questions  1. REASON FOR CALL or QUESTION: "What is your reason for calling today?" or "How can I best help you?" or "What question do you have that I can help answer?"      Follow up call to the daughter. Informed daughter that I had spoken to her father and she should take him in to the urgent care for evaluation    Protocols used: ST INFORMATION ONLY CALL-A-AH      "

## 2019-01-26 NOTE — PATIENT INSTRUCTIONS
Back Pain (Acute or Chronic)    Back pain is one of the most common problems. The good news is that most people feel better in 1 to 2 weeks, and most of the rest in 1 to 2 months. Most people can remain active.  People experience and describe pain differently; not everyone is the same.  · The pain can be sharp, stabbing, shooting, aching, cramping or burning.  · Movement, standing, bending, lifting, sitting, or walking may worsen pain.  · It can be localized to one spot or area, or it can be more generalized.  · It can spread or radiate upwards, to the front, or go down your arms or legs (sciatica).  · It can cause muscle spasm.  Most of the time, mechanical problems with the muscles or spine cause the pain. Mechanical problems are usually caused by an injury to the muscles or ligaments. While illness can cause back pain, it is usually not caused by a serious illness. Mechanical problems include:   · Physical activity such as sports, exercise, work, or normal activity  · Overexertion, lifting, pushing, pulling incorrectly or too aggressively  · Sudden twisting, bending, or stretching from an accident, or accidental movement  · Poor posture  · Stretching or moving wrong, without noticing pain at the time  · Poor coordination, lack of regular exercise (check with your doctor about this)  · Spinal disc disease or arthritis  · Stress  Pain can also be related to pregnancy, or illness like appendicitis, bladder or kidney infections, pelvic infections, and many other things.  Acute back pain usually gets better in 1 to 2 weeks. Back pain related to disk disease, arthritis in the spinal joints or spinal stenosis (narrowing of the spinal canal) can become chronic and last for months or years.  Unless you had a physical injury (for example, a car accident or fall) X-rays are usually not needed for the initial evaluation of back pain. If pain continues and does not respond to medical treatment, X-rays and other tests may be  needed.  Home care  Try these home care recommendations:  · When in bed, try to find a position of comfort. A firm mattress is best. Try lying flat on your back with pillows under your knees. You can also try lying on your side with your knees bent up towards your chest and a pillow between your knees.  · At first, do not try to stretch out the sore spots. If there is a strain, it is not like the good soreness you get after exercising without an injury. In this case, stretching may make it worse.  · Avoid prolong sitting, long car rides, or travel. This puts more stress on the lower back than standing or walking.  · During the first 24 to 72 hours after an acute injury or flare up of chronic back pain, apply an ice pack to the painful area for 20 minutes and then remove it for 20 minutes. Do this over a period of 60 to 90 minutes or several times a day. This will reduce swelling and pain. Wrap the ice pack in a thin towel or plastic to protect your skin.  · You can start with ice, then switch to heat. Heat (hot shower, hot bath, or heating pad) reduces pain and works well for muscle spasms. Heat can be applied to the painful area for 20 minutes then remove it for 20 minutes. Do this over a period of 60 to 90 minutes or several times a day. Do not sleep on a heating pad. It can lead to skin burns or tissue damage.  · You can alternate ice and heat therapy. Talk with your doctor about the best treatment for your back pain.  · Therapeutic massage can help relax the back muscles without stretching them.  · Be aware of safe lifting methods and do not lift anything without stretching first.  Medicines  Talk to your doctor before using medicine, especially if you have other medical problems or are taking other medicines.  · You may use over-the-counter medicine as directed on the bottle to control pain, unless another pain medicine was prescribed. If you have chronic conditions like diabetes, liver or kidney disease,  stomach ulcers, or gastrointestinal bleeding, or are taking blood thinners, talk to your doctor before taking any medicine.  · Be careful if you are given a prescription medicines, narcotics, or medicine for muscle spasms. They can cause drowsiness, affect your coordination, reflexes, and judgement. Do not drive or operate heavy machinery.  Follow-up care  Follow up with your healthcare provider, or as advised.   A radiologist will review any X-rays that were taken. Your provide will notify you of any new findings that may affect your care.  Call 911  Call emergency services if any of the following occur:  · Trouble breathing  · Confusion  · Very drowsy or trouble awakening  · Fainting or loss of consciousness  · Rapid or very slow heart rate  · Loss of bowel or bladder control  When to seek medical advice  Call your healthcare provider right away if any of these occur:   · Pain becomes worse or spreads to your legs  · Weakness or numbness in one or both legs  · Numbness in the groin or genital area  Date Last Reviewed: 7/1/2016  © 9017-8667 [employer]. [address] [place] [address], [place] [address]. All rights reserved. This information is not intended as a substitute for professional medical care. Always follow your healthcare professional's instructions.

## 2019-01-26 NOTE — TELEPHONE ENCOUNTER
"    Reason for Disposition   Patient sounds very sick or weak to the triager    Answer Assessment - Initial Assessment Questions  1. LOCATION: "Where does it hurt?" (e.g., left, right)     left  2. ONSET: "When did the pain start?"     Yesterday evening  3. SEVERITY: "How bad is the pain?" (e.g., Scale 1-10; mild, moderate, or severe)    - MILD (1-3): doesn't interfere with normal activities     - MODERATE (4-7): interferes with normal activities or awakens from sleep     - SEVERE (8-10): excruciating pain and patient unable to do normal activities (stays in bed)       5  4. PATTERN: "Does the pain come and go, or is it constant?"   constant  5. CAUSE: "What do you think is causing the pain?"     no  6. OTHER SYMPTOMS:  "Do you have any other symptoms?" (e.g., fever, abdominal pain, vomiting, leg weakness, burning with urination, blood in urine)     no  7. PREGNANCY:  "Is there any chance you are pregnant?" "When was your last menstrual period?"   n/a    Protocols used: ST FLANK PAIN-A-AH    Patient's daughter is on her way to her father's house.  I will call her back when she gets there.  Patient will be advised to go to Urgent Care.  "

## 2019-01-27 DIAGNOSIS — I10 ESSENTIAL HYPERTENSION: ICD-10-CM

## 2019-01-27 LAB — BACTERIA UR CULT: NO GROWTH

## 2019-01-27 NOTE — PROGRESS NOTES
"Subjective:      Patient ID: Coy López is a 89 y.o. male.    Chief Complaint: No chief complaint on file.    Mr. López is here in Urgent Care with his daughter. He complains of left low back pain x 2-3 days. Has some chronic lumbar spine issues, but this pain is a slightly different quality than his typical back pain. He wanted to make sure the pain wasn't a UTI or kidney issue. No dysuria, frequency, incontinence, or hematuria. Urine is slightly more dark than usual and daughter reports some odor. No fever or chills. No recent fall, other trauma, heavy lifting, or activity outside of his usual routine. Pain doesn't radiate. Not worse with pressure on the area, but does worsen with certain movements. No fever or chills.       Review of Systems   Constitutional: Negative for chills and fever.   HENT: Negative.    Respiratory: Negative.    Cardiovascular: Negative.    Gastrointestinal: Negative.    Genitourinary: Positive for frequency. Negative for difficulty urinating, dysuria and hematuria.   Musculoskeletal: Positive for back pain. Negative for gait problem and neck pain.   Skin: Negative.    Neurological: Negative.    Hematological: Negative.        Objective:   /74   Pulse 60   Temp 98.3 °F (36.8 °C)   Resp 18   Ht 5' 5" (1.651 m)   Wt 62.1 kg (136 lb 14.5 oz)   SpO2 99%   BMI 22.78 kg/m²   Physical Exam   Constitutional: He is oriented to person, place, and time. He appears well-developed and well-nourished. No distress.   HENT:   Head: Normocephalic and atraumatic.   Eyes: Conjunctivae are normal.   Neck: Normal range of motion. Neck supple.   Cardiovascular: Normal rate, regular rhythm and normal heart sounds.   Pulmonary/Chest: Effort normal and breath sounds normal. No respiratory distress.   Abdominal: Soft. Bowel sounds are normal.   Musculoskeletal: Normal range of motion.        Back:    Neurological: He is alert and oriented to person, place, and time.   Skin: Skin is warm " and dry. No rash noted. He is not diaphoretic.   Nursing note and vitals reviewed.    Assessment:      1. Acute left-sided low back pain without sciatica    2. Abnormal urine    3. Osteoarthritis of spine with radiculopathy, lumbar region       Plan:   Acute left-sided low back pain without sciatica  -     POCT urinalysis, dipstick or tablet reag    Abnormal urine  -     Urine culture    Osteoarthritis of spine with radiculopathy, lumbar region    Trace WBCs, otherwise, normal urine dipstick. Will follow up via telephone and MyChart (per daughter's request) when culture results are available.  Daughter would like to hold off on any new medications for the back pain at this time as Mr. López is transferring into an assisted living facility currently and any new medications need to go through the facility. Will try Tylenol or Aspirin for now. Also recommended alternating ice and heat. Rest. Gentle stretching to avoid stiffness.  Instructions, follow up, and supportive care as per AVS.  Follow up with PCP if not improving at all in the next 3-4 days, sooner for any new or worsening symptoms.

## 2019-01-28 RX ORDER — BENAZEPRIL HYDROCHLORIDE AND HYDROCHLOROTHIAZIDE 20; 12.5 MG/1; MG/1
2 TABLET ORAL DAILY
Qty: 180 TABLET | Refills: 2 | Status: SHIPPED | OUTPATIENT
Start: 2019-01-28 | End: 2019-02-04

## 2019-01-31 ENCOUNTER — EXTERNAL CHRONIC CARE MANAGEMENT (OUTPATIENT)
Dept: PRIMARY CARE CLINIC | Facility: CLINIC | Age: 84
End: 2019-01-31
Payer: MEDICARE

## 2019-01-31 PROCEDURE — 99490 CHRNC CARE MGMT STAFF 1ST 20: CPT | Mod: PBBFAC,PO | Performed by: FAMILY MEDICINE

## 2019-01-31 PROCEDURE — 99490 PR CHRONIC CARE MGMT, 1ST 20 MIN: ICD-10-PCS | Mod: S$PBB,,, | Performed by: FAMILY MEDICINE

## 2019-01-31 PROCEDURE — 99490 CHRNC CARE MGMT STAFF 1ST 20: CPT | Mod: S$PBB,,, | Performed by: FAMILY MEDICINE

## 2019-02-01 ENCOUNTER — HOSPITAL ENCOUNTER (EMERGENCY)
Facility: HOSPITAL | Age: 84
Discharge: HOME | End: 2019-02-02
Attending: EMERGENCY MEDICINE
Payer: MEDICARE

## 2019-02-01 ENCOUNTER — PATIENT MESSAGE (OUTPATIENT)
Dept: INTERNAL MEDICINE | Facility: CLINIC | Age: 84
End: 2019-02-01
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
## 2019-02-01 DIAGNOSIS — R55 SYNCOPE: ICD-10-CM

## 2019-02-01 DIAGNOSIS — S09.90XA CLOSED HEAD INJURY, INITIAL ENCOUNTER: ICD-10-CM

## 2019-02-01 DIAGNOSIS — M25.552 ACUTE HIP PAIN, LEFT: Primary | ICD-10-CM

## 2019-02-01 LAB
ALBUMIN SERPL BCP-MCNC: 3.7 G/DL
ALP SERPL-CCNC: 62 U/L
ALT SERPL W/O P-5'-P-CCNC: 16 U/L
ANION GAP SERPL CALC-SCNC: 8 MMOL/L
APTT BLDCRRT: 27.6 SEC
AST SERPL-CCNC: 23 U/L
BASOPHILS # BLD AUTO: 0.02 K/UL
BASOPHILS NFR BLD: 0.2 %
BILIRUB SERPL-MCNC: 0.3 MG/DL
BILIRUB UR QL STRIP: NEGATIVE
BUN SERPL-MCNC: 24 MG/DL
CALCIUM SERPL-MCNC: 9.9 MG/DL
CHLORIDE SERPL-SCNC: 104 MMOL/L
CLARITY UR: CLEAR
CO2 SERPL-SCNC: 28 MMOL/L
COLOR UR: YELLOW
CREAT SERPL-MCNC: 1 MG/DL
DIFFERENTIAL METHOD: ABNORMAL
EOSINOPHIL # BLD AUTO: 0.2 K/UL
EOSINOPHIL NFR BLD: 2.2 %
ERYTHROCYTE [DISTWIDTH] IN BLOOD BY AUTOMATED COUNT: 12.9 %
EST. GFR  (AFRICAN AMERICAN): >60 ML/MIN/1.73 M^2
EST. GFR  (NON AFRICAN AMERICAN): >60 ML/MIN/1.73 M^2
GLUCOSE SERPL-MCNC: 95 MG/DL
GLUCOSE UR QL STRIP: NEGATIVE
HCT VFR BLD AUTO: 35.4 %
HGB BLD-MCNC: 12.2 G/DL
HGB UR QL STRIP: NEGATIVE
INR PPP: 0.9
KETONES UR QL STRIP: NEGATIVE
LEUKOCYTE ESTERASE UR QL STRIP: NEGATIVE
LYMPHOCYTES # BLD AUTO: 2.5 K/UL
LYMPHOCYTES NFR BLD: 30.2 %
MCH RBC QN AUTO: 31 PG
MCHC RBC AUTO-ENTMCNC: 34.5 G/DL
MCV RBC AUTO: 90 FL
MONOCYTES # BLD AUTO: 1.1 K/UL
MONOCYTES NFR BLD: 13.6 %
NEUTROPHILS # BLD AUTO: 4.5 K/UL
NEUTROPHILS NFR BLD: 53.8 %
NITRITE UR QL STRIP: NEGATIVE
PH UR STRIP: 6 [PH] (ref 5–8)
PLATELET # BLD AUTO: 199 K/UL
PMV BLD AUTO: 9.5 FL
POTASSIUM SERPL-SCNC: 3.9 MMOL/L
PROT SERPL-MCNC: 7.2 G/DL
PROT UR QL STRIP: NEGATIVE
PROTHROMBIN TIME: 10.3 SEC
RBC # BLD AUTO: 3.94 M/UL
SODIUM SERPL-SCNC: 140 MMOL/L
SP GR UR STRIP: 1.01 (ref 1–1.03)
TROPONIN I SERPL DL<=0.01 NG/ML-MCNC: 0.01 NG/ML
URN SPEC COLLECT METH UR: NORMAL
UROBILINOGEN UR STRIP-ACNC: NEGATIVE EU/DL
WBC # BLD AUTO: 8.37 K/UL

## 2019-02-01 PROCEDURE — 85025 COMPLETE CBC W/AUTO DIFF WBC: CPT

## 2019-02-01 PROCEDURE — 85730 THROMBOPLASTIN TIME PARTIAL: CPT

## 2019-02-01 PROCEDURE — 93010 ELECTROCARDIOGRAM REPORT: CPT | Mod: ,,, | Performed by: INTERNAL MEDICINE

## 2019-02-01 PROCEDURE — 99285 EMERGENCY DEPT VISIT HI MDM: CPT | Mod: 25

## 2019-02-01 PROCEDURE — 93010 EKG 12-LEAD: ICD-10-PCS | Mod: ,,, | Performed by: INTERNAL MEDICINE

## 2019-02-01 PROCEDURE — 81003 URINALYSIS AUTO W/O SCOPE: CPT

## 2019-02-01 PROCEDURE — 96375 TX/PRO/DX INJ NEW DRUG ADDON: CPT

## 2019-02-01 PROCEDURE — 63600175 PHARM REV CODE 636 W HCPCS: Performed by: EMERGENCY MEDICINE

## 2019-02-01 PROCEDURE — 85610 PROTHROMBIN TIME: CPT

## 2019-02-01 PROCEDURE — 84484 ASSAY OF TROPONIN QUANT: CPT

## 2019-02-01 PROCEDURE — 93005 ELECTROCARDIOGRAM TRACING: CPT

## 2019-02-01 PROCEDURE — 80053 COMPREHEN METABOLIC PANEL: CPT

## 2019-02-01 PROCEDURE — 96374 THER/PROPH/DIAG INJ IV PUSH: CPT

## 2019-02-01 PROCEDURE — 36415 COLL VENOUS BLD VENIPUNCTURE: CPT

## 2019-02-01 RX ORDER — MORPHINE SULFATE 10 MG/ML
4 INJECTION INTRAMUSCULAR; INTRAVENOUS; SUBCUTANEOUS
Status: COMPLETED | OUTPATIENT
Start: 2019-02-01 | End: 2019-02-01

## 2019-02-01 RX ORDER — ONDANSETRON 2 MG/ML
4 INJECTION INTRAMUSCULAR; INTRAVENOUS
Status: COMPLETED | OUTPATIENT
Start: 2019-02-01 | End: 2019-02-01

## 2019-02-01 RX ADMIN — MORPHINE SULFATE 4 MG: 10 INJECTION INTRAVENOUS at 10:02

## 2019-02-01 RX ADMIN — ONDANSETRON 4 MG: 2 INJECTION INTRAMUSCULAR; INTRAVENOUS at 10:02

## 2019-02-02 VITALS
HEIGHT: 65 IN | OXYGEN SATURATION: 100 % | RESPIRATION RATE: 16 BRPM | HEART RATE: 66 BPM | BODY MASS INDEX: 24.99 KG/M2 | TEMPERATURE: 98 F | DIASTOLIC BLOOD PRESSURE: 84 MMHG | WEIGHT: 150 LBS | SYSTOLIC BLOOD PRESSURE: 158 MMHG

## 2019-02-02 NOTE — ED PROVIDER NOTES
"SCRIBE #1 NOTE: I, Prerna Anderson, am scribing for, and in the presence of, Coy Medina MD. I have scribed the entire note.       History     Chief Complaint   Patient presents with    Fall     pt was transferring to chair and hit his head on the floor. Pt states that he "blacked out" causing fall.    Hypertension     sent from [facility] for HTN of 207/85     Review of patient's allergies indicates:   Allergen Reactions    Baycol [cerivastatin]     Codeine     Norvasc [amlodipine]     Sular [nisoldipine]          History of Present Illness     HPI    2/1/2019, 10:00 PM  History obtained from the daughter and patient      History of Present Illness: Coy López is a 89 y.o. male patient with a PMHx of bladder cancer, CAD, HLD, HTN, renal failure, and TIA who presents to the Emergency Department for evaluation of fall which onset suddenly just PTA. Daughter states she was with pt at assisted living facility. Daughter reports pt was walking with his roller to sit down and had an episode of syncope. Pt landed on his hip and hit is head on the floor. Symptoms are constant and moderate in severity. No mitigating or exacerbating factors reported. Associated sxs include L hip pain and head pain. Patient denies any fever, chills, numbness, weakness, dizziness, back pain, neck pain, knee pain, abd pain, CP, SOB, and all other sxs at this time. No prior tx reported. It is noted pt was seen at [facility] and sent to ED for HTN of 207/85. and No further complaints or concerns at this time.       Arrival mode: Personal vehicle     PCP: Roscoe Boyd MD        Past Medical History:  Past Medical History:   Diagnosis Date    Bladder cancer 2009    Coronary artery disease     Degenerative arthritis of lumbar spine     Hyperlipidemia     Hypertension     LBP (low back pain)     Prostate CA 1994    Renal failure 2006    Right leg pain     TIA (transient ischemic attack) 1980       Past " Surgical History:  Past Surgical History:   Procedure Laterality Date    ADENOIDECTOMY      bladder cancer  2009    FOOT SURGERY      PROSTATE SURGERY  1994    TONSILLECTOMY           Family History:  Family History   Problem Relation Age of Onset    Kidney failure Mother         POST OP GB    Stroke Father     Diabetes Sister     Hypertension Brother     Heart attack Brother     Nephrolithiasis Son        Social History:  Social History     Tobacco Use    Smoking status: Former Smoker     Years: 10.00     Last attempt to quit: 1961     Years since quittin.0    Smokeless tobacco: Never Used   Substance and Sexual Activity    Alcohol use: No     Alcohol/week: 0.0 oz    Drug use: No    Sexual activity: Not Currently        Review of Systems     Review of Systems   Constitutional: Negative for chills and fever.        (+) fall   HENT: Negative for sore throat.    Respiratory: Negative for shortness of breath.    Cardiovascular: Negative for chest pain.        (+) HTN   Gastrointestinal: Negative for abdominal pain and nausea.   Genitourinary: Negative for dysuria.   Musculoskeletal: Negative for back pain and neck pain.        (+) L hip pain  (-) knee pain   Skin: Negative for rash.   Neurological: Positive for syncope. Negative for dizziness, weakness and numbness.        (+) head injury   (+) head pain   Hematological: Does not bruise/bleed easily.   All other systems reviewed and are negative.     Physical Exam     Initial Vitals [19]   BP Pulse Resp Temp SpO2   (!) 165/82 65 20 98.2 °F (36.8 °C) 99 %      MAP       --          Physical Exam  Nursing Notes and Vital Signs Reviewed.  Constitutional: Patient is in no acute distress. Well-developed and well-nourished. Elderly.   Head: Atraumatic. Normocephalic.  Eyes: PERRL. EOM intact. Conjunctivae are not pale. No scleral icterus.  ENT: Mucous membranes are moist. Oropharynx is clear and symmetric.    Neck: Supple. Full ROM. No  "lymphadenopathy.  Cardiovascular: Regular rate. Regular rhythm. No murmurs, rubs, or gallops. Distal pulses are 2+ and symmetric.  Pulmonary/Chest: No respiratory distress. Clear to auscultation bilaterally. No wheezing or rales.  Abdominal: Soft and non-distended.  There is no tenderness.  No rebound, guarding, or rigidity. Good bowel sounds.  Genitourinary: No CVA tenderness  Musculoskeletal: Moves all extremities. No obvious deformities. No edema. No calf tenderness. L hip tenderness.   Skin: Warm and dry.  Neurological:  Alert, awake, and appropriate.  Normal speech.  No acute focal neurological deficits are appreciated.  Psychiatric: Normal affect. Good eye contact. Appropriate in content.     ED Course   Procedures  ED Vital Signs:  Vitals:    02/01/19 2051 02/01/19 2132 02/01/19 2223   BP: (!) 165/82  (!) 200/88   Pulse: 65  63   Resp: 20  12   Temp: 98.2 °F (36.8 °C)     TempSrc: Oral     SpO2: 99%  100%   Weight:  68 kg (150 lb)    Height: 5' 5" (1.651 m)         Abnormal Lab Results:  Labs Reviewed   CBC W/ AUTO DIFFERENTIAL - Abnormal; Notable for the following components:       Result Value    RBC 3.94 (*)     Hemoglobin 12.2 (*)     Hematocrit 35.4 (*)     Mono # 1.1 (*)     All other components within normal limits   TROPONIN I   COMPREHENSIVE METABOLIC PANEL   PROTIME-INR   APTT   URINALYSIS, REFLEX TO URINE CULTURE    Narrative:     Preferred Collection Type->Urine, Clean Catch        All Lab Results:  Results for orders placed or performed during the hospital encounter of 02/01/19   Troponin I   Result Value Ref Range    Troponin I 0.006 0.000 - 0.026 ng/mL   Comprehensive metabolic panel   Result Value Ref Range    Sodium 140 136 - 145 mmol/L    Potassium 3.9 3.5 - 5.1 mmol/L    Chloride 104 95 - 110 mmol/L    CO2 28 23 - 29 mmol/L    Glucose 95 70 - 110 mg/dL    BUN, Bld 24 10 - 30 mg/dL    Creatinine 1.0 0.5 - 1.4 mg/dL    Calcium 9.9 8.7 - 10.5 mg/dL    Total Protein 7.2 6.0 - 8.4 g/dL    " Albumin 3.7 3.5 - 5.2 g/dL    Total Bilirubin 0.3 0.1 - 1.0 mg/dL    Alkaline Phosphatase 62 55 - 135 U/L    AST 23 10 - 40 U/L    ALT 16 10 - 44 U/L    Anion Gap 8 8 - 16 mmol/L    eGFR if African American >60 >60 mL/min/1.73 m^2    eGFR if non African American >60 >60 mL/min/1.73 m^2   CBC auto differential   Result Value Ref Range    WBC 8.37 3.90 - 12.70 K/uL    RBC 3.94 (L) 4.60 - 6.20 M/uL    Hemoglobin 12.2 (L) 14.0 - 18.0 g/dL    Hematocrit 35.4 (L) 40.0 - 54.0 %    MCV 90 82 - 98 fL    MCH 31.0 27.0 - 31.0 pg    MCHC 34.5 32.0 - 36.0 g/dL    RDW 12.9 11.5 - 14.5 %    Platelets 199 150 - 350 K/uL    MPV 9.5 9.2 - 12.9 fL    Gran # (ANC) 4.5 1.8 - 7.7 K/uL    Lymph # 2.5 1.0 - 4.8 K/uL    Mono # 1.1 (H) 0.3 - 1.0 K/uL    Eos # 0.2 0.0 - 0.5 K/uL    Baso # 0.02 0.00 - 0.20 K/uL    Gran% 53.8 38.0 - 73.0 %    Lymph% 30.2 18.0 - 48.0 %    Mono% 13.6 4.0 - 15.0 %    Eosinophil% 2.2 0.0 - 8.0 %    Basophil% 0.2 0.0 - 1.9 %    Differential Method Automated    Protime-INR   Result Value Ref Range    Prothrombin Time 10.3 9.0 - 12.5 sec    INR 0.9 0.8 - 1.2   APTT   Result Value Ref Range    aPTT 27.6 21.0 - 32.0 sec   Urinalysis, Reflex to Urine Culture Urine, Clean Catch   Result Value Ref Range    Specimen UA Urine, Clean Catch     Color, UA Yellow Yellow, Straw, Latoya    Appearance, UA Clear Clear    pH, UA 6.0 5.0 - 8.0    Specific Gravity, UA 1.015 1.005 - 1.030    Protein, UA Negative Negative    Glucose, UA Negative Negative    Ketones, UA Negative Negative    Bilirubin (UA) Negative Negative    Occult Blood UA Negative Negative    Nitrite, UA Negative Negative    Urobilinogen, UA Negative <2.0 EU/dL    Leukocytes, UA Negative Negative         Imaging Results:  Imaging Results          CT Head Without Contrast (Final result)  Result time 02/01/19 23:10:56    Final result by Araceli Hernandez MD (02/01/19 23:10:56)                 Impression:      No CT evidence of acute intracranial abnormality.    All  CT scans at this facility use dose modulation, iterative reconstruction and/or weight based dosing when appropriate to reduce radiation dose to as low as reasonably achievable.      Electronically signed by: Araceli Hernandez MD  Date:    02/01/2019  Time:    23:10             Narrative:    EXAMINATION:  CT HEAD WITHOUT CONTRAST    CLINICAL HISTORY:  fall;    TECHNIQUE:  Axial CT imaging was performed through the head without intravenous contrast.    COMPARISON:  None    FINDINGS:  No hydrocephalus, midline shift, mass effect, or acute intracranial hemorrhage. Cortical sulcal pattern and gray-white matter differentiation is normal. Basilar cisterns and posterior fossa are normal. The visualized paranasal sinuses and mastoid air cells are clear. The skull is intact.                               CT Cervical Spine Without Contrast (In process)                CT Hip Without Contrast Left (Final result)  Result time 02/01/19 23:23:44    Final result by Araceli Hernandez MD (02/01/19 23:23:44)                 Impression:      No fracture or aggressive appearing osseous lesion.    All CT scans at this facility use dose modulation, iterative reconstruction and/or weight based dosing when appropriate to reduce radiation dose to as low as reasonably achievable.      Electronically signed by: Araceli Hernandez MD  Date:    02/01/2019  Time:    23:23             Narrative:    EXAMINATION:  CT HIP WITHOUT CONTRAST LEFT    CLINICAL HISTORY:  severe left hip pain;    TECHNIQUE:  Noncontrast axial helical images were obtained throughout the left hip.  Coronal and sagittal reformatted images were created.    COMPARISON:  None    FINDINGS:  There are no acute fractures or aggressive appearing osseous lesions.  Visualized intrapelvic structures are within normal limits.  Mild osseous degenerative changes are noted.                               X-Ray Chest 1 View (Final result)  Result time 02/01/19 22:57:01    Final  result by Araceli Hernandez MD (02/01/19 22:57:01)                 Impression:      No cardiopulmonary process noted.      Electronically signed by: Araceli Hernandez MD  Date:    02/01/2019  Time:    22:57             Narrative:    EXAMINATION:  XR CHEST 1 VIEW    CLINICAL HISTORY:  Unspecified fall, initial encounter    COMPARISON:  August 30, 2015    FINDINGS:  No infiltrate or effusion identified.  Cardiomediastinal silhouette is within normal limits.                               X-Ray Hip 2 View Left (Final result)  Result time 02/01/19 22:08:03    Final result by Araceli Hernandez MD (02/01/19 22:08:03)                 Impression:      As above      Electronically signed by: Araceli Hernandez MD  Date:    02/01/2019  Time:    22:08             Narrative:    EXAMINATION:  XR HIP 2 VIEW LEFT    CLINICAL HISTORY:  left hip pain after a fall;    TECHNIQUE:  AP view of the pelvis and frog leg lateral view of the left hip were performed.    COMPARISON:  None    FINDINGS:  There is no evidence to suggest acute fracture or dislocation.  The hip joint space is relatively well preserved.  No plain film evidence to suggest AVN.                               1:00 AM: Per ED physician, pt's CT cervical spine without intravenous contract results: No acute fracture.    The EKG was ordered, reviewed, and independently interpreted by the ED provider.  Interpretation time: 2106  Rate: 67 BPM  Rhythm: normal sinus rhythm  Interpretation: No acute ST changes. No STEMI.  When compared to EKG performed 30-AUG-2015 20:36, T wave amplitude has increased in Anterior-lateral leads.           The Emergency Provider reviewed the vital signs and test results, which are outlined above.     ED Discussion     1:24 AM: Reassessed pt at this time.  Pt states his condition has improved at this time. Pt is able to stand and walk with assistance. Discussed with pt all pertinent ED information and results. Discussed pt dx  and plan of tx. Gave pt all f/u and return to the ED instructions. All questions and concerns were addressed at this time. Pt expresses understanding of information and instructions, and is comfortable with plan to discharge. Pt is stable for discharge.    I discussed with patient and/or family/caretaker that negative X-ray does not rule out occult fracture or other soft tissue injury.  Persistent pain greater than 7-10 days or increased pain requires follow up, specifically with orthopedics.     I discussed with patient and/or family/caretaker that evaluation in the ED does not suggest any emergent or life threatening medical conditions requiring immediate intervention beyond what was provided in the ED, and I believe patient is safe for discharge.  Regardless, an unremarkable evaluation in the ED does not preclude the development or presence of a serious of life threatening condition. As such, patient was instructed to return immediately for any worsening or change in current symptoms.      ED Medication(s):  Medications   morphine injection 4 mg (4 mg Intravenous Given 2/1/19 2200)   ondansetron injection 4 mg (4 mg Intravenous Given 2/1/19 2201)       New Prescriptions    No medications on file       Follow-up Information     Roscoe Boyd MD. Schedule an appointment as soon as possible for a visit in 3 days.    Specialty:  Family Medicine  Why:  to follow-up on today's visit  Contact information:  [address] [address] [address] [place] [address] [phone]             Go to  [facility]    Specialty:  Emergency Medicine  Why:  As needed, If symptoms worsen  Contact information:  [address] [facility] [address] [phone]                  Medical Decision Making:   Clinical Tests:   Lab Tests: Reviewed and Ordered  Radiological Study: Ordered and Reviewed  Medical Tests: Ordered and Reviewed             Scribe Attestation:   Scribe #1: I performed the above scribed  service and the documentation accurately describes the services I performed. I attest to the accuracy of the note.     Attending:   Physician Attestation Statement for Scribe #1: I, Coy Medina MD, personally performed the services described in this documentation, as scribed by Prerna Anderson, in my presence, and it is both accurate and complete.           Clinical Impression       ICD-10-CM ICD-9-CM   1. Acute hip pain, left M25.552 719.45   2. Syncope R55 780.2   3. Fall W19.XXXA E888.9   4. Closed head injury, initial encounter S09.90XA 959.01       Disposition:   Disposition: Discharged  Condition: Stable         Coy Medina MD  02/02/19 0133

## 2019-02-04 ENCOUNTER — OFFICE VISIT (OUTPATIENT)
Dept: INTERNAL MEDICINE | Facility: CLINIC | Age: 84
End: 2019-02-04
Payer: MEDICARE

## 2019-02-04 VITALS — SYSTOLIC BLOOD PRESSURE: 162 MMHG | DIASTOLIC BLOOD PRESSURE: 62 MMHG | OXYGEN SATURATION: 98 % | HEART RATE: 64 BPM

## 2019-02-04 DIAGNOSIS — R55 SYNCOPE, UNSPECIFIED SYNCOPE TYPE: ICD-10-CM

## 2019-02-04 DIAGNOSIS — R53.1 WEAKNESS: ICD-10-CM

## 2019-02-04 DIAGNOSIS — N18.30 CHRONIC KIDNEY DISEASE (CKD), STAGE III (MODERATE): ICD-10-CM
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
## 2019-02-04 DIAGNOSIS — M47.816 SPONDYLOSIS OF LUMBAR REGION WITHOUT MYELOPATHY OR RADICULOPATHY: ICD-10-CM

## 2019-02-04 DIAGNOSIS — I10 ESSENTIAL HYPERTENSION: ICD-10-CM

## 2019-02-04 PROCEDURE — 99999 PR PBB SHADOW E&M-EST. PATIENT-LVL III: ICD-10-PCS | Mod: PBBFAC,,, | Performed by: FAMILY MEDICINE

## 2019-02-04 PROCEDURE — 99214 PR OFFICE/OUTPT VISIT, EST, LEVL IV, 30-39 MIN: ICD-10-PCS | Mod: S$PBB,,, | Performed by: FAMILY MEDICINE

## 2019-02-04 PROCEDURE — 99999 PR PBB SHADOW E&M-EST. PATIENT-LVL III: CPT | Mod: PBBFAC,,, | Performed by: FAMILY MEDICINE

## 2019-02-04 PROCEDURE — 99214 OFFICE O/P EST MOD 30 MIN: CPT | Mod: S$PBB,,, | Performed by: FAMILY MEDICINE

## 2019-02-04 PROCEDURE — 99213 OFFICE O/P EST LOW 20 MIN: CPT | Mod: PBBFAC,PO | Performed by: FAMILY MEDICINE

## 2019-02-04 RX ORDER — BENAZEPRIL HYDROCHLORIDE AND HYDROCHLOROTHIAZIDE 20; 12.5 MG/1; MG/1
TABLET ORAL
Qty: 90 TABLET | Refills: 1 | Status: SHIPPED | OUTPATIENT
Start: 2019-02-04 | End: 2019-03-06

## 2019-02-05 PROBLEM — R55 SYNCOPE: Status: ACTIVE | Noted: 2019-02-05

## 2019-02-05 NOTE — PROGRESS NOTES
Subjective:       Patient ID: Coy López is a 89 y.o. male.    Chief Complaint: Follow-up    Emergency room follow-up for frequent falls.  The last episode was described as he is walking with his walker fell backwards onto the floor.  He reports a brief loss of consciousness.  Had no head injury.  He denied headache palpitations shortness of breath.  Emergency room evaluation was done.  His blood pressure was initially elevated.  CT of the head CT of the cervical spine x-ray have his left hip were done with no significant changes.  Lab tests were done including CBC CMP.  Mildly decreased hemoglobin stable at 12.  Electrolytes GFR were negative.  He is using a Rollator type walker.  He was recently admitted to assisted living.  Medical history also includes lumbar degenerative arthritis      Review of Systems   Constitutional: Positive for activity change. Negative for appetite change, chills, fever and unexpected weight change.   HENT: Negative for hearing loss, rhinorrhea and trouble swallowing.    Eyes: Negative for discharge and visual disturbance.   Respiratory: Negative for cough, chest tightness, shortness of breath and wheezing.    Cardiovascular: Negative for chest pain, palpitations and leg swelling.        Syncopal episode   Gastrointestinal: Positive for constipation. Negative for abdominal distention, abdominal pain, blood in stool, diarrhea and vomiting.        He has constipation using Colace and occasionally MiraLax   Endocrine: Negative for polydipsia and polyuria.   Genitourinary: Negative for difficulty urinating, dysuria, hematuria and urgency.   Musculoskeletal: Positive for arthralgias, back pain and joint swelling. Negative for neck pain.        Initial left hip pain has improved.  He reports able to walk and stand with no pain   Neurological: Positive for syncope and weakness. Negative for light-headedness and headaches.   Psychiatric/Behavioral: Positive for dysphoric mood. Negative  for agitation, behavioral problems and confusion.        He reports some difficulty dealing with new living environment       Objective:      Physical Exam   Constitutional: He is oriented to person, place, and time. He appears well-developed and well-nourished. No distress.   Eyes: Conjunctivae are normal.   Cardiovascular: Normal rate and regular rhythm. Exam reveals no gallop.   No murmur heard.  Pulmonary/Chest: Effort normal. No respiratory distress. He has no wheezes. He has no rales.   Abdominal: Soft. He exhibits no distension and no mass. There is no tenderness.   Musculoskeletal:   He has fairly good muscle strength of his lower extremities.  He does like full extension of the left leg at the level of his knee.  He has no hip tenderness. He has no hip rotational pain.   Lymphadenopathy:     He has no cervical adenopathy.   Neurological: He is alert and oriented to person, place, and time. He exhibits normal muscle tone.   Skin: He is not diaphoretic.       Admission on 02/01/2019, Discharged on 02/02/2019   Component Date Value Ref Range Status    Troponin I 02/01/2019 0.006  0.000 - 0.026 ng/mL Final    Sodium 02/01/2019 140  136 - 145 mmol/L Final    Potassium 02/01/2019 3.9  3.5 - 5.1 mmol/L Final    Chloride 02/01/2019 104  95 - 110 mmol/L Final    CO2 02/01/2019 28  23 - 29 mmol/L Final    Glucose 02/01/2019 95  70 - 110 mg/dL Final    BUN, Bld 02/01/2019 24  10 - 30 mg/dL Final    Creatinine 02/01/2019 1.0  0.5 - 1.4 mg/dL Final    Calcium 02/01/2019 9.9  8.7 - 10.5 mg/dL Final    Total Protein 02/01/2019 7.2  6.0 - 8.4 g/dL Final    Albumin 02/01/2019 3.7  3.5 - 5.2 g/dL Final    Total Bilirubin 02/01/2019 0.3  0.1 - 1.0 mg/dL Final    Alkaline Phosphatase 02/01/2019 62  55 - 135 U/L Final    AST 02/01/2019 23  10 - 40 U/L Final    ALT 02/01/2019 16  10 - 44 U/L Final    Anion Gap 02/01/2019 8  8 - 16 mmol/L Final    eGFR if African American 02/01/2019 >60  >60 mL/min/1.73 m^2 Final     eGFR if non African American 02/01/2019 >60  >60 mL/min/1.73 m^2 Final    WBC 02/01/2019 8.37  3.90 - 12.70 K/uL Final    RBC 02/01/2019 3.94* 4.60 - 6.20 M/uL Final    Hemoglobin 02/01/2019 12.2* 14.0 - 18.0 g/dL Final    Hematocrit 02/01/2019 35.4* 40.0 - 54.0 % Final    MCV 02/01/2019 90  82 - 98 fL Final    MCH 02/01/2019 31.0  27.0 - 31.0 pg Final    MCHC 02/01/2019 34.5  32.0 - 36.0 g/dL Final    RDW 02/01/2019 12.9  11.5 - 14.5 % Final    Platelets 02/01/2019 199  150 - 350 K/uL Final    MPV 02/01/2019 9.5  9.2 - 12.9 fL Final    Gran # (ANC) 02/01/2019 4.5  1.8 - 7.7 K/uL Final    Lymph # 02/01/2019 2.5  1.0 - 4.8 K/uL Final    Mono # 02/01/2019 1.1* 0.3 - 1.0 K/uL Final    Eos # 02/01/2019 0.2  0.0 - 0.5 K/uL Final    Baso # 02/01/2019 0.02  0.00 - 0.20 K/uL Final    Gran% 02/01/2019 53.8  38.0 - 73.0 % Final    Lymph% 02/01/2019 30.2  18.0 - 48.0 % Final    Mono% 02/01/2019 13.6  4.0 - 15.0 % Final    Eosinophil% 02/01/2019 2.2  0.0 - 8.0 % Final    Basophil% 02/01/2019 0.2  0.0 - 1.9 % Final    Differential Method 02/01/2019 Automated   Final    Prothrombin Time 02/01/2019 10.3  9.0 - 12.5 sec Final    INR 02/01/2019 0.9  0.8 - 1.2 Final    aPTT 02/01/2019 27.6  21.0 - 32.0 sec Final    Specimen UA 02/01/2019 Urine, Clean Catch   Final    Color, UA 02/01/2019 Yellow  Yellow, Straw, Latoya Final    Appearance, UA 02/01/2019 Clear  Clear Final    pH, UA 02/01/2019 6.0  5.0 - 8.0 Final    Specific Gravity, UA 02/01/2019 1.015  1.005 - 1.030 Final    Protein, UA 02/01/2019 Negative  Negative Final    Glucose, UA 02/01/2019 Negative  Negative Final    Ketones, UA 02/01/2019 Negative  Negative Final    Bilirubin (UA) 02/01/2019 Negative  Negative Final    Occult Blood UA 02/01/2019 Negative  Negative Final    Nitrite, UA 02/01/2019 Negative  Negative Final    Urobilinogen, UA 02/01/2019 Negative  <2.0 EU/dL Final    Leukocytes, UA 02/01/2019 Negative  Negative Final      Assessment:       1. Fall, subsequent encounter    2. Essential hypertension        Plan:     Will consult Physical therapy regards fall and weakness.  Blood pressure today 162/62.  He continues on usual blood pressure medication.  He has not yet started Benicar.  Hospital x-rays and lab were reviewed.  Return as needed.  Otherwise return 1 week after starting Benicar.    Fall, subsequent encounter  -     Ambulatory referral to Home Health    Essential hypertension  -     benazepril-hydrochlorthiazide (LOTENSIN HCT) 20-12.5 mg per tablet; Take 1/2 bid  Dispense: 90 tablet; Refill: 1

## 2019-02-06 ENCOUNTER — PATIENT MESSAGE (OUTPATIENT)
Dept: INTERNAL MEDICINE | Facility: CLINIC | Age: 84
End: 2019-02-06

## 2019-02-06 RX ORDER — TRAMADOL HYDROCHLORIDE 50 MG/1
25 TABLET ORAL EVERY 12 HOURS PRN
Qty: 30 TABLET | Refills: 0 | Status: SHIPPED | OUTPATIENT
Start: 2019-02-06 | End: 2019-06-03

## 2019-02-10 ENCOUNTER — PATIENT MESSAGE (OUTPATIENT)
Dept: INTERNAL MEDICINE | Facility: CLINIC | Age: 84
End: 2019-02-10

## 2019-02-11 DIAGNOSIS — M25.562 CHRONIC PAIN OF BOTH KNEES: Primary | ICD-10-CM

## 2019-02-11 DIAGNOSIS — G89.29 CHRONIC PAIN OF BOTH KNEES: Primary | ICD-10-CM

## 2019-02-11 DIAGNOSIS — M25.561 CHRONIC PAIN OF BOTH KNEES: Primary | ICD-10-CM

## 2019-02-11 NOTE — TELEPHONE ENCOUNTER
Per patient daughter, Skyler Kwon, patient has been complaining of pain behind the knee. Patient daughter wants to know if  can evaluate the patient for a possible tear in his ACL or MCL? Please advise.

## 2019-02-12 ENCOUNTER — TELEPHONE (OUTPATIENT)
Dept: ORTHOPEDICS | Facility: CLINIC | Age: 84
End: 2019-02-12

## 2019-02-18 ENCOUNTER — TELEPHONE (OUTPATIENT)
Dept: CARDIOLOGY | Facility: CLINIC | Age: 84
End: 2019-02-18

## 2019-02-18 NOTE — TELEPHONE ENCOUNTER
Spoke with daughter, she does not want to reschedule for another day. Agreed to see Dr Knight same day. cm

## 2019-02-19 ENCOUNTER — HOSPITAL ENCOUNTER (OUTPATIENT)
Dept: RADIOLOGY | Facility: HOSPITAL | Age: 84
Discharge: HOME | End: 2019-02-19
Attending: FAMILY MEDICINE
Payer: MEDICARE

## 2019-02-19 ENCOUNTER — PATIENT MESSAGE (OUTPATIENT)
Dept: INTERNAL MEDICINE | Facility: CLINIC | Age: 84
End: 2019-02-19

## 2019-02-19 ENCOUNTER — OFFICE VISIT (OUTPATIENT)
Dept: ORTHOPEDICS | Facility: CLINIC | Age: 84
End: 2019-02-19
Payer: MEDICARE

## 2019-02-19 VITALS — HEIGHT: 65 IN | BODY MASS INDEX: 22.99 KG/M2 | WEIGHT: 138 LBS | HEART RATE: 65 BPM

## 2019-02-19 DIAGNOSIS — R93.7 ABNORMAL X-RAY OF LUMBAR SPINE: ICD-10-CM
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
## 2019-02-19 DIAGNOSIS — G89.29 CHRONIC BILATERAL LOW BACK PAIN WITH LEFT-SIDED SCIATICA: ICD-10-CM

## 2019-02-19 DIAGNOSIS — M25.552 LEFT HIP PAIN: ICD-10-CM

## 2019-02-19 DIAGNOSIS — R52 PAIN: Primary | ICD-10-CM

## 2019-02-19 DIAGNOSIS — M54.42 CHRONIC BILATERAL LOW BACK PAIN WITH LEFT-SIDED SCIATICA: Primary | ICD-10-CM

## 2019-02-19 DIAGNOSIS — M54.42 CHRONIC BILATERAL LOW BACK PAIN WITH LEFT-SIDED SCIATICA: ICD-10-CM

## 2019-02-19 DIAGNOSIS — G89.29 CHRONIC BILATERAL LOW BACK PAIN WITH LEFT-SIDED SCIATICA: Primary | ICD-10-CM

## 2019-02-19 DIAGNOSIS — R52 PAIN: ICD-10-CM

## 2019-02-19 PROCEDURE — 73502 X-RAY EXAM HIP UNI 2-3 VIEWS: CPT | Mod: 26,LT,, | Performed by: RADIOLOGY

## 2019-02-19 PROCEDURE — 99999 PR PBB SHADOW E&M-EST. PATIENT-LVL III: ICD-10-PCS | Mod: PBBFAC,,, | Performed by: FAMILY MEDICINE

## 2019-02-19 PROCEDURE — 72110 X-RAY EXAM L-2 SPINE 4/>VWS: CPT | Mod: TC

## 2019-02-19 PROCEDURE — 99999 PR PBB SHADOW E&M-EST. PATIENT-LVL III: CPT | Mod: PBBFAC,,, | Performed by: FAMILY MEDICINE

## 2019-02-19 PROCEDURE — 73502 X-RAY EXAM HIP UNI 2-3 VIEWS: CPT | Mod: TC,LT

## 2019-02-19 PROCEDURE — 72110 XR LUMBAR SPINE COMPLETE 5 VIEW: ICD-10-PCS | Mod: 26,,, | Performed by: RADIOLOGY

## 2019-02-19 PROCEDURE — 73502 XR HIP 2 VIEW LEFT: ICD-10-PCS | Mod: 26,LT,, | Performed by: RADIOLOGY

## 2019-02-19 PROCEDURE — 72110 X-RAY EXAM L-2 SPINE 4/>VWS: CPT | Mod: 26,,, | Performed by: RADIOLOGY

## 2019-02-19 PROCEDURE — 99214 PR OFFICE/OUTPT VISIT, EST, LEVL IV, 30-39 MIN: ICD-10-PCS | Mod: S$PBB,,, | Performed by: FAMILY MEDICINE

## 2019-02-19 PROCEDURE — 99214 OFFICE O/P EST MOD 30 MIN: CPT | Mod: S$PBB,,, | Performed by: FAMILY MEDICINE

## 2019-02-19 PROCEDURE — 99213 OFFICE O/P EST LOW 20 MIN: CPT | Mod: PBBFAC,25 | Performed by: FAMILY MEDICINE

## 2019-02-19 NOTE — PROGRESS NOTES
Subjective:     Patient ID: Coy López is a 89 y.o. male.    Chief Complaint: Pain of the Right Thigh and Pain of the Left Thigh    Patient is a 89-year-old male with a history of DJD of the lumbar spine and presents clinic today complaining of left low back and left thigh pain.  Patient localizes most of his pain to his left anterior groin.  Patient states that the pain is significant enough that is difficult to weight bear.  States he has had multiple falls over the past couple of weeks.  States that he has been having home health physical therapy work with him, but wants to know if there are other options to help manage his pain.  Patient states that he has been to emergency department a couple of times with multiple x-rays and CT scans that were negative for any acute processes.  Patient states that in the past he has had spinal injections for his DJD.  Patient states he has been taking over-the-counter Tylenol which does not help much.  States he has used topical lidocaine in the past, but does not remember if it was particularly helpful.        Past Medical History:   Diagnosis Date    Bladder cancer 2009    Coronary artery disease     Degenerative arthritis of lumbar spine     Hyperlipidemia     Hypertension     LBP (low back pain)     Prostate CA 1994    Renal failure 2006    Right leg pain     TIA (transient ischemic attack) 1980     Past Surgical History:   Procedure Laterality Date    ADENOIDECTOMY      bladder cancer  2009    FOOT SURGERY      PROSTATE SURGERY  1994    TONSILLECTOMY       Family History   Problem Relation Age of Onset    Kidney failure Mother         POST OP GB    Stroke Father     Diabetes Sister     Hypertension Brother     Heart attack Brother     Nephrolithiasis Son      Social History     Socioeconomic History    Marital status:      Spouse name: Not on file    Number of children: 5    Years of education: Not on file    Highest education  level: Not on file   Social Needs    Financial resource strain: Not on file    Food insecurity - worry: Not on file    Food insecurity - inability: Not on file    Transportation needs - medical: Not on file    Transportation needs - non-medical: Not on file   Occupational History    Occupation: Retired   Tobacco Use    Smoking status: Former Smoker     Years: 10.00     Last attempt to quit: 1961     Years since quittin.0    Smokeless tobacco: Never Used   Substance and Sexual Activity    Alcohol use: No     Alcohol/week: 0.0 oz    Drug use: No    Sexual activity: Not Currently   Other Topics Concern    Not on file   Social History Narrative    Not on file     Medication List with Changes/Refills   Current Medications    BENAZEPRIL-HYDROCHLORTHIAZIDE (LOTENSIN HCT) 20-12.5 MG PER TABLET    Take 1/2 bid    CYANOCOBALAMIN (VITAMIN B-12) 1000 MCG TABLET    Take 100 mcg by mouth once daily.    DOCUSATE SODIUM (STOOL SOFTENER ORAL)    Take by mouth.    KETOCONAZOLE (NIZORAL) 2 % CREAM    Apply topically once daily.    MULTIVITAMIN (ONE DAILY MULTIVITAMIN) PER TABLET    Take 1 tablet by mouth once daily.    OLMESARTAN-HYDROCHLOROTHIAZIDE (BENICAR HCT) 20-12.5 MG PER TABLET    Take 1 tablet by mouth once daily.    TRAMADOL (ULTRAM) 50 MG TABLET    Take 0.5 tablets (25 mg total) by mouth every 12 (twelve) hours as needed for Pain.    UBIDECARENONE (CO Q-10 ORAL)    Take by mouth.    VITAMIN D 1000 UNITS TAB    Take 1,000 Units by mouth once daily.     Review of patient's allergies indicates:   Allergen Reactions    Baycol [cerivastatin]     Codeine     Norvasc [amlodipine]     Sular [nisoldipine]      Review of Systems   Constitutional: Negative for chills and fever.   Cardiovascular: Negative for leg swelling.   Gastrointestinal: Negative for nausea and vomiting.   Musculoskeletal: Positive for back pain, falls, joint pain and myalgias.   Skin: Negative for rash.   Neurological: Positive for  "weakness. Negative for tingling, sensory change and focal weakness.        Objective:   Body mass index is 22.96 kg/m².  Vitals:    02/19/19 1448   Pulse: 65   Weight: 62.6 kg (138 lb)   Height: 5' 5" (1.651 m)   PainSc:   6   PainLoc: Leg           General    Nursing note and vitals reviewed.  Constitutional: He is oriented to person, place, and time. He appears well-developed and well-nourished. No distress.   Eyes: Conjunctivae are normal. No scleral icterus.   Pulmonary/Chest: Effort normal.   Neurological: He is alert and oriented to person, place, and time.   Psychiatric: He has a normal mood and affect. His behavior is normal. Judgment and thought content normal.     General Musculoskeletal Exam   Gait: abnormal       Right Knee Exam     Inspection   Erythema: absent  Effusion: absent  Deformity: absent    Tenderness   The patient is experiencing no tenderness.     Range of Motion   Extension: normal   Flexion: normal     Other   Sensation: normal    Left Knee Exam     Inspection   Erythema: absent  Effusion: absent  Deformity: absent    Tenderness   The patient is experiencing no tenderness.     Range of Motion   Extension: normal   Flexion: normal     Other   Sensation: normal    Right Hip Exam     Inspection   No deformity of hip.  Quadriceps Atrophy:  Positive  Left Hip Exam     Inspection   No deformity of hip.  Quadriceps Atrophy:  positive    Range of Motion   Extension: abnormal   Flexion: abnormal   External rotation: normal   Internal rotation: abnormal     Tests   Pain w/ forced internal rotation (NORAH): present  Pain w/ forced external rotation (FADIR): present  Log Roll: positive          EXAMINATION:  XR HIP 2 VIEW LEFT    CLINICAL HISTORY:  Lumbago with sciatica, left side    TECHNIQUE:  AP view of the pelvis and frog leg lateral view of the left hip were performed.    COMPARISON:  02/01/2019    FINDINGS:  There is no evidence to suggest acute fracture or dislocation.  There is some minimal " spurring along the superior acetabulum with small subchondral cyst noted in the superior acetabular region.  Multiple surgical clips noted in the pelvis.  No plain film evidence to suggest AVN.      Impression       As above      Electronically signed by: Alex Chao DO  Date: 02/19/2019  Time: 16:17         EXAMINATION:  XR LUMBAR SPINE COMPLETE 5 VIEW    CLINICAL HISTORY:  Abnormal xray, lumbar spine, DJD;Abnormal findings on diagnostic imaging of other parts of musculoskeletal system    TECHNIQUE:  AP, lateral, spot and bilateral oblique views of the lumbar spine were performed.    COMPARISON:  None    FINDINGS:  There is Mild levoscoliosis of the lumbar spine.  There is severe disc space narrowing noted at the T12-L1 through the L5-S1 levels with relative sparing of the L4-5 level.  There is grade 1 spondylolisthesis of L5 on S1.  Vascular calcification seen involving the aorta.  Multilevel prominent bilateral facet arthropathy noted throughout the entire lumbar spine.  Surgical clips noted within the pelvis      Impression       As above      Electronically signed by: Alex Chao DO  Date: 02/19/2019  Time: 16:18           Coy was seen today for pain and pain.    Diagnoses and all orders for this visit:    Chronic bilateral low back pain with left-sided sciatica  -     X-Ray Hip 2 or 3 views Left; Future  -     X-Ray Lumbar Spine Complete 5 View; Future  -     Ambulatory Referral to Pain Clinic    Fall, subsequent encounter  -     X-Ray Hip 2 or 3 views Left; Future  -     X-Ray Lumbar Spine Complete 5 View; Future  -     Ambulatory Referral to Pain Clinic    Left hip pain  -     X-Ray Hip 2 or 3 views Left; Future  -     X-Ray Lumbar Spine Complete 5 View; Future  -     Ambulatory Referral to Pain Clinic    Abnormal x-ray of lumbar spine  -     X-Ray Hip 2 or 3 views Left; Future  -     X-Ray Lumbar Spine Complete 5 View; Future  -     Ambulatory Referral to Pain Clinic    -patient's history,  symptoms, imaging, and physical exam are consistent with age-related general debility.  I would advised conservative management due to his age and comorbidities.  Will start topical prescription compounding cream for pain relief.  Will also refer to pain management for evaluation for possible epidural and/or SI joint injections.  Continue physical therapy as instructed.  -Formal read by radiologist is as described above  -Discussed findings with patient  -Treatment options and alternatives were discussed with the patient. Patient expressed understanding. Patient was given the opportunity to ask questions and be an active participant in their medical care. Patient had no further questions or concerns at this time.   -Patient is an overall moderate risk for health complications from their medical conditions.

## 2019-02-20 ENCOUNTER — PATIENT MESSAGE (OUTPATIENT)
Dept: INTERNAL MEDICINE | Facility: CLINIC | Age: 84
End: 2019-02-20

## 2019-02-20 NOTE — TELEPHONE ENCOUNTER
A number of blood pressure medicines have been recalled.  Benicar is the medicine we are currently using to replace those medicines.  Continue Benicar unless pharmacist suggest otherwise.

## 2019-02-21 ENCOUNTER — OFFICE VISIT (OUTPATIENT)
Dept: PAIN MEDICINE | Facility: CLINIC | Age: 84
End: 2019-02-21
Payer: MEDICARE

## 2019-02-21 VITALS
HEIGHT: 65 IN | HEART RATE: 60 BPM | DIASTOLIC BLOOD PRESSURE: 65 MMHG | SYSTOLIC BLOOD PRESSURE: 155 MMHG | BODY MASS INDEX: 22.99 KG/M2 | WEIGHT: 138 LBS | RESPIRATION RATE: 16 BRPM

## 2019-02-21 DIAGNOSIS — M54.16 LUMBAR RADICULOPATHY: Primary | ICD-10-CM

## 2019-02-21 DIAGNOSIS — M51.36 DDD (DEGENERATIVE DISC DISEASE), LUMBAR: ICD-10-CM

## 2019-02-21 DIAGNOSIS — M47.817 SPONDYLOSIS OF LUMBOSACRAL REGION WITHOUT MYELOPATHY OR RADICULOPATHY: ICD-10-CM

## 2019-02-21 DIAGNOSIS — M48.061 SPINAL STENOSIS OF LUMBAR REGION, UNSPECIFIED WHETHER NEUROGENIC CLAUDICATION PRESENT: ICD-10-CM

## 2019-02-21 PROCEDURE — 99999 PR PBB SHADOW E&M-EST. PATIENT-LVL IV: ICD-10-PCS | Mod: PBBFAC,,, | Performed by: PHYSICIAN ASSISTANT

## 2019-02-21 PROCEDURE — 99214 OFFICE O/P EST MOD 30 MIN: CPT | Mod: PBBFAC | Performed by: PHYSICIAN ASSISTANT

## 2019-02-21 PROCEDURE — 99214 OFFICE O/P EST MOD 30 MIN: CPT | Mod: S$PBB,,, | Performed by: PHYSICIAN ASSISTANT

## 2019-02-21 PROCEDURE — 99214 PR OFFICE/OUTPT VISIT, EST, LEVL IV, 30-39 MIN: ICD-10-PCS | Mod: S$PBB,,, | Performed by: PHYSICIAN ASSISTANT

## 2019-02-21 PROCEDURE — 99999 PR PBB SHADOW E&M-EST. PATIENT-LVL IV: CPT | Mod: PBBFAC,,, | Performed by: PHYSICIAN ASSISTANT

## 2019-02-21 NOTE — LETTER
February 21, 2019      Pito Renner MD  [id number] [place] [id number]           [facility]  [address] [place] [id number]  Phone: [phone]  Fax: [phone]          Patient: Coy López   MR Number: [id number]   YOB: 1924   Date of Visit: 2/21/2019       Dear Dr. Pito Renner:    Thank you for referring Coy López to me for evaluation. Attached you will find relevant portions of my assessment and plan of care.    If you have questions, please do not hesitate to call me. I look forward to following Coy López along with you.    Sincerely,    Maria Ines Bullock PA-C    Enclosure  CC:  No Recipients    If you would like to receive this communication electronically, please contact [email or web address] or [phone] to request more information on [facility] access.    For providers and/or their staff who would like to refer a patient to [facility], please contact us through our one-stop-shop provider referral line, [facility] [place], at [phone].    If you feel you have received this communication in error or would no longer like to receive these types of communications, please e-mail [email or web address]

## 2019-02-21 NOTE — PROGRESS NOTES
Subjective    Chief Pain Complaint:  Low-back Pain      Interval History: Patient was last seen on 9-12-17 with Dr. Glasgow (over 1.5 years ago).  At that visit, the plan was to move forward with Bilateral L3/4 TF BRITNI, which he ultimately had on 8-30-17 with some relief.  He had not followed up since then.  He was referred back to our clinic by Dr. Renner (ortho), who he saw last week. His daughter has brought him to [facility] where he had acupuncture and decompression, which she states helped his pain.  She states that he only completed 6 out of 24 recommended sessions.  She states he has had a couple falls recently, but there does not seem to be any clear indication why he was falling.  Patient reports it was not true falls.        History of Present Illness:  This patient is a 89 y.o. male who presents today complaining of the above noted pain/s. The patient describes this pain as follows.    - duration of pain: chronic pain, flared over last 2 months   - timing: constant   - character: aching, sharp  - radiating, dermatomal: extends into left leg  - antecedent trauma, prior spinal surgery: patient reports prior trauma, no prior spinal surgery   - other surgeries: none  - pertinent negatives: No fever, No chills, No weight loss, No bladder dysfunction, No bowel dysfunction, No saddle anesthesia  - pertinent positives: left leg weakness    - medications, other therapies tried (physical therapy, injections):     >> NSAIDs, Tylenol, Tramadol    >> Has previously undergone Physical Therapy    >> Has previously undergone spinal injection/s    _________________________________________________________________________________________________________________________________________________________________________________________________________________________      IMAGING / Labs / Studies (reviewed on 2/21/2019):    Results for orders placed during the hospital encounter of 02/01/19   CT Cervical Spine Without  Contrast    Narrative TECHNIQUE:  Axial noncontrast CT scan of the cervical spine with sagittal and coronal reconstructions. All CT scans at this facility use dose modulation, iterative reconstruction, and/or weight based dosing when appropriate to reduce radiation dose to as low as reasonably achievable.  FINDINGS:  There is cervical levocurvature.  There is chronic appearing grade 1 degenerative appearing anterolisthesis at C3-4, C4-5 and C7-T1 with significant facet arthropathy at these levels.  No acute paravertebral soft tissue abnormality identified.  No fracture or acute traumatic appearing malalignment identified.  There is multilevel cervical degenerative disc disease and facet arthropathy with disc height loss and posterior disc osteophyte complexes resulting in multilevel foraminal stenosis and chronic appearing central canal stenosis.  Left carotid endarterectomy changes are noted.    Impression No CT evidence of acute cervical spine injury.  Multilevel cervical spondylosis with chronic, degenerative appearing anterolisthesis at C3-4, C4-5 and C7-T1.       Results for orders placed during the hospital encounter of 02/19/19   X-Ray Lumbar Spine Complete 5 View    Narrative TECHNIQUE:  AP, lateral, spot and bilateral oblique views of the lumbar spine were performed.  COMPARISON:  None  FINDINGS:  There is Mild levoscoliosis of the lumbar spine.  There is severe disc space narrowing noted at the T12-L1 through the L5-S1 levels with relative sparing of the L4-5 level.  There is grade 1 spondylolisthesis of L5 on S1.  Vascular calcification seen involving the aorta.  Multilevel prominent bilateral facet arthropathy noted throughout the entire lumbar spine.  Surgical clips noted within the pelvis     Results for orders placed during the hospital encounter of 08/20/18   X-Ray Sacroiliac Joints Complete    Narrative CLINICAL HISTORY:  Unspecified fall, initial encounter    COMPARISON:  None    FINDINGS:  No  "acute osseous or soft tissue abnormality.  Hip joint spaces maintained without significant osteophyte formation.  There is rather prominent degenerative changes noted L5-S1.  Sacroiliac joints unremarkable.       Results for orders placed during the hospital encounter of 05/18/15   CT Abdomen Pelvis W Wo Contrast    Narrative There is severe degenerative disk disease at multiple levels particularly L1-2, L2-3, L3-4 and L5-S1 with L5-S1 spondylolisthesis.     _________________________________________________________________________________________________________________________________________________________________________________________________________________________      Review of Systems:  CONSTITUTIONAL: patient denies any fever, chills, or weight loss  SKIN: patient denies any rash or itching  RESPIRATORY: patient denies having any shortness of breath  GASTROINTESTINAL: patient reports constipation  GENITOURINARY: patient denies having any abnormal bladder function    MUSCULOSKELETAL:  - patient complains of the above noted pain/s (see chief pain complaint)    NEUROLOGICAL:   - pain as above  - strength in Lower extremities is decreased, on the LEFT  - sensation in Lower extremities is abnormal, on the LEFT  - patient denies any loss of bowel or bladder control      PSYCHIATRIC: patient denies any suicidal or homicidal ideations    _________________________________________________________________________________________________________________________________________________________________________________________________________________________    Objective    Physical Exam:  Vitals:  BP (!) 155/65 (BP Location: Right arm, Patient Position: Sitting, BP Method: Medium (Automatic))   Pulse 60   Resp 16   Ht 5' 5" (1.651 m)   Wt 62.6 kg (138 lb)   BMI 22.96 kg/m²   (reviewed on 2/21/2019)    General: alert and oriented, in no apparent distress.  Gait: in wheelchair.  Skin: no rashes, no discoloration, no " obvious lesions  HEENT: normocephalic, atraumatic. Pupils equal and round.  Cardiovascular: no significant peripheral edema present.  Respiratory: without use of accessory muscles of respiration.    Musculoskeletal - Lumbar Spine:  - Pain on flexion of lumbar spine: Present  - Pain on extension of lumbar spine: Present  - Lumbar facet loading: Present  - TTP over the lumbar facet joints: Present  - TTP over the lumbar paraspinals: Present  - Straight Leg Raise: Present on left  - NORAH: Negative    Neuro - Lower Extremities:  - RLE Strength:     >> 5/5 strength with right hip flexion/ extension    >> 5/5 strength with right knee flexion/ extension    >> 5/5 strength in right ankle with plantar and dorsiflexion  - LLE Strength:     >> 3/5 strength with left hip flexion/ extension    >> 4/5 strength with knee flexion extension on the left     >> 4/5 strength in left ankle with plantar and dorsiflexion  - Sensory: Sensation to light touch intact bilaterally      Psych:  Mood and affect is appropriate    _________________________________________________________________________________________________________________________________________________________________________________________________________________________      Assessment    Assessment:  Coy BALLARD Salvatorealessiokailashbeti is a 89 y.o. male who presents with    ICD-10-CM ICD-9-CM   1. Lumbar radiculopathy M54.16 724.4   2. Spondylosis of lumbosacral region without myelopathy or radiculopathy M47.817 721.3   3. Spinal stenosis of lumbar region, unspecified whether neurogenic claudication present M48.061 724.02   4. DDD (degenerative disc disease), lumbar M51.36 722.52     Patient presents today complaining of left lower extremity pain which extends laterally mostly, but sometimes anteriorly/ posteriorly.  He was previously a patient of [facility] in the past.       Plan:  1. Interventional: None for now.     2. Pharmacologic:   - Tylenol 650mg QID PRN.  - Continue  tramadol 50mg PRN, which he usually takes about 1/2 tab BID.      3. Rehabilitative: Encouraged regular exercise.  He is currently in PT.    4. Diagnostic:  - Order lumbar MRI to evaluate radiculopathy/ potential discogenic sources of pain.   - [place] lumbar [place] and other [place]/ [place] [facility]. Glasgow's initial visit with patient, he reviewed this, but it was not scanned into the chart.     5. Follow up: 3-4 weeks after injection (once ordered).  Will review MRI when results come in and contact daughter (Ester [phone]).    - I discussed the risks, benefits, and alternatives to potential treatment options. All questions and concerns were fully addressed today in clinic. Dr. Bonner was consulted regarding the patient plan and agrees.

## 2019-02-23 ENCOUNTER — PATIENT MESSAGE (OUTPATIENT)
Dept: CARDIOLOGY | Facility: CLINIC | Age: 84
End: 2019-02-23

## 2019-02-25 ENCOUNTER — OFFICE VISIT (OUTPATIENT)
Dept: CARDIOLOGY | Facility: CLINIC | Age: 84
End: 2019-02-25
Payer: MEDICARE

## 2019-02-25 VITALS
SYSTOLIC BLOOD PRESSURE: 186 MMHG | HEART RATE: 60 BPM | WEIGHT: 138.44 LBS | BODY MASS INDEX: 23.07 KG/M2 | HEIGHT: 65 IN | DIASTOLIC BLOOD PRESSURE: 78 MMHG

## 2019-02-25 DIAGNOSIS — I10 ESSENTIAL HYPERTENSION: Primary | ICD-10-CM

## 2019-02-25 DIAGNOSIS — R55 SYNCOPE, UNSPECIFIED SYNCOPE TYPE: ICD-10-CM

## 2019-02-25 DIAGNOSIS — E78.5 HYPERLIPIDEMIA, UNSPECIFIED HYPERLIPIDEMIA TYPE: ICD-10-CM

## 2019-02-25 PROCEDURE — 99999 PR PBB SHADOW E&M-EST. PATIENT-LVL III: ICD-10-PCS | Mod: PBBFAC,,, | Performed by: INTERNAL MEDICINE

## 2019-02-25 PROCEDURE — 99999 PR PBB SHADOW E&M-EST. PATIENT-LVL III: CPT | Mod: PBBFAC,,, | Performed by: INTERNAL MEDICINE

## 2019-02-25 PROCEDURE — 99213 OFFICE O/P EST LOW 20 MIN: CPT | Mod: PBBFAC | Performed by: INTERNAL MEDICINE

## 2019-02-25 PROCEDURE — 99204 PR OFFICE/OUTPT VISIT, NEW, LEVL IV, 45-59 MIN: ICD-10-PCS | Mod: S$PBB,,, | Performed by: INTERNAL MEDICINE

## 2019-02-25 PROCEDURE — 99204 OFFICE O/P NEW MOD 45 MIN: CPT | Mod: S$PBB,,, | Performed by: INTERNAL MEDICINE

## 2019-02-25 RX ORDER — ACETAMINOPHEN 500 MG
500 TABLET ORAL EVERY 6 HOURS PRN
COMMUNITY

## 2019-02-25 NOTE — PROGRESS NOTES
Subjective:   Patient ID:  Coy López is a 89 y.o. male who presents for evaluation of Consult (blood pressure); Loss of Consciousness; Leg Swelling; and Cough      89 yo male, came in for syncope.   PMHx of bladder cancer, CAD, HLD, HTN, renal failure, and TIA in .  Moved to assistant living recently.  On , when transferred from the roller to the diner table, blacked out and fell. Woke up quickly. Woke up and finished diner. Fell twice at other time without black out. In general, dependent on walker and on PT,  Denied chest pain, dyspnea, dizziness and orthopnea.   Went to ER on , brain Ct negative, troponin < 0.006.           Past Medical History:   Diagnosis Date    Bladder cancer     Coronary artery disease     Degenerative arthritis of lumbar spine     Hyperlipidemia     Hypertension     LBP (low back pain)     Prostate CA     Renal failure     Right leg pain     TIA (transient ischemic attack)        Past Surgical History:   Procedure Laterality Date    ADENOIDECTOMY      bladder cancer      FOOT SURGERY      PROSTATE SURGERY      TONSILLECTOMY         Social History     Tobacco Use    Smoking status: Former Smoker     Years: 10.00     Last attempt to quit: 1961     Years since quittin.0    Smokeless tobacco: Never Used   Substance Use Topics    Alcohol use: No     Alcohol/week: 0.0 oz    Drug use: No       Family History   Problem Relation Age of Onset    Kidney failure Mother         POST OP GB    Stroke Father     Diabetes Sister     Hypertension Brother     Heart attack Brother     Nephrolithiasis Son        Review of Systems   Constitution: Negative for decreased appetite, diaphoresis, fever, weakness, malaise/fatigue and night sweats.   HENT: Negative for nosebleeds.    Eyes: Negative for blurred vision and double vision.   Cardiovascular: Positive for syncope. Negative for chest pain, claudication, dyspnea on exertion,  irregular heartbeat, leg swelling, near-syncope, orthopnea, palpitations and paroxysmal nocturnal dyspnea.   Respiratory: Negative for cough, shortness of breath, sleep disturbances due to breathing, snoring, sputum production and wheezing.    Endocrine: Negative for cold intolerance and polyuria.   Hematologic/Lymphatic: Does not bruise/bleed easily.   Skin: Negative for rash.   Musculoskeletal: Negative for back pain, falls, joint pain, joint swelling and neck pain.   Gastrointestinal: Negative for abdominal pain, heartburn, nausea and vomiting.   Genitourinary: Negative for dysuria, frequency and hematuria.   Neurological: Negative for difficulty with concentration, dizziness, focal weakness, headaches, light-headedness, numbness and seizures.   Psychiatric/Behavioral: Negative for depression, memory loss and substance abuse. The patient does not have insomnia.    Allergic/Immunologic: Negative for HIV exposure and hives.       Objective:   Physical Exam   Constitutional: He is oriented to person, place, and time. He appears well-nourished.   HENT:   Head: Normocephalic.   Eyes: Pupils are equal, round, and reactive to light.   Neck: Normal carotid pulses and no JVD present. Carotid bruit is not present. No thyromegaly present.   Cardiovascular: Normal rate, regular rhythm, normal heart sounds and normal pulses.  No extrasystoles are present. PMI is not displaced. Exam reveals no gallop and no S3.   No murmur heard.  Pulmonary/Chest: Breath sounds normal. No stridor. No respiratory distress.   Abdominal: Soft. Bowel sounds are normal. There is no tenderness. There is no rebound.   Musculoskeletal: Normal range of motion.   Neurological: He is alert and oriented to person, place, and time.   Skin: Skin is intact. No rash noted.   Psychiatric: His behavior is normal.       Lab Results   Component Value Date    CHOL 186 10/23/2018    CHOL 178 09/06/2017    CHOL 144 04/26/2016     Lab Results   Component Value Date     HDL 49 10/23/2018    HDL 48 09/06/2017    HDL 41 04/26/2016     Lab Results   Component Value Date    LDLCALC 118.4 10/23/2018    LDLCALC 112.0 09/06/2017    LDLCALC 83.2 04/26/2016     Lab Results   Component Value Date    TRIG 93 10/23/2018    TRIG 90 09/06/2017    TRIG 99 04/26/2016     Lab Results   Component Value Date    CHOLHDL 26.3 10/23/2018    CHOLHDL 27.0 09/06/2017    CHOLHDL 28.5 04/26/2016       Chemistry        Component Value Date/Time     02/01/2019 2131    K 3.9 02/01/2019 2131     02/01/2019 2131    CO2 28 02/01/2019 2131    BUN 24 02/01/2019 2131    CREATININE 1.0 02/01/2019 2131    GLU 95 02/01/2019 2131        Component Value Date/Time    CALCIUM 9.9 02/01/2019 2131    ALKPHOS 62 02/01/2019 2131    AST 23 02/01/2019 2131    ALT 16 02/01/2019 2131    BILITOT 0.3 02/01/2019 2131    ESTGFRAFRICA >60 02/01/2019 2131    EGFRNONAA >60 02/01/2019 2131          No results found for: LABA1C, HGBA1C  Lab Results   Component Value Date    TSH 3.048 04/26/2016     Lab Results   Component Value Date    INR 0.9 02/01/2019    INR 1.1 08/30/2015     Lab Results   Component Value Date    WBC 8.37 02/01/2019    HGB 12.2 (L) 02/01/2019    HCT 35.4 (L) 02/01/2019    MCV 90 02/01/2019     02/01/2019     BNP  @LABRCNTIP(BNP,BNPTRIAGEBLO)@  CrCl cannot be calculated (Patient's most recent lab result is older than the maximum 7 days allowed.).  No results found in the last 24 hours.  No results found in the last 24 hours.  No results found in the last 24 hours.    Assessment:      1. Essential hypertension    2. Hyperlipidemia, unspecified hyperlipidemia type    3. Syncope, unspecified syncope type      Syncope multiple factors.    Plan:   Discuss the low sodium diet  move slowly  Continue PT  Echo   Refer neurology to r/o TIA  Needs aggressive care support and fall precaution  Will call for the result  rtc as needed

## 2019-02-26 ENCOUNTER — TELEPHONE (OUTPATIENT)
Dept: PAIN MEDICINE | Facility: CLINIC | Age: 84
End: 2019-02-26

## 2019-02-26 DIAGNOSIS — L21.9 SEBORRHEA: ICD-10-CM

## 2019-02-26 NOTE — TELEPHONE ENCOUNTER
Patient was contacted to inform about location change for his upcoming appointment in Interventional Pain Management with Maria Ines Bullock PA-C.  Location changed from [facility] to [facility] while ON is under construction.  Patient understands and accepts appointment change.

## 2019-02-27 RX ORDER — KETOCONAZOLE 20 MG/G
CREAM TOPICAL
Qty: 30 G | Refills: 0 | Status: SHIPPED | OUTPATIENT
Start: 2019-02-27

## 2019-03-04 ENCOUNTER — TELEPHONE (OUTPATIENT)
Dept: ADMINISTRATIVE | Facility: CLINIC | Age: 84
End: 2019-03-04

## 2019-03-04 NOTE — TELEPHONE ENCOUNTER
Home Health SOC 02/06/2019 - 04/06/2019 with [facility] ([facility]) - Dr. Roscoe Boyd. Patient received PT services.

## 2019-03-06 ENCOUNTER — OFFICE VISIT (OUTPATIENT)
Dept: INTERNAL MEDICINE | Facility: CLINIC | Age: 84
End: 2019-03-06
Payer: MEDICARE

## 2019-03-06 ENCOUNTER — PATIENT MESSAGE (OUTPATIENT)
Dept: ORTHOPEDICS | Facility: CLINIC | Age: 84
End: 2019-03-06

## 2019-03-06 VITALS
OXYGEN SATURATION: 98 % | HEIGHT: 65 IN | BODY MASS INDEX: 23.64 KG/M2 | TEMPERATURE: 98 F | SYSTOLIC BLOOD PRESSURE: 126 MMHG | HEART RATE: 63 BPM | DIASTOLIC BLOOD PRESSURE: 80 MMHG | WEIGHT: 141.88 LBS

## 2019-03-06 DIAGNOSIS — I10 ESSENTIAL HYPERTENSION: Primary | ICD-10-CM

## 2019-03-06 DIAGNOSIS — M25.561 CHRONIC PAIN OF BOTH KNEES: ICD-10-CM

## 2019-03-06 DIAGNOSIS — R55 SYNCOPE, UNSPECIFIED SYNCOPE TYPE: ICD-10-CM
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
## 2019-03-06 DIAGNOSIS — M47.816 SPONDYLOSIS OF LUMBAR REGION WITHOUT MYELOPATHY OR RADICULOPATHY: ICD-10-CM

## 2019-03-06 DIAGNOSIS — M25.562 CHRONIC PAIN OF BOTH KNEES: ICD-10-CM

## 2019-03-06 DIAGNOSIS — G89.29 CHRONIC PAIN OF BOTH KNEES: ICD-10-CM

## 2019-03-06 PROCEDURE — 99214 OFFICE O/P EST MOD 30 MIN: CPT | Mod: S$PBB,,, | Performed by: FAMILY MEDICINE

## 2019-03-06 PROCEDURE — 99214 PR OFFICE/OUTPT VISIT, EST, LEVL IV, 30-39 MIN: ICD-10-PCS | Mod: S$PBB,,, | Performed by: FAMILY MEDICINE

## 2019-03-06 PROCEDURE — 99999 PR PBB SHADOW E&M-EST. PATIENT-LVL III: CPT | Mod: PBBFAC,,, | Performed by: FAMILY MEDICINE

## 2019-03-06 PROCEDURE — 99999 PR PBB SHADOW E&M-EST. PATIENT-LVL III: ICD-10-PCS | Mod: PBBFAC,,, | Performed by: FAMILY MEDICINE

## 2019-03-06 PROCEDURE — 99213 OFFICE O/P EST LOW 20 MIN: CPT | Mod: PBBFAC,PO | Performed by: FAMILY MEDICINE

## 2019-03-06 NOTE — PROGRESS NOTES
Subjective:       Patient ID: Coy López is a 89 y.o. male.    Chief Complaint: Follow-up    Follow-up chronic low back pain falls weakness depression hypertension chronic constipation.  He has been evaluated by Orthopedics as well as Cardiology.  He currently using Tylenol tramadol and cream prescribed by Orthopedics for low back pain which is helped.  He started ambulate better.  He uses a Rollator to walk.  Blood pressure is controlled with Benicar.  He is using a stool softener and occasional milk of magnesia for constipation.  He denies headache chest pain palpitations shortness of breath or edema      Review of Systems   Constitutional: Positive for fatigue. Negative for appetite change, chills and fever.        He has gained weight since living in assisted living facility.  He is starting to ambulate better with physical therapy   Respiratory: Negative for cough, chest tightness, shortness of breath and wheezing.    Cardiovascular: Negative for chest pain, palpitations and leg swelling.        Denies lightheadedness.  No further syncope.   Gastrointestinal: Positive for constipation. Negative for abdominal distention, abdominal pain, nausea and vomiting.   Musculoskeletal: Positive for arthralgias, back pain and gait problem. Negative for joint swelling and myalgias.   Psychiatric/Behavioral: Positive for dysphoric mood.        Overall improved.  He is adjusting to assisted living slowly       Objective:      Physical Exam   Constitutional: He is oriented to person, place, and time. He appears well-developed and well-nourished. No distress.   Neck: Neck supple. No JVD present. No thyromegaly present.   Cardiovascular: Normal rate, regular rhythm and normal heart sounds.   No murmur heard.  Pulmonary/Chest: Effort normal and breath sounds normal. No respiratory distress. He has no wheezes.   Abdominal: Soft. Bowel sounds are normal. He exhibits no mass. There is no tenderness.   Musculoskeletal:   He  is sitting in wheelchair.  daughter reports he is able ambulate with a Rollator   Lymphadenopathy:     He has no cervical adenopathy.   Neurological: He is alert and oriented to person, place, and time.   Skin: He is not diaphoretic.       Admission on 02/01/2019, Discharged on 02/02/2019   Component Date Value Ref Range Status    Troponin I 02/01/2019 0.006  0.000 - 0.026 ng/mL Final    Sodium 02/01/2019 140  136 - 145 mmol/L Final    Potassium 02/01/2019 3.9  3.5 - 5.1 mmol/L Final    Chloride 02/01/2019 104  95 - 110 mmol/L Final    CO2 02/01/2019 28  23 - 29 mmol/L Final    Glucose 02/01/2019 95  70 - 110 mg/dL Final    BUN, Bld 02/01/2019 24  10 - 30 mg/dL Final    Creatinine 02/01/2019 1.0  0.5 - 1.4 mg/dL Final    Calcium 02/01/2019 9.9  8.7 - 10.5 mg/dL Final    Total Protein 02/01/2019 7.2  6.0 - 8.4 g/dL Final    Albumin 02/01/2019 3.7  3.5 - 5.2 g/dL Final    Total Bilirubin 02/01/2019 0.3  0.1 - 1.0 mg/dL Final    Alkaline Phosphatase 02/01/2019 62  55 - 135 U/L Final    AST 02/01/2019 23  10 - 40 U/L Final    ALT 02/01/2019 16  10 - 44 U/L Final    Anion Gap 02/01/2019 8  8 - 16 mmol/L Final    eGFR if African American 02/01/2019 >60  >60 mL/min/1.73 m^2 Final    eGFR if non African American 02/01/2019 >60  >60 mL/min/1.73 m^2 Final    WBC 02/01/2019 8.37  3.90 - 12.70 K/uL Final    RBC 02/01/2019 3.94* 4.60 - 6.20 M/uL Final    Hemoglobin 02/01/2019 12.2* 14.0 - 18.0 g/dL Final    Hematocrit 02/01/2019 35.4* 40.0 - 54.0 % Final    MCV 02/01/2019 90  82 - 98 fL Final    MCH 02/01/2019 31.0  27.0 - 31.0 pg Final    MCHC 02/01/2019 34.5  32.0 - 36.0 g/dL Final    RDW 02/01/2019 12.9  11.5 - 14.5 % Final    Platelets 02/01/2019 199  150 - 350 K/uL Final    MPV 02/01/2019 9.5  9.2 - 12.9 fL Final    Gran # (ANC) 02/01/2019 4.5  1.8 - 7.7 K/uL Final    Lymph # 02/01/2019 2.5  1.0 - 4.8 K/uL Final    Mono # 02/01/2019 1.1* 0.3 - 1.0 K/uL Final    Eos # 02/01/2019 0.2  0.0 - 0.5  K/uL Final    Baso # 02/01/2019 0.02  0.00 - 0.20 K/uL Final    Gran% 02/01/2019 53.8  38.0 - 73.0 % Final    Lymph% 02/01/2019 30.2  18.0 - 48.0 % Final    Mono% 02/01/2019 13.6  4.0 - 15.0 % Final    Eosinophil% 02/01/2019 2.2  0.0 - 8.0 % Final    Basophil% 02/01/2019 0.2  0.0 - 1.9 % Final    Differential Method 02/01/2019 Automated   Final    Prothrombin Time 02/01/2019 10.3  9.0 - 12.5 sec Final    INR 02/01/2019 0.9  0.8 - 1.2 Final    aPTT 02/01/2019 27.6  21.0 - 32.0 sec Final    Specimen UA 02/01/2019 Urine, Clean Catch   Final    Color, UA 02/01/2019 Yellow  Yellow, Straw, Latoya Final    Appearance, UA 02/01/2019 Clear  Clear Final    pH, UA 02/01/2019 6.0  5.0 - 8.0 Final    Specific Gravity, UA 02/01/2019 1.015  1.005 - 1.030 Final    Protein, UA 02/01/2019 Negative  Negative Final    Glucose, UA 02/01/2019 Negative  Negative Final    Ketones, UA 02/01/2019 Negative  Negative Final    Bilirubin (UA) 02/01/2019 Negative  Negative Final    Occult Blood UA 02/01/2019 Negative  Negative Final    Nitrite, UA 02/01/2019 Negative  Negative Final    Urobilinogen, UA 02/01/2019 Negative  <2.0 EU/dL Final    Leukocytes, UA 02/01/2019 Negative  Negative Final     Assessment:       No diagnosis found.    Plan:     Blood pressure is controlled 126/80.  Medications reviewed.  Continue Tylenol tramadol as discussed.  Follow-up in 3 months.  Continue medications for constipation.  Continue physical therapy.  [facility].    There are no diagnoses linked to this encounter.

## 2019-03-12 ENCOUNTER — TELEPHONE (OUTPATIENT)
Dept: RADIOLOGY | Facility: HOSPITAL | Age: 84
End: 2019-03-12

## 2019-03-18 ENCOUNTER — OFFICE VISIT (OUTPATIENT)
Dept: INTERNAL MEDICINE | Facility: CLINIC | Age: 84
End: 2019-03-18
Payer: MEDICARE

## 2019-03-18 ENCOUNTER — APPOINTMENT (OUTPATIENT)
Dept: RADIOLOGY | Facility: HOSPITAL | Age: 84
End: 2019-03-18
Attending: FAMILY MEDICINE
Payer: MEDICARE

## 2019-03-18 VITALS
BODY MASS INDEX: 22.51 KG/M2 | HEIGHT: 65 IN | OXYGEN SATURATION: 99 % | TEMPERATURE: 98 F | SYSTOLIC BLOOD PRESSURE: 136 MMHG | HEART RATE: 64 BPM | DIASTOLIC BLOOD PRESSURE: 70 MMHG | WEIGHT: 135.13 LBS

## 2019-03-18 DIAGNOSIS — M25.561 CHRONIC PAIN OF BOTH KNEES: ICD-10-CM

## 2019-03-18 DIAGNOSIS — M25.562 CHRONIC PAIN OF BOTH KNEES: ICD-10-CM

## 2019-03-18 DIAGNOSIS — M54.16 LUMBAR RADICULOPATHY: ICD-10-CM

## 2019-03-18 DIAGNOSIS — Z02.2 ENCOUNTER FOR EXAMINATION FOR ADMISSION TO ASSISTED LIVING FACILITY: ICD-10-CM

## 2019-03-18 DIAGNOSIS — I10 ESSENTIAL HYPERTENSION: ICD-10-CM

## 2019-03-18 DIAGNOSIS — I73.9 PAD (PERIPHERAL ARTERY DISEASE): ICD-10-CM

## 2019-03-18 DIAGNOSIS — G89.29 CHRONIC PAIN OF BOTH KNEES: ICD-10-CM

## 2019-03-18 DIAGNOSIS — Z02.2 ENCOUNTER FOR EXAMINATION FOR ADMISSION TO ASSISTED LIVING FACILITY: Primary | ICD-10-CM

## 2019-03-18 PROCEDURE — 99999 PR PBB SHADOW E&M-EST. PATIENT-LVL III: CPT | Mod: PBBFAC,,, | Performed by: FAMILY MEDICINE

## 2019-03-18 PROCEDURE — 71046 X-RAY EXAM CHEST 2 VIEWS: CPT | Mod: TC,FY,PO

## 2019-03-18 PROCEDURE — 99214 OFFICE O/P EST MOD 30 MIN: CPT | Mod: S$PBB,,, | Performed by: FAMILY MEDICINE

## 2019-03-18 PROCEDURE — 71046 XR CHEST PA AND LATERAL: ICD-10-PCS | Mod: 26,,, | Performed by: RADIOLOGY

## 2019-03-18 PROCEDURE — 99999 PR PBB SHADOW E&M-EST. PATIENT-LVL III: ICD-10-PCS | Mod: PBBFAC,,, | Performed by: FAMILY MEDICINE

## 2019-03-18 PROCEDURE — 99213 OFFICE O/P EST LOW 20 MIN: CPT | Mod: PBBFAC,PO,25 | Performed by: FAMILY MEDICINE

## 2019-03-18 PROCEDURE — 71046 X-RAY EXAM CHEST 2 VIEWS: CPT | Mod: 26,,, | Performed by: RADIOLOGY

## 2019-03-18 PROCEDURE — 99214 PR OFFICE/OUTPT VISIT, EST, LEVL IV, 30-39 MIN: ICD-10-PCS | Mod: S$PBB,,, | Performed by: FAMILY MEDICINE

## 2019-03-18 NOTE — PROGRESS NOTES
Subjective:       Patient ID: Coy López is a 89 y.o. male.    Chief Complaint: paperwork (nursing home) and Immunizations (TB)    He is moving to a new assisted living facility and needs his form completed.  He request chest x-ray instead of TB skin test.  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      Review of Systems   Constitutional: Negative for activity change, appetite change, chills, fatigue, fever and unexpected weight change.   Respiratory: Negative for cough, chest tightness, shortness of breath and wheezing.    Cardiovascular: Negative for chest pain, palpitations and leg swelling.        Denies lightheadedness   Gastrointestinal: Positive for constipation. Negative for abdominal distention, abdominal pain, nausea and vomiting.        Constipation controlled with Colace   Genitourinary: Negative for difficulty urinating.   Musculoskeletal: Positive for arthralgias, back pain and gait problem. Negative for joint swelling.   Neurological: Negative for headaches.       Objective:      Physical Exam   Constitutional: He is oriented to person, place, and time. He appears well-developed and well-nourished. No distress.   Neck: Neck supple. No thyromegaly present.   Cardiovascular: Normal rate, regular rhythm and normal heart sounds.   No murmur heard.  Pulmonary/Chest: Effort normal and breath sounds normal. No respiratory distress. He has no wheezes.   Abdominal: Soft. Bowel sounds are normal. He exhibits no mass. There is no tenderness.   Lymphadenopathy:     He has no cervical adenopathy.   Neurological: He is alert and oriented to person, place, and time.   Skin: He is not diaphoretic.       Admission on 02/01/2019, Discharged on 02/02/2019   Component Date Value Ref Range Status    Troponin I 02/01/2019 0.006  0.000 - 0.026  ng/mL Final    Sodium 02/01/2019 140  136 - 145 mmol/L Final    Potassium 02/01/2019 3.9  3.5 - 5.1 mmol/L Final    Chloride 02/01/2019 104  95 - 110 mmol/L Final    CO2 02/01/2019 28  23 - 29 mmol/L Final    Glucose 02/01/2019 95  70 - 110 mg/dL Final    BUN, Bld 02/01/2019 24  10 - 30 mg/dL Final    Creatinine 02/01/2019 1.0  0.5 - 1.4 mg/dL Final    Calcium 02/01/2019 9.9  8.7 - 10.5 mg/dL Final    Total Protein 02/01/2019 7.2  6.0 - 8.4 g/dL Final    Albumin 02/01/2019 3.7  3.5 - 5.2 g/dL Final    Total Bilirubin 02/01/2019 0.3  0.1 - 1.0 mg/dL Final    Alkaline Phosphatase 02/01/2019 62  55 - 135 U/L Final    AST 02/01/2019 23  10 - 40 U/L Final    ALT 02/01/2019 16  10 - 44 U/L Final    Anion Gap 02/01/2019 8  8 - 16 mmol/L Final    eGFR if African American 02/01/2019 >60  >60 mL/min/1.73 m^2 Final    eGFR if non African American 02/01/2019 >60  >60 mL/min/1.73 m^2 Final    WBC 02/01/2019 8.37  3.90 - 12.70 K/uL Final    RBC 02/01/2019 3.94* 4.60 - 6.20 M/uL Final    Hemoglobin 02/01/2019 12.2* 14.0 - 18.0 g/dL Final    Hematocrit 02/01/2019 35.4* 40.0 - 54.0 % Final    MCV 02/01/2019 90  82 - 98 fL Final    MCH 02/01/2019 31.0  27.0 - 31.0 pg Final    MCHC 02/01/2019 34.5  32.0 - 36.0 g/dL Final    RDW 02/01/2019 12.9  11.5 - 14.5 % Final    Platelets 02/01/2019 199  150 - 350 K/uL Final    MPV 02/01/2019 9.5  9.2 - 12.9 fL Final    Gran # (ANC) 02/01/2019 4.5  1.8 - 7.7 K/uL Final    Lymph # 02/01/2019 2.5  1.0 - 4.8 K/uL Final    Mono # 02/01/2019 1.1* 0.3 - 1.0 K/uL Final    Eos # 02/01/2019 0.2  0.0 - 0.5 K/uL Final    Baso # 02/01/2019 0.02  0.00 - 0.20 K/uL Final    Gran% 02/01/2019 53.8  38.0 - 73.0 % Final    Lymph% 02/01/2019 30.2  18.0 - 48.0 % Final    Mono% 02/01/2019 13.6  4.0 - 15.0 % Final    Eosinophil% 02/01/2019 2.2  0.0 - 8.0 % Final    Basophil% 02/01/2019 0.2  0.0 - 1.9 % Final    Differential Method 02/01/2019 Automated   Final    Prothrombin Time  02/01/2019 10.3  9.0 - 12.5 sec Final    INR 02/01/2019 0.9  0.8 - 1.2 Final    aPTT 02/01/2019 27.6  21.0 - 32.0 sec Final    Specimen UA 02/01/2019 Urine, Clean Catch   Final    Color, UA 02/01/2019 Yellow  Yellow, Straw, Latoya Final    Appearance, UA 02/01/2019 Clear  Clear Final    pH, UA 02/01/2019 6.0  5.0 - 8.0 Final    Specific Gravity, UA 02/01/2019 1.015  1.005 - 1.030 Final    Protein, UA 02/01/2019 Negative  Negative Final    Glucose, UA 02/01/2019 Negative  Negative Final    Ketones, UA 02/01/2019 Negative  Negative Final    Bilirubin (UA) 02/01/2019 Negative  Negative Final    Occult Blood UA 02/01/2019 Negative  Negative Final    Nitrite, UA 02/01/2019 Negative  Negative Final    Urobilinogen, UA 02/01/2019 Negative  <2.0 EU/dL Final    Leukocytes, UA 02/01/2019 Negative  Negative Final     Assessment:       1. Encounter for examination for admission to assisted living facility        Plan:     Chest x-ray completed form completed.    Encounter for examination for admission to assisted living facility  -     X-Ray Chest PA And Lateral; Future; Expected date: 03/18/2019

## 2019-03-25 ENCOUNTER — PATIENT MESSAGE (OUTPATIENT)
Dept: INTERNAL MEDICINE | Facility: CLINIC | Age: 84
End: 2019-03-25

## 2019-05-09 ENCOUNTER — PATIENT MESSAGE (OUTPATIENT)
Dept: INTERNAL MEDICINE | Facility: CLINIC | Age: 84
End: 2019-05-09

## 2019-05-09 NOTE — TELEPHONE ENCOUNTER
Spoke with pt and she would like to know if the senokot is in addition to the stool softener already being taken. Please advise.

## 2019-05-14 ENCOUNTER — TELEPHONE (OUTPATIENT)
Dept: INTERNAL MEDICINE | Facility: CLINIC | Age: 84
End: 2019-05-14

## 2019-05-14 NOTE — TELEPHONE ENCOUNTER
Spoke to Skyler and was advised that she needed to know which Senokot to get got her dad.  I advised Skyler that a previous message was sent to  and as soon as  replies, someone will call her to advise.  Skyler verbally understood.

## 2019-05-14 NOTE — TELEPHONE ENCOUNTER
----- Message from Valencia Ball sent at 5/14/2019  1:38 PM CDT -----  Contact: pt's daughter Skyler Hernandez request a call back to verify which medication pt can take for comfort ... call back: [phone]

## 2019-06-03 ENCOUNTER — OFFICE VISIT (OUTPATIENT)
Dept: INTERNAL MEDICINE | Facility: CLINIC | Age: 84
End: 2019-06-03
Payer: MEDICARE

## 2019-06-03 VITALS
WEIGHT: 141.63 LBS | BODY MASS INDEX: 23.6 KG/M2 | OXYGEN SATURATION: 99 % | DIASTOLIC BLOOD PRESSURE: 58 MMHG | SYSTOLIC BLOOD PRESSURE: 96 MMHG | TEMPERATURE: 97 F | HEART RATE: 35 BPM | HEIGHT: 65 IN

## 2019-06-03 DIAGNOSIS — N18.30 CHRONIC KIDNEY DISEASE (CKD), STAGE III (MODERATE): ICD-10-CM

## 2019-06-03 DIAGNOSIS — R53.1 WEAKNESS: Primary | ICD-10-CM

## 2019-06-03 DIAGNOSIS — R60.9 EDEMA, UNSPECIFIED TYPE: ICD-10-CM

## 2019-06-03 DIAGNOSIS — F32.A DEPRESSION, UNSPECIFIED DEPRESSION TYPE: ICD-10-CM

## 2019-06-03 DIAGNOSIS — I10 ESSENTIAL HYPERTENSION: ICD-10-CM

## 2019-06-03 DIAGNOSIS — R00.1 BRADYCARDIA: ICD-10-CM

## 2019-06-03 LAB
ALBUMIN SERPL BCP-MCNC: 3.8 G/DL (ref 3.5–5.2)
ALP SERPL-CCNC: 54 U/L (ref 55–135)
ALT SERPL W/O P-5'-P-CCNC: 15 U/L (ref 10–44)
ANION GAP SERPL CALC-SCNC: 11 MMOL/L (ref 8–16)
AST SERPL-CCNC: 21 U/L (ref 10–40)
BASOPHILS # BLD AUTO: 0.03 K/UL (ref 0–0.2)
BASOPHILS NFR BLD: 0.4 % (ref 0–1.9)
BILIRUB SERPL-MCNC: 0.4 MG/DL (ref 0.1–1)
BUN SERPL-MCNC: 23 MG/DL (ref 10–30)
CALCIUM SERPL-MCNC: 9.7 MG/DL (ref 8.7–10.5)
CHLORIDE SERPL-SCNC: 104 MMOL/L (ref 95–110)
CO2 SERPL-SCNC: 28 MMOL/L (ref 23–29)
CREAT SERPL-MCNC: 1.1 MG/DL (ref 0.5–1.4)
DIFFERENTIAL METHOD: ABNORMAL
EOSINOPHIL # BLD AUTO: 0.1 K/UL (ref 0–0.5)
EOSINOPHIL NFR BLD: 1.3 % (ref 0–8)
ERYTHROCYTE [DISTWIDTH] IN BLOOD BY AUTOMATED COUNT: 12.7 % (ref 11.5–14.5)
EST. GFR  (AFRICAN AMERICAN): >60 ML/MIN/1.73 M^2
EST. GFR  (NON AFRICAN AMERICAN): 57.2 ML/MIN/1.73 M^2
GLUCOSE SERPL-MCNC: 109 MG/DL (ref 70–110)
HCT VFR BLD AUTO: 36.1 % (ref 40–54)
HGB BLD-MCNC: 11.8 G/DL (ref 14–18)
IMM GRANULOCYTES # BLD AUTO: 0.01 K/UL (ref 0–0.04)
IMM GRANULOCYTES NFR BLD AUTO: 0.1 % (ref 0–0.5)
LYMPHOCYTES # BLD AUTO: 1.7 K/UL (ref 1–4.8)
LYMPHOCYTES NFR BLD: 22.3 % (ref 18–48)
MCH RBC QN AUTO: 30.6 PG (ref 27–31)
MCHC RBC AUTO-ENTMCNC: 32.7 G/DL (ref 32–36)
MCV RBC AUTO: 94 FL (ref 82–98)
MONOCYTES # BLD AUTO: 0.6 K/UL (ref 0.3–1)
MONOCYTES NFR BLD: 8.3 % (ref 4–15)
NEUTROPHILS # BLD AUTO: 5.1 K/UL (ref 1.8–7.7)
NEUTROPHILS NFR BLD: 67.6 % (ref 38–73)
NRBC BLD-RTO: 0 /100 WBC
PLATELET # BLD AUTO: 239 K/UL (ref 150–350)
PMV BLD AUTO: 10.8 FL (ref 9.2–12.9)
POTASSIUM SERPL-SCNC: 3.7 MMOL/L (ref 3.5–5.1)
PROT SERPL-MCNC: 7.3 G/DL (ref 6–8.4)
RBC # BLD AUTO: 3.86 M/UL (ref 4.6–6.2)
SODIUM SERPL-SCNC: 143 MMOL/L (ref 136–145)
WBC # BLD AUTO: 7.61 K/UL (ref 3.9–12.7)

## 2019-06-03 PROCEDURE — 99999 PR PBB SHADOW E&M-EST. PATIENT-LVL III: ICD-10-PCS | Mod: PBBFAC,,, | Performed by: FAMILY MEDICINE

## 2019-06-03 PROCEDURE — 99213 OFFICE O/P EST LOW 20 MIN: CPT | Mod: PBBFAC,PO | Performed by: FAMILY MEDICINE

## 2019-06-03 PROCEDURE — 99214 PR OFFICE/OUTPT VISIT, EST, LEVL IV, 30-39 MIN: ICD-10-PCS | Mod: S$PBB,,, | Performed by: FAMILY MEDICINE

## 2019-06-03 PROCEDURE — 80053 COMPREHEN METABOLIC PANEL: CPT

## 2019-06-03 PROCEDURE — 85025 COMPLETE CBC W/AUTO DIFF WBC: CPT

## 2019-06-03 PROCEDURE — 99214 OFFICE O/P EST MOD 30 MIN: CPT | Mod: S$PBB,,, | Performed by: FAMILY MEDICINE

## 2019-06-03 PROCEDURE — 99999 PR PBB SHADOW E&M-EST. PATIENT-LVL III: CPT | Mod: PBBFAC,,, | Performed by: FAMILY MEDICINE

## 2019-06-03 RX ORDER — OLMESARTAN MEDOXOMIL AND HYDROCHLOROTHIAZIDE 20/12.5 20; 12.5 MG/1; MG/1
TABLET ORAL
Qty: 45 TABLET | Refills: 3
Start: 2019-06-03 | End: 2019-07-01

## 2019-06-03 NOTE — PROGRESS NOTES
Subjective:       Patient ID: Coy López is a 89 y.o. male.    Chief Complaint: Follow-up (HTN) and Fatigue (for the last 2 weeks)    Three-month follow-up.  Constipation is improved with Senokot.  He has ongoing weakness of his legs.  He reports he is doing some exercises in his chair and walking with his walker.  His daughter is concerned about depression and asking for a psych nurse follow-up visit.    Review of Systems   Constitutional: Positive for fatigue. Negative for appetite change, diaphoresis and unexpected weight change.   Respiratory: Negative for cough, chest tightness, shortness of breath and wheezing.    Cardiovascular: Negative for chest pain and palpitations.        Ankle swelling   Gastrointestinal: Positive for constipation. Negative for abdominal distention, abdominal pain, nausea and vomiting.   Musculoskeletal: Positive for back pain.        Using Tylenol for back pain.  No longer using tramadol   Neurological: Positive for weakness. Negative for syncope, light-headedness and headaches.   Psychiatric/Behavioral: Positive for dysphoric mood.       Objective:      Physical Exam   Constitutional: He appears well-developed. No distress.   Cardiovascular: Regular rhythm. Exam reveals no gallop and no friction rub.   No murmur heard.  1+ ankle edema  pulse of 36 he appears asymptomatic   Pulmonary/Chest: Effort normal and breath sounds normal. He has no wheezes. He has no rales.   Abdominal: Soft. Bowel sounds are normal. He exhibits no mass. There is no tenderness.   Skin: He is not diaphoretic.       Admission on 02/01/2019, Discharged on 02/02/2019   Component Date Value Ref Range Status    Troponin I 02/01/2019 0.006  0.000 - 0.026 ng/mL Final    Sodium 02/01/2019 140  136 - 145 mmol/L Final    Potassium 02/01/2019 3.9  3.5 - 5.1 mmol/L Final    Chloride 02/01/2019 104  95 - 110 mmol/L Final    CO2 02/01/2019 28  23 - 29 mmol/L Final    Glucose 02/01/2019 95  70 - 110 mg/dL Final     BUN, Bld 02/01/2019 24  10 - 30 mg/dL Final    Creatinine 02/01/2019 1.0  0.5 - 1.4 mg/dL Final    Calcium 02/01/2019 9.9  8.7 - 10.5 mg/dL Final    Total Protein 02/01/2019 7.2  6.0 - 8.4 g/dL Final    Albumin 02/01/2019 3.7  3.5 - 5.2 g/dL Final    Total Bilirubin 02/01/2019 0.3  0.1 - 1.0 mg/dL Final    Alkaline Phosphatase 02/01/2019 62  55 - 135 U/L Final    AST 02/01/2019 23  10 - 40 U/L Final    ALT 02/01/2019 16  10 - 44 U/L Final    Anion Gap 02/01/2019 8  8 - 16 mmol/L Final    eGFR if African American 02/01/2019 >60  >60 mL/min/1.73 m^2 Final    eGFR if non African American 02/01/2019 >60  >60 mL/min/1.73 m^2 Final    WBC 02/01/2019 8.37  3.90 - 12.70 K/uL Final    RBC 02/01/2019 3.94* 4.60 - 6.20 M/uL Final    Hemoglobin 02/01/2019 12.2* 14.0 - 18.0 g/dL Final    Hematocrit 02/01/2019 35.4* 40.0 - 54.0 % Final    Mean Corpuscular Volume 02/01/2019 90  82 - 98 fL Final    Mean Corpuscular Hemoglobin 02/01/2019 31.0  27.0 - 31.0 pg Final    Mean Corpuscular Hemoglobin Conc 02/01/2019 34.5  32.0 - 36.0 g/dL Final    RDW 02/01/2019 12.9  11.5 - 14.5 % Final    Platelets 02/01/2019 199  150 - 350 K/uL Final    MPV 02/01/2019 9.5  9.2 - 12.9 fL Final    Gran # (ANC) 02/01/2019 4.5  1.8 - 7.7 K/uL Final    Lymph # 02/01/2019 2.5  1.0 - 4.8 K/uL Final    Mono # 02/01/2019 1.1* 0.3 - 1.0 K/uL Final    Eos # 02/01/2019 0.2  0.0 - 0.5 K/uL Final    Baso # 02/01/2019 0.02  0.00 - 0.20 K/uL Final    Gran% 02/01/2019 53.8  38.0 - 73.0 % Final    Lymph% 02/01/2019 30.2  18.0 - 48.0 % Final    Mono% 02/01/2019 13.6  4.0 - 15.0 % Final    Eosinophil% 02/01/2019 2.2  0.0 - 8.0 % Final    Basophil% 02/01/2019 0.2  0.0 - 1.9 % Final    Differential Method 02/01/2019 Automated   Final    Prothrombin Time 02/01/2019 10.3  9.0 - 12.5 sec Final    INR 02/01/2019 0.9  0.8 - 1.2 Final    aPTT 02/01/2019 27.6  21.0 - 32.0 sec Final    Specimen UA 02/01/2019 Urine, Clean Catch   Final     Color, UA 02/01/2019 Yellow  Yellow, Straw, Latoya Final    Appearance, UA 02/01/2019 Clear  Clear Final    pH, UA 02/01/2019 6.0  5.0 - 8.0 Final    Specific Gravity, UA 02/01/2019 1.015  1.005 - 1.030 Final    Protein, UA 02/01/2019 Negative  Negative Final    Glucose, UA 02/01/2019 Negative  Negative Final    Ketones, UA 02/01/2019 Negative  Negative Final    Bilirubin (UA) 02/01/2019 Negative  Negative Final    Occult Blood UA 02/01/2019 Negative  Negative Final    Nitrite, UA 02/01/2019 Negative  Negative Final    Urobilinogen, UA 02/01/2019 Negative  <2.0 EU/dL Final    Leukocytes, UA 02/01/2019 Negative  Negative Final     Assessment:       1. Weakness        Plan:   He gained about 6 lb of weight since last visit.  He reports good appetite.  Blood pressure is 96/58.  Will decrease Benicar HCTZ to 1/2 a day.  Continue Senokot for constipation.  Will order home health with psych nurse for depression evaluation per family request.  Lab was ordered.  Follow-up in 1 week regards pulse 36 he is in no distress      Weakness  -     CBC auto differential  -     Comprehensive metabolic panel    Other orders  -     olmesartan-hydrochlorothiazide (BENICAR HCT) 20-12.5 mg per tablet; Take 1/2 daily  Dispense: 45 tablet; Refill: 3

## 2019-06-05 ENCOUNTER — PATIENT MESSAGE (OUTPATIENT)
Dept: INTERNAL MEDICINE | Facility: CLINIC | Age: 84
End: 2019-06-05

## 2019-06-10 ENCOUNTER — OFFICE VISIT (OUTPATIENT)
Dept: INTERNAL MEDICINE | Facility: CLINIC | Age: 84
End: 2019-06-10
Payer: MEDICARE

## 2019-06-10 VITALS
OXYGEN SATURATION: 98 % | BODY MASS INDEX: 24.1 KG/M2 | WEIGHT: 144.63 LBS | DIASTOLIC BLOOD PRESSURE: 68 MMHG | HEART RATE: 35 BPM | HEIGHT: 65 IN | SYSTOLIC BLOOD PRESSURE: 110 MMHG | TEMPERATURE: 98 F

## 2019-06-10 DIAGNOSIS — R00.1 BRADYCARDIA: ICD-10-CM

## 2019-06-10 DIAGNOSIS — R60.9 EDEMA, UNSPECIFIED TYPE: Primary | ICD-10-CM

## 2019-06-10 DIAGNOSIS — I95.89 OTHER SPECIFIED HYPOTENSION: ICD-10-CM

## 2019-06-10 PROBLEM — I95.9 ARTERIAL HYPOTENSION: Status: ACTIVE | Noted: 2019-06-10

## 2019-06-10 PROCEDURE — 99213 OFFICE O/P EST LOW 20 MIN: CPT | Mod: PBBFAC,PO | Performed by: FAMILY MEDICINE

## 2019-06-10 PROCEDURE — 99999 PR PBB SHADOW E&M-EST. PATIENT-LVL III: ICD-10-PCS | Mod: PBBFAC,,, | Performed by: FAMILY MEDICINE

## 2019-06-10 PROCEDURE — 99999 PR PBB SHADOW E&M-EST. PATIENT-LVL III: CPT | Mod: PBBFAC,,, | Performed by: FAMILY MEDICINE

## 2019-06-10 PROCEDURE — 99213 PR OFFICE/OUTPT VISIT, EST, LEVL III, 20-29 MIN: ICD-10-PCS | Mod: S$PBB,,, | Performed by: FAMILY MEDICINE

## 2019-06-10 PROCEDURE — 99213 OFFICE O/P EST LOW 20 MIN: CPT | Mod: S$PBB,,, | Performed by: FAMILY MEDICINE

## 2019-06-10 NOTE — PROGRESS NOTES
Subjective:       Patient ID: Coy López is a 89 y.o. male.    Chief Complaint: Follow-up (low blood pressure/weakness)    followup hypotension and bradycardia  He feels his usual self with no palp liteheadedness chest pain    Review of Systems   Constitutional: Positive for fatigue. Negative for diaphoresis.   Respiratory: Negative for cough, chest tightness, shortness of breath and wheezing.    Cardiovascular: Positive for leg swelling. Negative for chest pain and palpitations.        Stable edema lower legs   Neurological: Positive for weakness. Negative for dizziness, facial asymmetry, light-headedness, numbness and headaches.        Chronic weakness stable       Objective:      Physical Exam   Constitutional: He appears well-nourished. No distress.   Cardiovascular: Regular rhythm.   No murmur heard.  bradycardia   Pulmonary/Chest: Effort normal and breath sounds normal.   Abdominal: Soft. Bowel sounds are normal.   Neurological: He is alert.   Skin: He is not diaphoretic.       Office Visit on 06/03/2019   Component Date Value Ref Range Status    WBC 06/03/2019 7.61  3.90 - 12.70 K/uL Final    RBC 06/03/2019 3.86* 4.60 - 6.20 M/uL Final    Hemoglobin 06/03/2019 11.8* 14.0 - 18.0 g/dL Final    Hematocrit 06/03/2019 36.1* 40.0 - 54.0 % Final    Mean Corpuscular Volume 06/03/2019 94  82 - 98 fL Final    Mean Corpuscular Hemoglobin 06/03/2019 30.6  27.0 - 31.0 pg Final    Mean Corpuscular Hemoglobin Conc 06/03/2019 32.7  32.0 - 36.0 g/dL Final    RDW 06/03/2019 12.7  11.5 - 14.5 % Final    Platelets 06/03/2019 239  150 - 350 K/uL Final    MPV 06/03/2019 10.8  9.2 - 12.9 fL Final    Immature Granulocytes 06/03/2019 0.1  0.0 - 0.5 % Final    Gran # (ANC) 06/03/2019 5.1  1.8 - 7.7 K/uL Final    Immature Grans (Abs) 06/03/2019 0.01  0.00 - 0.04 K/uL Final    Lymph # 06/03/2019 1.7  1.0 - 4.8 K/uL Final    Mono # 06/03/2019 0.6  0.3 - 1.0 K/uL Final    Eos # 06/03/2019 0.1  0.0 - 0.5 K/uL  Final    Baso # 06/03/2019 0.03  0.00 - 0.20 K/uL Final    nRBC 06/03/2019 0  0 /100 WBC Final    Gran% 06/03/2019 67.6  38.0 - 73.0 % Final    Lymph% 06/03/2019 22.3  18.0 - 48.0 % Final    Mono% 06/03/2019 8.3  4.0 - 15.0 % Final    Eosinophil% 06/03/2019 1.3  0.0 - 8.0 % Final    Basophil% 06/03/2019 0.4  0.0 - 1.9 % Final    Differential Method 06/03/2019 Automated   Final    Sodium 06/03/2019 143  136 - 145 mmol/L Final    Potassium 06/03/2019 3.7  3.5 - 5.1 mmol/L Final    Chloride 06/03/2019 104  95 - 110 mmol/L Final    CO2 06/03/2019 28  23 - 29 mmol/L Final    Glucose 06/03/2019 109  70 - 110 mg/dL Final    BUN, Bld 06/03/2019 23  10 - 30 mg/dL Final    Creatinine 06/03/2019 1.1  0.5 - 1.4 mg/dL Final    Calcium 06/03/2019 9.7  8.7 - 10.5 mg/dL Final    Total Protein 06/03/2019 7.3  6.0 - 8.4 g/dL Final    Albumin 06/03/2019 3.8  3.5 - 5.2 g/dL Final    Total Bilirubin 06/03/2019 0.4  0.1 - 1.0 mg/dL Final    Alkaline Phosphatase 06/03/2019 54* 55 - 135 U/L Final    AST 06/03/2019 21  10 - 40 U/L Final    ALT 06/03/2019 15  10 - 44 U/L Final    Anion Gap 06/03/2019 11  8 - 16 mmol/L Final    eGFR if African American 06/03/2019 >60.0  >60 mL/min/1.73 m^2 Final    eGFR if non African American 06/03/2019 57.2* >60 mL/min/1.73 m^2 Final     Assessment:     hold benicar for 1wk and ov 1wk at assisted living nurse to ck bp qd and will resume benicar if elevated  No diagnosis found.    Plan:         There are no diagnoses linked to this encounter.

## 2019-06-14 ENCOUNTER — TELEPHONE (OUTPATIENT)
Dept: INTERNAL MEDICINE | Facility: CLINIC | Age: 84
End: 2019-06-14

## 2019-06-14 ENCOUNTER — PATIENT MESSAGE (OUTPATIENT)
Dept: INTERNAL MEDICINE | Facility: CLINIC | Age: 84
End: 2019-06-14

## 2019-06-14 NOTE — TELEPHONE ENCOUNTER
Pt's daughter states current assisted living facility is stating pt has been depressed. Daughter, Ester, states [facility] has a psych nurse who pt has previously seen and would like a referral for home health services. Daughter informed Dr Boyd would be out of the office until Monday and verbalized understanding.

## 2019-06-14 NOTE — TELEPHONE ENCOUNTER
----- Message from David Brown sent at 6/14/2019  1:13 PM CDT -----  Contact: Pt/Daughter (Ester)  Please give pt daughter a call at [phone] regarding a psych nurse for the pt who is suffering from depression.

## 2019-06-17 ENCOUNTER — OFFICE VISIT (OUTPATIENT)
Dept: INTERNAL MEDICINE | Facility: CLINIC | Age: 84
End: 2019-06-17
Payer: MEDICARE

## 2019-06-17 VITALS
HEIGHT: 65 IN | SYSTOLIC BLOOD PRESSURE: 156 MMHG | TEMPERATURE: 98 F | WEIGHT: 142 LBS | OXYGEN SATURATION: 98 % | DIASTOLIC BLOOD PRESSURE: 78 MMHG | BODY MASS INDEX: 23.66 KG/M2 | HEART RATE: 34 BPM

## 2019-06-17 DIAGNOSIS — R00.1 BRADYCARDIA: Primary | ICD-10-CM

## 2019-06-17 DIAGNOSIS — R60.9 EDEMA, UNSPECIFIED TYPE: ICD-10-CM

## 2019-06-17 DIAGNOSIS — F32.9 REACTIVE DEPRESSION: Primary | ICD-10-CM

## 2019-06-17 DIAGNOSIS — I10 ESSENTIAL HYPERTENSION: ICD-10-CM

## 2019-06-17 PROCEDURE — 99999 PR PBB SHADOW E&M-EST. PATIENT-LVL III: CPT | Mod: PBBFAC,,, | Performed by: FAMILY MEDICINE

## 2019-06-17 PROCEDURE — 99213 PR OFFICE/OUTPT VISIT, EST, LEVL III, 20-29 MIN: ICD-10-PCS | Mod: S$PBB,,, | Performed by: FAMILY MEDICINE

## 2019-06-17 PROCEDURE — 99213 OFFICE O/P EST LOW 20 MIN: CPT | Mod: PBBFAC,PO | Performed by: FAMILY MEDICINE

## 2019-06-17 PROCEDURE — 99213 OFFICE O/P EST LOW 20 MIN: CPT | Mod: S$PBB,,, | Performed by: FAMILY MEDICINE

## 2019-06-17 PROCEDURE — 99999 PR PBB SHADOW E&M-EST. PATIENT-LVL III: ICD-10-PCS | Mod: PBBFAC,,, | Performed by: FAMILY MEDICINE

## 2019-06-17 NOTE — TELEPHONE ENCOUNTER
Contacted pt's daughter to inform of referral. Daughter states she will be by the office today to  referral. Referral has been printed and is ready for .

## 2019-06-17 NOTE — PROGRESS NOTES
Subjective:       Patient ID: Coy López is a 89 y.o. male.    Chief Complaint: Follow-up (low pulse rate); Abdominal Pain; motion sickness; and Insomnia    Benicar HCTZ 1/2 a day is being resumed.  Since last visit systolic blood pressures been and 150-180 range.  He denies headache chest pain palpitations shortness breath.  He has less ankle edema with leg elevation.  He continues with bradycardia with a pulse 35.    Review of Systems   Constitutional: Positive for fatigue. Negative for activity change, appetite change and diaphoresis.   Respiratory: Negative for cough, shortness of breath and wheezing.    Cardiovascular: Positive for leg swelling. Negative for chest pain and palpitations.   Neurological: Positive for dizziness and weakness. Negative for syncope, facial asymmetry and speech difficulty.        He reports ongoing sense of unbalance at times.  He is using a walker and ambulate he denies nasal congestion sneezing postnasal drip.       Objective:      Physical Exam   Constitutional: He appears well-developed and well-nourished. No distress.   Cardiovascular: Regular rhythm.   Pulse of 35   Pulmonary/Chest: Effort normal and breath sounds normal.   Abdominal: Soft. There is no tenderness. There is no guarding.       Office Visit on 06/03/2019   Component Date Value Ref Range Status    WBC 06/03/2019 7.61  3.90 - 12.70 K/uL Final    RBC 06/03/2019 3.86* 4.60 - 6.20 M/uL Final    Hemoglobin 06/03/2019 11.8* 14.0 - 18.0 g/dL Final    Hematocrit 06/03/2019 36.1* 40.0 - 54.0 % Final    Mean Corpuscular Volume 06/03/2019 94  82 - 98 fL Final    Mean Corpuscular Hemoglobin 06/03/2019 30.6  27.0 - 31.0 pg Final    Mean Corpuscular Hemoglobin Conc 06/03/2019 32.7  32.0 - 36.0 g/dL Final    RDW 06/03/2019 12.7  11.5 - 14.5 % Final    Platelets 06/03/2019 239  150 - 350 K/uL Final    MPV 06/03/2019 10.8  9.2 - 12.9 fL Final    Immature Granulocytes 06/03/2019 0.1  0.0 - 0.5 % Final    Gran #  (ANC) 06/03/2019 5.1  1.8 - 7.7 K/uL Final    Immature Grans (Abs) 06/03/2019 0.01  0.00 - 0.04 K/uL Final    Lymph # 06/03/2019 1.7  1.0 - 4.8 K/uL Final    Mono # 06/03/2019 0.6  0.3 - 1.0 K/uL Final    Eos # 06/03/2019 0.1  0.0 - 0.5 K/uL Final    Baso # 06/03/2019 0.03  0.00 - 0.20 K/uL Final    nRBC 06/03/2019 0  0 /100 WBC Final    Gran% 06/03/2019 67.6  38.0 - 73.0 % Final    Lymph% 06/03/2019 22.3  18.0 - 48.0 % Final    Mono% 06/03/2019 8.3  4.0 - 15.0 % Final    Eosinophil% 06/03/2019 1.3  0.0 - 8.0 % Final    Basophil% 06/03/2019 0.4  0.0 - 1.9 % Final    Differential Method 06/03/2019 Automated   Final    Sodium 06/03/2019 143  136 - 145 mmol/L Final    Potassium 06/03/2019 3.7  3.5 - 5.1 mmol/L Final    Chloride 06/03/2019 104  95 - 110 mmol/L Final    CO2 06/03/2019 28  23 - 29 mmol/L Final    Glucose 06/03/2019 109  70 - 110 mg/dL Final    BUN, Bld 06/03/2019 23  10 - 30 mg/dL Final    Creatinine 06/03/2019 1.1  0.5 - 1.4 mg/dL Final    Calcium 06/03/2019 9.7  8.7 - 10.5 mg/dL Final    Total Protein 06/03/2019 7.3  6.0 - 8.4 g/dL Final    Albumin 06/03/2019 3.8  3.5 - 5.2 g/dL Final    Total Bilirubin 06/03/2019 0.4  0.1 - 1.0 mg/dL Final    Alkaline Phosphatase 06/03/2019 54* 55 - 135 U/L Final    AST 06/03/2019 21  10 - 40 U/L Final    ALT 06/03/2019 15  10 - 44 U/L Final    Anion Gap 06/03/2019 11  8 - 16 mmol/L Final    eGFR if African American 06/03/2019 >60.0  >60 mL/min/1.73 m^2 Final    eGFR if non African American 06/03/2019 57.2* >60 mL/min/1.73 m^2 Final     Assessment:       1. Bradycardia        Plan:     External home health consult for depression evaluation per family request.  Cardiology referral for recommendation bradycardia.  Follow-up 1 month    Bradycardia  -     Ambulatory referral to Cardiology

## 2019-06-18 PROCEDURE — G0180 MD CERTIFICATION HHA PATIENT: HCPCS | Mod: ,,, | Performed by: FAMILY MEDICINE

## 2019-06-18 PROCEDURE — G0180 PR HOME HEALTH MD CERTIFICATION: ICD-10-PCS | Mod: ,,, | Performed by: FAMILY MEDICINE

## 2019-06-19 ENCOUNTER — TELEPHONE (OUTPATIENT)
Dept: INTERNAL MEDICINE | Facility: CLINIC | Age: 84
End: 2019-06-19

## 2019-06-19 ENCOUNTER — PATIENT MESSAGE (OUTPATIENT)
Dept: INTERNAL MEDICINE | Facility: CLINIC | Age: 84
End: 2019-06-19

## 2019-06-19 DIAGNOSIS — R00.1 BRADYCARDIA: Primary | ICD-10-CM

## 2019-06-19 DIAGNOSIS — Z53.20 REFUSES TREATMENT: ICD-10-CM

## 2019-06-19 NOTE — TELEPHONE ENCOUNTER
Spoke to Tamica and was advised that she visited the patient on yesterday and completed the psy eval.  Tamica then stated that she would like to get an order for PT and OT.  Tamica also stated that the patient does not want a pacemaker and needs to know would that put him at risk for receiving PT and OT and if so then she would need an order for more pallative care, or a hospice consult.  Please advise.

## 2019-06-19 NOTE — TELEPHONE ENCOUNTER
----- Message from Joshua Gilbert sent at 6/19/2019  1:15 PM CDT -----  Contact: [facility]-[phone]  Returning call to nurse. Please call back at [phone].  Md Simon

## 2019-06-19 NOTE — TELEPHONE ENCOUNTER
Called Tamica to advise that per : His pulse rate is about 35.  Probably better to not do OT PT.  Okay for additional palliative care. Received no answer.  Left a message.

## 2019-06-19 NOTE — TELEPHONE ENCOUNTER
Spoke with Tamica, pt will agree to hospice care with [facility] and see if he qualifies, they want to have an order for hospice requesting Sabine to be the one to see him. The order needs to say that he is refusing medical advice of pacemaker and bradycardia. Please advise

## 2019-06-25 ENCOUNTER — PATIENT MESSAGE (OUTPATIENT)
Dept: INTERNAL MEDICINE | Facility: CLINIC | Age: 84
End: 2019-06-25

## 2019-06-26 ENCOUNTER — PATIENT MESSAGE (OUTPATIENT)
Dept: INTERNAL MEDICINE | Facility: CLINIC | Age: 84
End: 2019-06-26

## 2019-06-26 NOTE — TELEPHONE ENCOUNTER
Spoke to Tamica and was advised that she needs  to fax an order for Hospice care to  Sabine at [facility] for hospice evaluation.  Please advise.

## 2019-06-28 ENCOUNTER — PATIENT MESSAGE (OUTPATIENT)
Dept: INTERNAL MEDICINE | Facility: CLINIC | Age: 84
End: 2019-06-28

## 2019-07-01 DIAGNOSIS — R00.1 BRADYCARDIA: Primary | ICD-10-CM

## 2019-07-01 RX ORDER — OLMESARTAN MEDOXOMIL AND HYDROCHLOROTHIAZIDE 20/12.5 20; 12.5 MG/1; MG/1
TABLET ORAL
Qty: 45 TABLET | Refills: 3
Start: 2019-07-01

## 2019-07-01 NOTE — TELEPHONE ENCOUNTER
Called Ester to advise.  Ester verbally understood and asked about the melatonin.  Ester then stated that her sister had already sent a message inquiring about the melatonin.  Sending a message to  on that encounter.

## 2019-07-01 NOTE — TELEPHONE ENCOUNTER
Ester verbally understood about the benadryl.  But wanted to know would it be safe for the patient to take melatonin instead and if so, how much.  Please advise.

## 2019-07-01 NOTE — TELEPHONE ENCOUNTER
Called Ester to advise that Melatonin would be fine  dosage of 3 or 6 mg at bedtime as needed.  Ester verbally understood.

## 2019-07-02 ENCOUNTER — OFFICE VISIT (OUTPATIENT)
Dept: CARDIOLOGY | Facility: CLINIC | Age: 84
End: 2019-07-02
Payer: MEDICARE

## 2019-07-02 VITALS — HEART RATE: 64 BPM | SYSTOLIC BLOOD PRESSURE: 144 MMHG | DIASTOLIC BLOOD PRESSURE: 58 MMHG

## 2019-07-02 DIAGNOSIS — N18.30 CHRONIC KIDNEY DISEASE (CKD), STAGE III (MODERATE): ICD-10-CM

## 2019-07-02 DIAGNOSIS — R62.7 FAILURE TO THRIVE SYNDROME, ADULT: ICD-10-CM

## 2019-07-02 DIAGNOSIS — I10 HTN (HYPERTENSION): Primary | ICD-10-CM

## 2019-07-02 DIAGNOSIS — I10 HTN (HYPERTENSION): ICD-10-CM

## 2019-07-02 DIAGNOSIS — I10 ESSENTIAL HYPERTENSION: ICD-10-CM

## 2019-07-02 DIAGNOSIS — C67.9 MALIGNANT NEOPLASM OF URINARY BLADDER, UNSPECIFIED SITE: ICD-10-CM

## 2019-07-02 DIAGNOSIS — R00.1 BRADYCARDIA: Primary | ICD-10-CM

## 2019-07-02 DIAGNOSIS — I73.9 PAD (PERIPHERAL ARTERY DISEASE): ICD-10-CM

## 2019-07-02 PROCEDURE — 93010 ELECTROCARDIOGRAM REPORT: CPT | Mod: S$PBB,,, | Performed by: INTERNAL MEDICINE

## 2019-07-02 PROCEDURE — 99999 PR PBB SHADOW E&M-EST. PATIENT-LVL II: CPT | Mod: PBBFAC,,, | Performed by: INTERNAL MEDICINE

## 2019-07-02 PROCEDURE — 93005 ELECTROCARDIOGRAM TRACING: CPT | Mod: PBBFAC | Performed by: INTERNAL MEDICINE

## 2019-07-02 PROCEDURE — 99999 PR PBB SHADOW E&M-EST. PATIENT-LVL II: ICD-10-PCS | Mod: PBBFAC,,, | Performed by: INTERNAL MEDICINE

## 2019-07-02 PROCEDURE — 93010 EKG 12-LEAD: ICD-10-PCS | Mod: S$PBB,,, | Performed by: INTERNAL MEDICINE

## 2019-07-02 PROCEDURE — 99212 OFFICE O/P EST SF 10 MIN: CPT | Mod: PBBFAC | Performed by: INTERNAL MEDICINE

## 2019-07-02 PROCEDURE — 99214 PR OFFICE/OUTPT VISIT, EST, LEVL IV, 30-39 MIN: ICD-10-PCS | Mod: S$PBB,,, | Performed by: INTERNAL MEDICINE

## 2019-07-02 PROCEDURE — 99214 OFFICE O/P EST MOD 30 MIN: CPT | Mod: S$PBB,,, | Performed by: INTERNAL MEDICINE

## 2019-07-02 RX ORDER — TALC
POWDER (GRAM) TOPICAL
COMMUNITY

## 2019-07-02 NOTE — PROGRESS NOTES
Subjective:   Patient ID:  Coy López is a 89 y.o. male who presents for follow up of Irregular Heart Beat (low HR) and Fatigue      89 yo male, came in for syncope.   PMHx of bladder cancer, CAD, HLD, HTN, renal failure, and TIA in . Confusion no clear Dx of dementia  Moved to assistant living in   On , when transferred from the roller to the diner table, blacked out and fell. Woke up quickly. Woke up and finished diner. Fell twice at other time without black out. In general, dependent on walker and on PT,  Denied chest pain, dyspnea, dizziness and orthopnea.   Went to ER on , brain Ct negative, troponin < 0.006.   Echo in  normal EF  2019 had PCP visit. C/o weakness. HR 35 bpm per documentation and BP  Low. Adjusted HTN med.  In assistant living, does not want to walk. Most of time, sitting in the chair. And transport to the meal. No syncope, dyspnea, dizziness and faitn recently per huis daughter  Today, HR at 67 bpm.          Past Medical History:   Diagnosis Date    Bladder cancer     Coronary artery disease     Degenerative arthritis of lumbar spine     Hyperlipidemia     Hypertension     LBP (low back pain)     Prostate CA     Renal failure     Right leg pain     TIA (transient ischemic attack)        Past Surgical History:   Procedure Laterality Date    ADENOIDECTOMY      bladder cancer  2009    FOOT SURGERY      PROSTATE SURGERY      TONSILLECTOMY         Social History     Tobacco Use    Smoking status: Former Smoker     Years: 10.00     Last attempt to quit: 1961     Years since quittin.4    Smokeless tobacco: Never Used   Substance Use Topics    Alcohol use: No     Alcohol/week: 0.0 oz    Drug use: No       Family History   Problem Relation Age of Onset    Kidney failure Mother         POST OP GB    Stroke Father     Diabetes Sister     Hypertension Brother     Heart attack Brother     Nephrolithiasis Son           Review of Systems   Constitution: Negative for decreased appetite, diaphoresis, fever, malaise/fatigue and night sweats.   HENT: Negative for nosebleeds.    Eyes: Negative for blurred vision and double vision.   Cardiovascular: Positive for syncope. Negative for chest pain, claudication, dyspnea on exertion, irregular heartbeat, leg swelling, near-syncope, orthopnea, palpitations and paroxysmal nocturnal dyspnea.   Respiratory: Negative for cough, shortness of breath, sleep disturbances due to breathing, snoring, sputum production and wheezing.    Endocrine: Negative for cold intolerance and polyuria.   Hematologic/Lymphatic: Does not bruise/bleed easily.   Skin: Negative for rash.   Musculoskeletal: Negative for back pain, falls, joint pain, joint swelling and neck pain.   Gastrointestinal: Negative for abdominal pain, heartburn, nausea and vomiting.   Genitourinary: Negative for dysuria, frequency and hematuria.   Neurological: Negative for difficulty with concentration, dizziness, focal weakness, headaches, light-headedness, numbness, seizures and weakness.   Psychiatric/Behavioral: Negative for depression, memory loss and substance abuse. The patient does not have insomnia.    Allergic/Immunologic: Negative for HIV exposure and hives.       Objective:   Physical Exam   Constitutional: He is oriented to person, place, and time. He appears well-nourished.   HENT:   Head: Normocephalic.   Eyes: Pupils are equal, round, and reactive to light.   Neck: Normal carotid pulses and no JVD present. Carotid bruit is not present. No thyromegaly present.   Cardiovascular: Normal rate, regular rhythm, normal heart sounds and normal pulses.  No extrasystoles are present. PMI is not displaced. Exam reveals no gallop and no S3.   No murmur heard.  Pulmonary/Chest: Breath sounds normal. No stridor. No respiratory distress.   Abdominal: Soft. Bowel sounds are normal. There is no tenderness. There is no rebound.    Musculoskeletal: Normal range of motion.   Neurological: He is alert and oriented to person, place, and time.   Skin: Skin is intact. No rash noted.   Psychiatric: His behavior is normal.       Lab Results   Component Value Date    CHOL 186 10/23/2018    CHOL 178 09/06/2017    CHOL 144 04/26/2016     Lab Results   Component Value Date    HDL 49 10/23/2018    HDL 48 09/06/2017    HDL 41 04/26/2016     Lab Results   Component Value Date    LDLCALC 118.4 10/23/2018    LDLCALC 112.0 09/06/2017    LDLCALC 83.2 04/26/2016     Lab Results   Component Value Date    TRIG 93 10/23/2018    TRIG 90 09/06/2017    TRIG 99 04/26/2016     Lab Results   Component Value Date    CHOLHDL 26.3 10/23/2018    CHOLHDL 27.0 09/06/2017    CHOLHDL 28.5 04/26/2016       Chemistry        Component Value Date/Time     06/03/2019 1044    K 3.7 06/03/2019 1044     06/03/2019 1044    CO2 28 06/03/2019 1044    BUN 23 06/03/2019 1044    CREATININE 1.1 06/03/2019 1044     06/03/2019 1044        Component Value Date/Time    CALCIUM 9.7 06/03/2019 1044    ALKPHOS 54 (L) 06/03/2019 1044    AST 21 06/03/2019 1044    ALT 15 06/03/2019 1044    BILITOT 0.4 06/03/2019 1044    ESTGFRAFRICA >60.0 06/03/2019 1044    EGFRNONAA 57.2 (A) 06/03/2019 1044          No results found for: LABA1C, HGBA1C  Lab Results   Component Value Date    TSH 3.048 04/26/2016     Lab Results   Component Value Date    INR 0.9 02/01/2019    INR 1.1 08/30/2015     Lab Results   Component Value Date    WBC 7.61 06/03/2019    HGB 11.8 (L) 06/03/2019    HCT 36.1 (L) 06/03/2019    MCV 94 06/03/2019     06/03/2019     BMP  Sodium   Date Value Ref Range Status   06/03/2019 143 136 - 145 mmol/L Final     Potassium   Date Value Ref Range Status   06/03/2019 3.7 3.5 - 5.1 mmol/L Final     Chloride   Date Value Ref Range Status   06/03/2019 104 95 - 110 mmol/L Final     CO2   Date Value Ref Range Status   06/03/2019 28 23 - 29 mmol/L Final     BUN, Bld   Date Value Ref  Range Status   06/03/2019 23 10 - 30 mg/dL Final     Creatinine   Date Value Ref Range Status   06/03/2019 1.1 0.5 - 1.4 mg/dL Final     Calcium   Date Value Ref Range Status   06/03/2019 9.7 8.7 - 10.5 mg/dL Final     Anion Gap   Date Value Ref Range Status   06/03/2019 11 8 - 16 mmol/L Final     eGFR if    Date Value Ref Range Status   06/03/2019 >60.0 >60 mL/min/1.73 m^2 Final     eGFR if non    Date Value Ref Range Status   06/03/2019 57.2 (A) >60 mL/min/1.73 m^2 Final     Comment:     Calculation used to obtain the estimated glomerular filtration  rate (eGFR) is the CKD-EPI equation.        BNP  @LABRCNTIP(BNP,BNPTRIAGEBLO)@  @LABRCNTIP(troponini)@  CrCl cannot be calculated (Patient's most recent lab result is older than the maximum 7 days allowed.).  No results found in the last 24 hours.  No results found in the last 24 hours.  No results found in the last 24 hours.    Assessment:      1. Bradycardia    2. Essential hypertension    3. PAD (peripheral artery disease)    4. Chronic kidney disease (CKD), stage III (moderate)    5. Failure to thrive syndrome, adult    6. Malignant neoplasm of urinary bladder, unspecified site      Per daughter, pt is in the process of hospice evaluation.  In DNR  Had weakness when HR 35 bpm in PCP's office on 06/03. No EKG done. Had HTN med adjustment due to low BP  Bradycardia resolved    Plan:   Advise to discuss hospice issue with PCP and neurology  If pt is qualified for hospice, no neccessary for the further cardiac ebvaluion  RTC as needed  Fall precaution

## 2019-07-03 ENCOUNTER — HOSPITAL ENCOUNTER (EMERGENCY)
Facility: HOSPITAL | Age: 84
Discharge: HOME | End: 2019-07-04
Attending: EMERGENCY MEDICINE
Payer: MEDICARE

## 2019-07-03 DIAGNOSIS — R10.9 ABDOMINAL PAIN: ICD-10-CM

## 2019-07-03 DIAGNOSIS — I10 ESSENTIAL HYPERTENSION: ICD-10-CM

## 2019-07-03 DIAGNOSIS — R51.9 NONINTRACTABLE HEADACHE, UNSPECIFIED CHRONICITY PATTERN, UNSPECIFIED HEADACHE TYPE: Primary | ICD-10-CM

## 2019-07-03 LAB
ALBUMIN SERPL BCP-MCNC: 4.2 G/DL (ref 3.5–5.2)
ALP SERPL-CCNC: 66 U/L (ref 55–135)
ALT SERPL W/O P-5'-P-CCNC: 14 U/L (ref 10–44)
ANION GAP SERPL CALC-SCNC: 13 MMOL/L (ref 8–16)
APTT BLDCRRT: 28.7 SEC (ref 21–32)
AST SERPL-CCNC: 22 U/L (ref 10–40)
BASOPHILS # BLD AUTO: 0.02 K/UL (ref 0–0.2)
BASOPHILS NFR BLD: 0.2 % (ref 0–1.9)
BILIRUB SERPL-MCNC: 0.6 MG/DL (ref 0.1–1)
BUN SERPL-MCNC: 21 MG/DL (ref 10–30)
CALCIUM SERPL-MCNC: 9.9 MG/DL (ref 8.7–10.5)
CHLORIDE SERPL-SCNC: 100 MMOL/L (ref 95–110)
CO2 SERPL-SCNC: 26 MMOL/L (ref 23–29)
CREAT SERPL-MCNC: 1.1 MG/DL (ref 0.5–1.4)
DIFFERENTIAL METHOD: ABNORMAL
EOSINOPHIL # BLD AUTO: 0.1 K/UL (ref 0–0.5)
EOSINOPHIL NFR BLD: 0.8 % (ref 0–8)
ERYTHROCYTE [DISTWIDTH] IN BLOOD BY AUTOMATED COUNT: 13 % (ref 11.5–14.5)
EST. GFR  (AFRICAN AMERICAN): >60 ML/MIN/1.73 M^2
EST. GFR  (NON AFRICAN AMERICAN): 57 ML/MIN/1.73 M^2
GLUCOSE SERPL-MCNC: 101 MG/DL (ref 70–110)
HCT VFR BLD AUTO: 37.6 % (ref 40–54)
HGB BLD-MCNC: 13 G/DL (ref 14–18)
INR PPP: 1 (ref 0.8–1.2)
LIPASE SERPL-CCNC: 67 U/L (ref 4–60)
LYMPHOCYTES # BLD AUTO: 1.9 K/UL (ref 1–4.8)
LYMPHOCYTES NFR BLD: 22.3 % (ref 18–48)
MCH RBC QN AUTO: 30.8 PG (ref 27–31)
MCHC RBC AUTO-ENTMCNC: 34.6 G/DL (ref 32–36)
MCV RBC AUTO: 89 FL (ref 82–98)
MONOCYTES # BLD AUTO: 1.1 K/UL (ref 0.3–1)
MONOCYTES NFR BLD: 12.8 % (ref 4–15)
NEUTROPHILS # BLD AUTO: 5.5 K/UL (ref 1.8–7.7)
NEUTROPHILS NFR BLD: 64 % (ref 38–73)
PLATELET # BLD AUTO: 241 K/UL (ref 150–350)
PMV BLD AUTO: 9.4 FL (ref 9.2–12.9)
POTASSIUM SERPL-SCNC: 3.7 MMOL/L (ref 3.5–5.1)
PROT SERPL-MCNC: 7.9 G/DL (ref 6–8.4)
PROTHROMBIN TIME: 10.4 SEC (ref 9–12.5)
RBC # BLD AUTO: 4.22 M/UL (ref 4.6–6.2)
SODIUM SERPL-SCNC: 139 MMOL/L (ref 136–145)
TROPONIN I SERPL DL<=0.01 NG/ML-MCNC: 0.01 NG/ML (ref 0–0.03)
WBC # BLD AUTO: 8.67 K/UL (ref 3.9–12.7)

## 2019-07-03 PROCEDURE — 85610 PROTHROMBIN TIME: CPT

## 2019-07-03 PROCEDURE — 96375 TX/PRO/DX INJ NEW DRUG ADDON: CPT

## 2019-07-03 PROCEDURE — 85730 THROMBOPLASTIN TIME PARTIAL: CPT

## 2019-07-03 PROCEDURE — 93010 ELECTROCARDIOGRAM REPORT: CPT | Mod: ,,, | Performed by: INTERNAL MEDICINE

## 2019-07-03 PROCEDURE — 81000 URINALYSIS NONAUTO W/SCOPE: CPT

## 2019-07-03 PROCEDURE — 80053 COMPREHEN METABOLIC PANEL: CPT

## 2019-07-03 PROCEDURE — 96374 THER/PROPH/DIAG INJ IV PUSH: CPT

## 2019-07-03 PROCEDURE — 85025 COMPLETE CBC W/AUTO DIFF WBC: CPT

## 2019-07-03 PROCEDURE — 93005 ELECTROCARDIOGRAM TRACING: CPT

## 2019-07-03 PROCEDURE — 63600175 PHARM REV CODE 636 W HCPCS: Performed by: EMERGENCY MEDICINE

## 2019-07-03 PROCEDURE — 93010 EKG 12-LEAD: ICD-10-PCS | Mod: ,,, | Performed by: INTERNAL MEDICINE

## 2019-07-03 PROCEDURE — 84484 ASSAY OF TROPONIN QUANT: CPT

## 2019-07-03 PROCEDURE — 83690 ASSAY OF LIPASE: CPT

## 2019-07-03 RX ORDER — ONDANSETRON 2 MG/ML
4 INJECTION INTRAMUSCULAR; INTRAVENOUS
Status: COMPLETED | OUTPATIENT
Start: 2019-07-03 | End: 2019-07-03

## 2019-07-03 RX ORDER — MORPHINE SULFATE 4 MG/ML
2 INJECTION, SOLUTION INTRAMUSCULAR; INTRAVENOUS
Status: COMPLETED | OUTPATIENT
Start: 2019-07-03 | End: 2019-07-03

## 2019-07-03 RX ADMIN — MORPHINE SULFATE 2 MG: 4 INJECTION INTRAVENOUS at 11:07

## 2019-07-03 RX ADMIN — ONDANSETRON 4 MG: 2 INJECTION INTRAMUSCULAR; INTRAVENOUS at 11:07

## 2019-07-04 ENCOUNTER — NURSE TRIAGE (OUTPATIENT)
Dept: ADMINISTRATIVE | Facility: CLINIC | Age: 84
End: 2019-07-04

## 2019-07-04 VITALS
SYSTOLIC BLOOD PRESSURE: 140 MMHG | HEART RATE: 92 BPM | HEIGHT: 65 IN | TEMPERATURE: 99 F | OXYGEN SATURATION: 98 % | BODY MASS INDEX: 23.63 KG/M2 | RESPIRATION RATE: 17 BRPM | DIASTOLIC BLOOD PRESSURE: 79 MMHG

## 2019-07-04 LAB
BACTERIA #/AREA URNS HPF: ABNORMAL /HPF
BILIRUB UR QL STRIP: NEGATIVE
CLARITY UR: CLEAR
COLOR UR: YELLOW
GLUCOSE UR QL STRIP: NEGATIVE
HGB UR QL STRIP: ABNORMAL
HYALINE CASTS #/AREA URNS LPF: 0 /LPF
KETONES UR QL STRIP: NEGATIVE
LEUKOCYTE ESTERASE UR QL STRIP: NEGATIVE
MICROSCOPIC COMMENT: ABNORMAL
NITRITE UR QL STRIP: NEGATIVE
PH UR STRIP: 8 [PH] (ref 5–8)
PROT UR QL STRIP: ABNORMAL
RBC #/AREA URNS HPF: 24 /HPF (ref 0–4)
SP GR UR STRIP: 1.01 (ref 1–1.03)
URN SPEC COLLECT METH UR: ABNORMAL
UROBILINOGEN UR STRIP-ACNC: NEGATIVE EU/DL
WBC #/AREA URNS HPF: 0 /HPF (ref 0–5)

## 2019-07-04 PROCEDURE — 96375 TX/PRO/DX INJ NEW DRUG ADDON: CPT

## 2019-07-04 PROCEDURE — 99285 EMERGENCY DEPT VISIT HI MDM: CPT | Mod: 25

## 2019-07-04 PROCEDURE — 63600175 PHARM REV CODE 636 W HCPCS: Performed by: EMERGENCY MEDICINE

## 2019-07-04 PROCEDURE — 25000003 PHARM REV CODE 250: Performed by: EMERGENCY MEDICINE

## 2019-07-04 RX ORDER — KETOROLAC TROMETHAMINE 30 MG/ML
15 INJECTION, SOLUTION INTRAMUSCULAR; INTRAVENOUS
Status: COMPLETED | OUTPATIENT
Start: 2019-07-04 | End: 2019-07-04

## 2019-07-04 RX ORDER — HYDRALAZINE HYDROCHLORIDE 20 MG/ML
10 INJECTION INTRAMUSCULAR; INTRAVENOUS
Status: COMPLETED | OUTPATIENT
Start: 2019-07-04 | End: 2019-07-04

## 2019-07-04 RX ORDER — LORAZEPAM 0.5 MG/1
0.5 TABLET ORAL
Status: COMPLETED | OUTPATIENT
Start: 2019-07-04 | End: 2019-07-04

## 2019-07-04 RX ADMIN — LORAZEPAM 0.5 MG: 0.5 TABLET ORAL at 04:07

## 2019-07-04 RX ADMIN — HYDRALAZINE HYDROCHLORIDE 10 MG: 20 INJECTION, SOLUTION INTRAMUSCULAR; INTRAVENOUS at 03:07

## 2019-07-04 RX ADMIN — KETOROLAC TROMETHAMINE 15 MG: 30 INJECTION, SOLUTION INTRAMUSCULAR; INTRAVENOUS at 04:07

## 2019-07-04 NOTE — ED NOTES
The pt is resting arlene his right side resting with his eyes closed in no apparent distress at this time. Family at bedside

## 2019-07-04 NOTE — ED NOTES
the pt was helped to the lounge chair for more comfort because he was unable to get comfortable in the bed. The pt is resting with his eyes closed

## 2019-07-04 NOTE — ED NOTES
In and out was performed using sterile technique to obtain a urine specimen. ASHLEY Razo also present during cath.

## 2019-07-04 NOTE — TELEPHONE ENCOUNTER
Reason for Disposition   Difficult to awaken or acting confused  (e.g., disoriented, slurred speech)    Protocols used: ST WEAKNESS (GENERALIZED) AND FATIGUE-A-    Pt's daughter states pt was seen in ED yesterday. Daughter states pt continues to appear tired and disoriented. Daughter advised per protocol and daughter verbalizes understanding. Daughter refuses disposition at this time and states pt may be placed on hospice.

## 2019-07-04 NOTE — ED PROVIDER NOTES
SCRIBE #1 NOTE: I, Kaylynn Stanley, am scribing for, and in the presence of, Nathalie Sosa MD. I have scribed the entire note.      History      Chief Complaint   Patient presents with    Abdominal Pain     Patient reports having abdominal pain, deneis any other s/s    Headache     Patient reports having headache at this time       Review of patient's allergies indicates:   Allergen Reactions    Baycol [cerivastatin]     Codeine     Norvasc [amlodipine]     Sular [nisoldipine]         HPI   HPI    7/3/2019, 11:11 PM   History obtained from the family members      History of Present Illness: Coy López is a 89 y.o. male patient who presents to the Emergency Department for headache which onset gradually today. Symptoms are constant and moderate in severity. No mitigating or exacerbating factors reported. Associated sxs include abdominal pain. Pt's family members state that the patient is also c/o of throat pain that has been intermittent for a few years now. Patient's daughter denies any fever, chills, emesis, diarrhea, constipation, cough, rhinorrhea, rash, syncopal episodes, voice change, and all other sxs at this time. No prior Tx. No further complaints or concerns at this time.         Arrival mode: Personal vehicle      PCP: Roscoe Boyd MD       Past Medical History:  Past Medical History:   Diagnosis Date    Bladder cancer 2009    Coronary artery disease     Degenerative arthritis of lumbar spine     Hyperlipidemia     Hypertension     LBP (low back pain)     Prostate CA 1994    Renal failure 2006    Right leg pain     TIA (transient ischemic attack) 1980       Past Surgical History:  Past Surgical History:   Procedure Laterality Date    ADENOIDECTOMY      bladder cancer  2009    FOOT SURGERY      PROSTATE SURGERY  1994    TONSILLECTOMY           Family History:  Family History   Problem Relation Age of Onset    Kidney failure Mother         POST OP GB    Stroke Father      Diabetes Sister     Hypertension Brother     Heart attack Brother     Nephrolithiasis Son        Social History:  Social History     Tobacco Use    Smoking status: Former Smoker     Years: 10.00     Last attempt to quit: 1961     Years since quittin.4    Smokeless tobacco: Never Used   Substance and Sexual Activity    Alcohol use: No     Alcohol/week: 0.0 oz    Drug use: No    Sexual activity: Not Currently       ROS   Review of Systems   Constitutional: Negative for chills and fever.   HENT: Positive for sore throat. Negative for rhinorrhea and voice change.    Respiratory: Negative for cough and shortness of breath.    Cardiovascular: Negative for chest pain.   Gastrointestinal: Positive for abdominal pain. Negative for constipation, diarrhea, nausea and vomiting.   Genitourinary: Negative for dysuria.   Musculoskeletal: Negative for back pain.   Skin: Negative for rash.   Neurological: Positive for headaches. Negative for syncope and weakness.   Hematological: Does not bruise/bleed easily.   All other systems reviewed and are negative.    Physical Exam      Initial Vitals [19 2244]   BP Pulse Resp Temp SpO2   (!) 215/88 67 18 97.6 °F (36.4 °C) 98 %      MAP       --          Physical Exam  Nursing Notes and Vital Signs Reviewed.  Constitutional: Patient is in no acute distress. Well-developed and well-nourished.  Head: Atraumatic. Normocephalic.  Eyes: PERRL. EOM intact. Conjunctivae are not pale. No scleral icterus.  ENT: Mucous membranes are moist. Oropharynx is clear and symmetric.    Neck: Supple. Full ROM. No lymphadenopathy.  Cardiovascular: Regular rate. Regular rhythm. No murmurs, rubs, or gallops. Distal pulses are 2+ and symmetric.  Pulmonary/Chest: No respiratory distress. Clear to auscultation bilaterally. No wheezing or rales.  Abdominal: Soft and non-distended.  There is no tenderness.  No rebound, guarding, or rigidity. Good bowel sounds.  Genitourinary: No CVA  "tenderness  Musculoskeletal: Moves all extremities. No obvious deformities. No edema. No calf tenderness.  Skin: Warm and dry.  Neurological:  Alert, awake, and appropriate.  Normal speech.  No acute focal neurological deficits are appreciated.  Psychiatric: Normal affect. Good eye contact. Appropriate in content.    ED Course    Procedures  ED Vital Signs:  Vitals:    07/03/19 2244 07/03/19 2310 07/03/19 2320 07/03/19 2330   BP: (!) 215/88 (!) 238/114  (!) 241/105   Pulse: 67 84 87 84   Resp: 18 (!) 24  18   Temp: 97.6 °F (36.4 °C)      TempSrc: Oral      SpO2: 98% 100%  99%   Height: 5' 5" (1.651 m)       07/04/19 0030 07/04/19 0100 07/04/19 0130 07/04/19 0230   BP: (!) 201/84 (!) 205/85 (!) 200/81 (!) 239/105   Pulse: 69 72 72 82   Resp: 16 15 15 18   Temp:       TempSrc:       SpO2: 98% 98% 98% 98%   Height:        07/04/19 0300 07/04/19 0400 07/04/19 0500 07/04/19 0558   BP: (!) 197/82 (!) 178/81 (!) 148/63 (!) 140/79   Pulse: 74 110 92    Resp: 16 17 17 17   Temp:   98.5 °F (36.9 °C) 98.9 °F (37.2 °C)   TempSrc:   Oral Oral   SpO2: 98% 100% 98%    Height:           Abnormal Lab Results:  Labs Reviewed   CBC W/ AUTO DIFFERENTIAL - Abnormal; Notable for the following components:       Result Value    RBC 4.22 (*)     Hemoglobin 13.0 (*)     Hematocrit 37.6 (*)     Mono # 1.1 (*)     All other components within normal limits   COMPREHENSIVE METABOLIC PANEL - Abnormal; Notable for the following components:    eGFR if non  57 (*)     All other components within normal limits   URINALYSIS, REFLEX TO URINE CULTURE - Abnormal; Notable for the following components:    Protein, UA 1+ (*)     Occult Blood UA 2+ (*)     All other components within normal limits    Narrative:     Preferred Collection Type->Urine, Clean Catch   LIPASE - Abnormal; Notable for the following components:    Lipase 67 (*)     All other components within normal limits   URINALYSIS MICROSCOPIC - Abnormal; Notable for the following " components:    RBC, UA 24 (*)     All other components within normal limits    Narrative:     Preferred Collection Type->Urine, Clean Catch   PROTIME-INR   APTT   TROPONIN I        All Lab Results:  Results for orders placed or performed during the hospital encounter of 07/03/19   CBC auto differential   Result Value Ref Range    WBC 8.67 3.90 - 12.70 K/uL    RBC 4.22 (L) 4.60 - 6.20 M/uL    Hemoglobin 13.0 (L) 14.0 - 18.0 g/dL    Hematocrit 37.6 (L) 40.0 - 54.0 %    Mean Corpuscular Volume 89 82 - 98 fL    Mean Corpuscular Hemoglobin 30.8 27.0 - 31.0 pg    Mean Corpuscular Hemoglobin Conc 34.6 32.0 - 36.0 g/dL    RDW 13.0 11.5 - 14.5 %    Platelets 241 150 - 350 K/uL    MPV 9.4 9.2 - 12.9 fL    Gran # (ANC) 5.5 1.8 - 7.7 K/uL    Lymph # 1.9 1.0 - 4.8 K/uL    Mono # 1.1 (H) 0.3 - 1.0 K/uL    Eos # 0.1 0.0 - 0.5 K/uL    Baso # 0.02 0.00 - 0.20 K/uL    Gran% 64.0 38.0 - 73.0 %    Lymph% 22.3 18.0 - 48.0 %    Mono% 12.8 4.0 - 15.0 %    Eosinophil% 0.8 0.0 - 8.0 %    Basophil% 0.2 0.0 - 1.9 %    Differential Method Automated    Comprehensive metabolic panel   Result Value Ref Range    Sodium 139 136 - 145 mmol/L    Potassium 3.7 3.5 - 5.1 mmol/L    Chloride 100 95 - 110 mmol/L    CO2 26 23 - 29 mmol/L    Glucose 101 70 - 110 mg/dL    BUN, Bld 21 10 - 30 mg/dL    Creatinine 1.1 0.5 - 1.4 mg/dL    Calcium 9.9 8.7 - 10.5 mg/dL    Total Protein 7.9 6.0 - 8.4 g/dL    Albumin 4.2 3.5 - 5.2 g/dL    Total Bilirubin 0.6 0.1 - 1.0 mg/dL    Alkaline Phosphatase 66 55 - 135 U/L    AST 22 10 - 40 U/L    ALT 14 10 - 44 U/L    Anion Gap 13 8 - 16 mmol/L    eGFR if African American >60 >60 mL/min/1.73 m^2    eGFR if non African American 57 (A) >60 mL/min/1.73 m^2   Protime-INR   Result Value Ref Range    Prothrombin Time 10.4 9.0 - 12.5 sec    INR 1.0 0.8 - 1.2   APTT   Result Value Ref Range    aPTT 28.7 21.0 - 32.0 sec   Troponin I   Result Value Ref Range    Troponin I 0.015 0.000 - 0.026 ng/mL   Urinalysis, Reflex to Urine Culture  Urine, Clean Catch   Result Value Ref Range    Specimen UA Urine, Catheterized     Color, UA Yellow Yellow, Straw, Latoya    Appearance, UA Clear Clear    pH, UA 8.0 5.0 - 8.0    Specific Gravity, UA 1.015 1.005 - 1.030    Protein, UA 1+ (A) Negative    Glucose, UA Negative Negative    Ketones, UA Negative Negative    Bilirubin (UA) Negative Negative    Occult Blood UA 2+ (A) Negative    Nitrite, UA Negative Negative    Urobilinogen, UA Negative <2.0 EU/dL    Leukocytes, UA Negative Negative   Lipase   Result Value Ref Range    Lipase 67 (H) 4 - 60 U/L   Urinalysis Microscopic   Result Value Ref Range    RBC, UA 24 (H) 0 - 4 /hpf    WBC, UA 0 0 - 5 /hpf    Bacteria None None-Occ /hpf    Hyaline Casts, UA 0 0-1/lpf /lpf    Microscopic Comment SEE COMMENT          Imaging Results:  Imaging Results          CT Head Without Contrast (Final result)  Result time 07/04/19 08:25:08    Final result by Julio Abbott Jr., MD (07/04/19 08:25:08)                 Impression:      1. 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  Consider correlation with MRI if clinically indicated.  All CT scans at this facility are performed  using dose modulation techniques as appropriate to performed exam including the following:  automated exposure control; adjustment of mA and/or kV according to the patients size (this includes techniques or standardized protocols for targeted exams where dose is matched to indication/reason for exam: i.e. extremities or head);  iterative reconstruction technique.      Electronically signed by: Julio Abbott Jr., MD  Date:    07/04/2019  Time:    08:25             Narrative:    EXAMINATION:  CT HEAD WITHOUT CONTRAST    CLINICAL HISTORY:  Headache, acute, norm neuro exam;    TECHNIQUE:  CT scan was obtained of the head without administration of contrast.    COMPARISON:  02/01/2019    FINDINGS:  Small region of transcortical hypo density in the  anterior aspect of the left precentral gyrus best seen on image 22 series 2.  Stable atrophy with periventricular white matter changes consistent with small vessel ischemic change.  No extra-axial acute fluid collection.  Study is limited due to motion.Ventricles and basal cisterns are normal.  No hemorrhage, mass effect or midline shift.  No other cerebral or cerebellar parenchymal abnormality.  Paranasal sinuses are clear.  Mastoid air cells are clear.  Calvarium is intact.                               12:26 AM: Per STAT radiology, pt's CT Head Without Contrast results:   1. Small region of transcortical hypodensity at anterior aspect of left precentral gyrus (image 22, series 2), This is age indeterminant and may be chronic, but small acute or subacute infarct not excluded.  2. No acute intracranial hemorrhage.  3. White matter hypodensities, most likely representing small vessel ischemic change. Global cerebral volume loss.         The EKG was ordered, reviewed, and independently interpreted by the ED provider.  Interpretation time: 23:31  Rate: 79 BPM  Rhythm: normal sinus rhythm  Interpretation: Left ventricular hypertrophy with repolarization abnormality. No STEMI.      The Emergency Provider reviewed the vital signs and test results, which are outlined above.    ED Discussion     5:48 AM: Reassessed pt at this time.  Pt states his condition has improved at this time. Discussed with pt all pertinent ED information and results. Discussed pt dx and plan of tx. Gave pt all f/u and return to the ED instructions. All questions and concerns were addressed at this time. Pt expresses understanding of information and instructions, and is comfortable with plan to discharge. Pt is stable for discharge.    I discussed with patient and/or family/caretaker that evaluation in the ED does not suggest any emergent or life threatening medical conditions requiring immediate intervention beyond what was provided in the ED, and  I believe patient is safe for discharge.  Regardless, an unremarkable evaluation in the ED does not preclude the development or presence of a serious of life threatening condition. As such, patient was instructed to return immediately for any worsening or change in current symptoms.    ED Medication(s):  Medications   ondansetron injection 4 mg (4 mg Intravenous Given 7/3/19 2329)   morphine injection 2 mg (2 mg Intravenous Given 7/3/19 2329)   hydrALAZINE injection 10 mg (10 mg Intravenous Given 7/4/19 7657)   LORazepam tablet 0.5 mg (0.5 mg Oral Given 7/4/19 5676)   ketorolac injection 15 mg (15 mg Intravenous Given 7/4/19 8039)     Current Discharge Medication List          Follow-up Information     Roscoe Boyd MD In 1 day.    Specialty:  Family Medicine  Contact information:  [address]  [place] [address]  [phone]             [facility]    Specialty:  Emergency Medicine  Why:  As needed, If symptoms worsen  Contact information:  [id number] [place] [id number] [phone]                   Medical Decision Making    Medical Decision Making:   Clinical Tests:   Lab Tests: Ordered and Reviewed  Radiological Study: Ordered and Reviewed  Medical Tests: Ordered and Reviewed           Scribe Attestation:   Scribe #1: I performed the above scribed service and the documentation accurately describes the services I performed. I attest to the accuracy of the note.    Attending:   Physician Attestation Statement for Scribe #1: I, Nathalie Sosa MD, personally performed the services described in this documentation, as scribed by Kaylynn Stanley, in my presence, and it is both accurate and complete.          Clinical Impression       ICD-10-CM ICD-9-CM   1. Nonintractable headache, unspecified chronicity pattern, unspecified headache type R51 784.0   2. Abdominal pain R10.9 789.00   3. Essential hypertension I10 401.9       Disposition:   Disposition:  Discharged  Condition: Stable         Nathalie Sosa MD  07/04/19 7208       Nathalie Sosa MD  07/13/19 6439

## 2019-07-05 ENCOUNTER — TELEPHONE (OUTPATIENT)
Dept: CARDIOLOGY | Facility: CLINIC | Age: 84
End: 2019-07-05

## 2019-07-05 NOTE — TELEPHONE ENCOUNTER
Spoke with pt and faxed over Dr Knight's last clinic note and Dr Thomas's hospice order to Maryan at      ----- Message from Alma Richards sent at 7/5/2019  8:00 AM CDT -----  Contact: [facility]  Requesting a call back regarding patient. She states that the patient is requesting hospice.  Would like last clinic notes and an order to admit faxed to [phone]. Please call back at [phone].

## 2019-07-08 ENCOUNTER — HOSPITAL ENCOUNTER (EMERGENCY)
Facility: HOSPITAL | Age: 84
Discharge: HOME | End: 2019-07-08
Attending: EMERGENCY MEDICINE
Payer: MEDICARE

## 2019-07-08 VITALS
OXYGEN SATURATION: 97 % | RESPIRATION RATE: 21 BRPM | HEIGHT: 64 IN | SYSTOLIC BLOOD PRESSURE: 223 MMHG | DIASTOLIC BLOOD PRESSURE: 95 MMHG | TEMPERATURE: 98 F | WEIGHT: 141 LBS | HEART RATE: 89 BPM | BODY MASS INDEX: 24.07 KG/M2

## 2019-07-08 DIAGNOSIS — S61.219A FINGER LACERATION, INITIAL ENCOUNTER: Primary | ICD-10-CM

## 2019-07-08 PROCEDURE — 63600175 PHARM REV CODE 636 W HCPCS: Performed by: EMERGENCY MEDICINE

## 2019-07-08 PROCEDURE — 96374 THER/PROPH/DIAG INJ IV PUSH: CPT | Mod: 59

## 2019-07-08 PROCEDURE — 25000003 PHARM REV CODE 250: Performed by: EMERGENCY MEDICINE

## 2019-07-08 PROCEDURE — 99284 EMERGENCY DEPT VISIT MOD MDM: CPT | Mod: 25

## 2019-07-08 PROCEDURE — 12002 RPR S/N/AX/GEN/TRNK2.6-7.5CM: CPT

## 2019-07-08 RX ORDER — LIDOCAINE HYDROCHLORIDE 10 MG/ML
10 INJECTION, SOLUTION EPIDURAL; INFILTRATION; INTRACAUDAL; PERINEURAL
Status: COMPLETED | OUTPATIENT
Start: 2019-07-08 | End: 2019-07-08

## 2019-07-08 RX ORDER — HALOPERIDOL 5 MG/ML
5 INJECTION INTRAMUSCULAR
Status: COMPLETED | OUTPATIENT
Start: 2019-07-08 | End: 2019-07-08

## 2019-07-08 RX ADMIN — LIDOCAINE HYDROCHLORIDE 100 MG: 10 INJECTION, SOLUTION EPIDURAL; INFILTRATION; INTRACAUDAL; PERINEURAL at 05:07

## 2019-07-08 RX ADMIN — HALOPERIDOL LACTATE 5 MG: 5 INJECTION, SOLUTION INTRAMUSCULAR at 05:07

## 2019-07-08 RX ADMIN — NEOMYCIN AND POLYMYXIN B SULFATES AND BACITRACIN ZINC: 400; 3.5; 5 OINTMENT TOPICAL at 06:07

## 2019-07-08 NOTE — ED PROVIDER NOTES
SCRIBE #1 NOTE: I, Azul Prieto, am scribing for, and in the presence of, Gianni Koenig MD. I have scribed the entire note.       History     Chief Complaint   Patient presents with    Finger Injury     family is unaware how the pt injured his fiftth digit.      Review of patient's allergies indicates:   Allergen Reactions    Baycol [cerivastatin]     Codeine     Norvasc [amlodipine]     Sular [nisoldipine]          History of Present Illness     HPI    7/8/2019, 5:24 AM  History obtained from the family      History of Present Illness: Coy López is a 89 y.o. male patient with a PMHx of HLD, TIA, renal failure, HTN, and CAD who presents to the Emergency Department for evaluation of a L 5th digit injury which onset a few hours ago. Family states they are unaware how pt injured his finger. Symptoms are constant and moderate in severity. No mitigating or exacerbating factors reported. Associated sxs include L 5th digit laceration. Patient denies any fever, chills, n/v, decreased ROM, dizziness, extremity weakness/numbness, and all other sxs at this time. No prior Tx reported. No further complaints or concerns at this time.       Arrival mode: Personal vehicle    PCP: Roscoe Boyd MD        Past Medical History:  Past Medical History:   Diagnosis Date    Bladder cancer 2009    Coronary artery disease     Degenerative arthritis of lumbar spine     Hyperlipidemia     Hypertension     LBP (low back pain)     Prostate CA 1994    Renal failure 2006    Right leg pain     TIA (transient ischemic attack) 1980       Past Surgical History:  Past Surgical History:   Procedure Laterality Date    ADENOIDECTOMY      bladder cancer  2009    FOOT SURGERY      PROSTATE SURGERY  1994    TONSILLECTOMY           Family History:  Family History   Problem Relation Age of Onset    Kidney failure Mother         POST OP GB    Stroke Father     Diabetes Sister     Hypertension Brother     Heart  attack Brother     Nephrolithiasis Son        Social History:  Social History     Tobacco Use    Smoking status: Former Smoker     Years: 10.00     Last attempt to quit: 1961     Years since quittin.4    Smokeless tobacco: Never Used   Substance and Sexual Activity    Alcohol use: No     Alcohol/week: 0.0 oz    Drug use: No    Sexual activity: Not Currently        Review of Systems     Review of Systems   Constitutional: Negative for chills and fever.   HENT: Negative for rhinorrhea and sore throat.    Respiratory: Negative for cough and shortness of breath.    Gastrointestinal: Negative for abdominal pain, diarrhea, nausea and vomiting.   Genitourinary: Negative for dysuria, frequency and urgency.   Musculoskeletal:        (+) L 5th digit pain  (-) decreased ROM   Skin: Positive for wound (L 5th digit).   Neurological: Negative for dizziness, weakness and numbness (or tingling).   All other systems reviewed and are negative.       Physical Exam     Initial Vitals [19 0344]   BP Pulse Resp Temp SpO2   (!) 217/92 81 17 98.4 °F (36.9 °C) 98 %      MAP       --          Physical Exam  Nursing Notes and Vital Signs Reviewed.  Constitutional: Patient is in no acute distress. Well-developed and well-nourished.  Head: Atraumatic. Normocephalic.  Eyes: PERRL. EOM intact. Conjunctivae are not pale. No scleral icterus.  ENT: Mucous membranes are moist. Oropharynx is clear and symmetric.    Neck: Supple. Full ROM. No lymphadenopathy.  Cardiovascular: Regular rate. Regular rhythm. No murmurs, rubs, or gallops. Distal pulses are 2+ and symmetric.  Pulmonary/Chest: No respiratory distress. Clear to auscultation bilaterally. No wheezing or rales.  Abdominal: Soft and non-distended.  There is no tenderness.  No rebound, guarding, or rigidity.   Musculoskeletal: Moves all extremities. No obvious deformities. No edema. No calf tenderness.  Left Hand: No obvious deformity. 2 stellate finger lacerations (1.5 cm  "each) of L 5th finger. No foreign bodies. Full flexion and extension of the wrist. Radial, median, and ulnar nerves are intact. Radial and ulnar pulses are 2+. Normal capillary refill.  Distal sensation is intact. NVI distally.   Skin: Warm and dry.  Neurological:  Alert, awake, and appropriate.  Normal speech.  No acute focal neurological deficits are appreciated.  Psychiatric: Normal affect. Good eye contact. Appropriate in content.     ED Course   Lac Repair  Date/Time: 2019 5:30 AM  Performed by: Gianni Koenig MD  Authorized by: Gianni Keonig MD   Consent Done: Yes  Consent: Verbal consent obtained.  Risks and benefits: risks, benefits and alternatives were discussed  Consent given by: daughter.  Patient understanding: patient states understanding of the procedure being performed  Patient consent: the patient's understanding of the procedure matches consent given  Procedure consent: procedure consent matches procedure scheduled  Relevant documents: relevant documents present and verified  Test results: test results available and properly labeled  Site marked: the operative site was marked  Required items: required blood products, implants, devices, and special equipment available  Patient identity confirmed:  and name  Time out: Immediately prior to procedure a "time out" was called to verify the correct patient, procedure, equipment, support staff and site/side marked as required.  Body area: upper extremity  Location details: left small finger  Laceration length: 3 (2) cm  Foreign bodies: no foreign bodies  Tendon involvement: none  Nerve involvement: none  Vascular damage: no  Anesthesia: digital block    Anesthesia:  Local Anesthetic: lidocaine 1% without epinephrine  Patient sedated: no  Preparation: Patient was prepped and draped in the usual sterile fashion.  Irrigation solution: saline  Amount of cleaning: standard  Debridement: none  Degree of undermining: none  Skin closure: Ethilon " "(3-0)  Number of sutures: 6 (3 each)  Technique: simple  Approximation difficulty: simple  Patient tolerance: Patient tolerated the procedure well with no immediate complications        ED Vital Signs:  Vitals:    07/08/19 0344 07/08/19 0600   BP: (!) 217/92 (!) 223/95   Pulse: 81 89   Resp: 17 (!) 21   Temp: 98.4 °F (36.9 °C)    TempSrc: Oral    SpO2: 98% 97%   Weight: 64 kg (141 lb)    Height: 5' 4" (1.626 m)        Abnormal Lab Results:  Labs Reviewed - No data to display     Imaging Results:  Imaging Results          X-Ray Finger 2 or More Views Left (Final result)  Result time 07/08/19 08:01:51    Final result by Gold Holman MD (07/08/19 08:01:51)                 Impression:      See above.      Electronically signed by: Gold Holman MD  Date:    07/08/2019  Time:    08:01             Narrative:    EXAMINATION:  XR FINGER 2 OR MORE VIEWS LEFT    CLINICAL HISTORY:  XR FINGER 2 OR MORE VIEWS LEFT    COMPARISON:  None    FINDINGS:  Three views of the left 5th digit were obtained.    No definite acute fracture or dislocation.  Small avulsion at the at the distal aspect of the middle phalanx of the 5th digit is not entirely excluded.  Diffuse osteopenia and moderate soft tissue swelling.  Laceration is seen at the tip of the 5th digit.                              Per physician's interpretation, pt's XRay results: Soft tissue injury.           The Emergency Provider reviewed the vital signs and test results, which are outlined above.     ED Discussion     6:07 AM: Reassessed pt at this time. Discussed with pt all pertinent ED information and results. Discussed pt dx and plan of tx. Gave pt all f/u and return to the ED instructions. All questions and concerns were addressed at this time. Pt expresses understanding of information and instructions, and is comfortable with plan to discharge. Pt is stable for discharge.    I discussed wound care precautions with patient and/or family/caretaker; specifically that " all wounds have risk of infection despite efforts to cleanse and debride the wound; and there is a risk of an occult foreign body (and thus increased risk of infection) despite a negative examination.  I discussed with patient need to return for any signs of infection, specifically redness, increased pain, fever, drainage of pus, or any concern, immediately.    ED Medication(s):  Medications   lidocaine (PF) 10 mg/ml (1%) injection 100 mg (100 mg Infiltration Given by Other 7/8/19 0598)   haloperidol lactate injection 5 mg (5 mg Intravenous Given 7/8/19 0501)   neomycin-bacitracnZn-polymyxnB packet ( Topical (Top) Given 7/8/19 0600)       Discharge Medication List as of 7/8/2019  6:04 AM                    Medical Decision Making:   Clinical Tests:   Radiological Study: Ordered and Reviewed             Scribe Attestation:   Scribe #1: I performed the above scribed service and the documentation accurately describes the services I performed. I attest to the accuracy of the note.     Attending:   Physician Attestation Statement for Scribe #1: I, Gianni Koenig MD, personally performed the services described in this documentation, as scribed by Azul Prieto, in my presence, and it is both accurate and complete.           Clinical Impression       ICD-10-CM ICD-9-CM   1. Finger laceration, initial encounter S61.219A 883.0       Disposition:   Disposition: Discharged  Condition: Stable         Gianni Koenig MD  07/08/19 1007

## 2019-07-08 NOTE — ED NOTES
Cleansed patient left pinky finger with NS. Patient hallucinating and mumbling. Patient family at bedside and states patient is on hospice and unaware of how injury happened to finger. Left pinky finger noted with two lacerations to tip of finger. Applied gauze and awaiting doctor to eval.

## 2019-07-08 NOTE — ED NOTES
Patient hollering in room at family. Patient telling family he is seeing things and talking not making sense. Notified Dr. Koenig of patient hallucinating and if he would like to order something to calm patient. Notified Dr. Carter patient is taking PO ativan at home hospice and last dose was at 730pm.

## 2019-07-09 ENCOUNTER — PATIENT MESSAGE (OUTPATIENT)
Dept: INTERNAL MEDICINE | Facility: CLINIC | Age: 84
End: 2019-07-09

## 2019-07-09 ENCOUNTER — TELEPHONE (OUTPATIENT)
Dept: INTERNAL MEDICINE | Facility: CLINIC | Age: 84
End: 2019-07-09

## 2019-07-09 DIAGNOSIS — R00.1 BRADYCARDIA: ICD-10-CM

## 2019-07-09 DIAGNOSIS — R53.1 WEAKNESS: Primary | ICD-10-CM

## 2019-07-09 NOTE — TELEPHONE ENCOUNTER
----- Message from Shona Hassan sent at 7/9/2019  1:05 PM CDT -----  Contact: [facility]  Ms Steinberg states they need an order for hospice care- fax [phone] or call her back at [phone], states this is urgent.

## 2019-07-11 ENCOUNTER — EXTERNAL HOME HEALTH (OUTPATIENT)
Dept: HOME HEALTH SERVICES | Facility: HOSPITAL | Age: 84
End: 2019-07-11
Payer: MEDICARE

## 2019-07-16 ENCOUNTER — EXTERNAL HOME HEALTH (OUTPATIENT)
Dept: HOME HEALTH SERVICES | Facility: HOSPITAL | Age: 84
End: 2019-07-16
Payer: MEDICARE

## 2019-07-23 ENCOUNTER — PATIENT MESSAGE (OUTPATIENT)
Dept: INTERNAL MEDICINE | Facility: CLINIC | Age: 84
End: 2019-07-23

## 2019-08-30 ENCOUNTER — PATIENT MESSAGE (OUTPATIENT)
Dept: INTERNAL MEDICINE | Facility: CLINIC | Age: 84
End: 2019-08-30

## 2019-09-27 ENCOUNTER — PATIENT OUTREACH (OUTPATIENT)
Dept: ADMINISTRATIVE | Facility: HOSPITAL | Age: 84
End: 2019-09-27

## 2019-09-27 ENCOUNTER — PATIENT MESSAGE (OUTPATIENT)
Dept: INTERNAL MEDICINE | Facility: CLINIC | Age: 84
End: 2019-09-27

## 2019-09-27 NOTE — PROGRESS NOTES
Per pt daughter, Skyler Kwon, pt passed away. Chart updated and death notification sent to [employer]

## 2023-05-24 NOTE — LETTER
February 20, 2019      Roscoe Boyd MD  [address] [place] [address] [place] [address]           [facility]  [place] [address]  Phone: [phone]  Fax: [phone]          Patient: Coy López   MR Number: [id number]   YOB: 1924   Date of Visit: 2/19/2019       Dear Dr. Roscoe Boyd:    Thank you for referring Coy López to me for evaluation. Attached you will find relevant portions of my assessment and plan of care.    If you have questions, please do not hesitate to call me. I look forward to following Coy López along with you.    Sincerely,    Pito Renner MD    Enclosure  CC:  No Recipients    If you would like to receive this communication electronically, please contact [email or web address] or [phone] to request more information on [facility] access.    For providers and/or their staff who would like to refer a patient to [facility], please contact us through our one-stop-shop provider referral line, [facility], at [phone].    If you feel you have received this communication in error or would no longer like to receive these types of communications, please e-mail [email or web address]         
Detail Level: Detailed